# Patient Record
Sex: FEMALE | Race: BLACK OR AFRICAN AMERICAN | NOT HISPANIC OR LATINO | Employment: OTHER | URBAN - METROPOLITAN AREA
[De-identification: names, ages, dates, MRNs, and addresses within clinical notes are randomized per-mention and may not be internally consistent; named-entity substitution may affect disease eponyms.]

---

## 2017-04-25 ENCOUNTER — HOSPITAL ENCOUNTER (EMERGENCY)
Facility: HOSPITAL | Age: 57
Discharge: HOME | End: 2017-04-25
Attending: EMERGENCY MEDICINE | Admitting: EMERGENCY MEDICINE
Payer: MEDICARE

## 2017-04-25 VITALS
SYSTOLIC BLOOD PRESSURE: 143 MMHG | BODY MASS INDEX: 25.61 KG/M2 | HEART RATE: 95 BPM | RESPIRATION RATE: 16 BRPM | OXYGEN SATURATION: 99 % | TEMPERATURE: 98 F | DIASTOLIC BLOOD PRESSURE: 85 MMHG | WEIGHT: 140 LBS

## 2017-04-25 DIAGNOSIS — J44.1 COPD EXACERBATION: ICD-10-CM

## 2017-04-25 DIAGNOSIS — R06.02 SHORTNESS OF BREATH: ICD-10-CM

## 2017-04-25 DIAGNOSIS — J06.9 ACUTE URI: Primary | ICD-10-CM

## 2017-04-25 DIAGNOSIS — R07.9 CHEST PAIN: ICD-10-CM

## 2017-04-25 DIAGNOSIS — R53.1 WEAKNESS: ICD-10-CM

## 2017-04-25 LAB
ALBUMIN SERPL BCP-MCNC: 4.2 G/DL
ALP SERPL-CCNC: 113 U/L
ALT SERPL W/O P-5'-P-CCNC: 23 U/L
ANION GAP SERPL CALC-SCNC: 10 MMOL/L
AST SERPL-CCNC: 19 U/L
BASOPHILS # BLD AUTO: 0.02 K/UL
BASOPHILS NFR BLD: 0.3 %
BILIRUB SERPL-MCNC: 0.4 MG/DL
BILIRUB UR QL STRIP: NEGATIVE
BNP SERPL-MCNC: <10 PG/ML
BUN SERPL-MCNC: 17 MG/DL
CALCIUM SERPL-MCNC: 9.8 MG/DL
CHLORIDE SERPL-SCNC: 105 MMOL/L
CLARITY UR REFRACT.AUTO: CLEAR
CO2 SERPL-SCNC: 28 MMOL/L
COLOR UR AUTO: COLORLESS
CREAT SERPL-MCNC: 0.9 MG/DL
DIFFERENTIAL METHOD: ABNORMAL
EOSINOPHIL # BLD AUTO: 0.1 K/UL
EOSINOPHIL NFR BLD: 0.6 %
ERYTHROCYTE [DISTWIDTH] IN BLOOD BY AUTOMATED COUNT: 13 %
EST. GFR  (AFRICAN AMERICAN): >60 ML/MIN/1.73 M^2
EST. GFR  (NON AFRICAN AMERICAN): >60 ML/MIN/1.73 M^2
GLUCOSE SERPL-MCNC: 98 MG/DL
GLUCOSE UR QL STRIP: NEGATIVE
HCT VFR BLD AUTO: 42.2 %
HGB BLD-MCNC: 14.8 G/DL
HGB UR QL STRIP: NEGATIVE
KETONES UR QL STRIP: NEGATIVE
LEUKOCYTE ESTERASE UR QL STRIP: NEGATIVE
LYMPHOCYTES # BLD AUTO: 1.2 K/UL
LYMPHOCYTES NFR BLD: 15.2 %
MCH RBC QN AUTO: 29 PG
MCHC RBC AUTO-ENTMCNC: 35.1 %
MCV RBC AUTO: 83 FL
MONOCYTES # BLD AUTO: 0.4 K/UL
MONOCYTES NFR BLD: 4.8 %
NEUTROPHILS # BLD AUTO: 6.1 K/UL
NEUTROPHILS NFR BLD: 79 %
NITRITE UR QL STRIP: NEGATIVE
PH UR STRIP: 8 [PH] (ref 5–8)
PLATELET # BLD AUTO: 260 K/UL
PMV BLD AUTO: 9 FL
POTASSIUM SERPL-SCNC: 3.4 MMOL/L
PROT SERPL-MCNC: 8.1 G/DL
PROT UR QL STRIP: NEGATIVE
RBC # BLD AUTO: 5.1 M/UL
SODIUM SERPL-SCNC: 143 MMOL/L
SP GR UR STRIP: 1 (ref 1–1.03)
TROPONIN I SERPL DL<=0.01 NG/ML-MCNC: <0.006 NG/ML
URN SPEC COLLECT METH UR: ABNORMAL
UROBILINOGEN UR STRIP-ACNC: NEGATIVE EU/DL
WBC # BLD AUTO: 7.7 K/UL

## 2017-04-25 PROCEDURE — 25000003 PHARM REV CODE 250: Performed by: EMERGENCY MEDICINE

## 2017-04-25 PROCEDURE — 96360 HYDRATION IV INFUSION INIT: CPT

## 2017-04-25 PROCEDURE — 94640 AIRWAY INHALATION TREATMENT: CPT

## 2017-04-25 PROCEDURE — 83880 ASSAY OF NATRIURETIC PEPTIDE: CPT

## 2017-04-25 PROCEDURE — 63600175 PHARM REV CODE 636 W HCPCS: Performed by: EMERGENCY MEDICINE

## 2017-04-25 PROCEDURE — 85025 COMPLETE CBC W/AUTO DIFF WBC: CPT

## 2017-04-25 PROCEDURE — 84484 ASSAY OF TROPONIN QUANT: CPT

## 2017-04-25 PROCEDURE — 93005 ELECTROCARDIOGRAM TRACING: CPT

## 2017-04-25 PROCEDURE — 80053 COMPREHEN METABOLIC PANEL: CPT

## 2017-04-25 PROCEDURE — 81003 URINALYSIS AUTO W/O SCOPE: CPT

## 2017-04-25 PROCEDURE — 25000242 PHARM REV CODE 250 ALT 637 W/ HCPCS: Performed by: EMERGENCY MEDICINE

## 2017-04-25 PROCEDURE — 99284 EMERGENCY DEPT VISIT MOD MDM: CPT | Mod: ,,, | Performed by: EMERGENCY MEDICINE

## 2017-04-25 PROCEDURE — 99284 EMERGENCY DEPT VISIT MOD MDM: CPT | Mod: 25

## 2017-04-25 PROCEDURE — 93010 ELECTROCARDIOGRAM REPORT: CPT | Mod: ,,, | Performed by: INTERNAL MEDICINE

## 2017-04-25 RX ORDER — PREDNISONE 20 MG/1
60 TABLET ORAL
Status: COMPLETED | OUTPATIENT
Start: 2017-04-25 | End: 2017-04-25

## 2017-04-25 RX ORDER — PREDNISONE 20 MG/1
40 TABLET ORAL DAILY
Qty: 10 TABLET | Refills: 0 | Status: SHIPPED | OUTPATIENT
Start: 2017-04-25 | End: 2017-04-30

## 2017-04-25 RX ORDER — TOLTERODINE 4 MG/1
4 CAPSULE, EXTENDED RELEASE ORAL DAILY
COMMUNITY
End: 2018-01-31 | Stop reason: ALTCHOICE

## 2017-04-25 RX ORDER — IPRATROPIUM BROMIDE AND ALBUTEROL SULFATE 2.5; .5 MG/3ML; MG/3ML
3 SOLUTION RESPIRATORY (INHALATION)
Status: COMPLETED | OUTPATIENT
Start: 2017-04-25 | End: 2017-04-25

## 2017-04-25 RX ADMIN — PREDNISONE 60 MG: 20 TABLET ORAL at 01:04

## 2017-04-25 RX ADMIN — IPRATROPIUM BROMIDE AND ALBUTEROL SULFATE 3 ML: .5; 3 SOLUTION RESPIRATORY (INHALATION) at 01:04

## 2017-04-25 RX ADMIN — SODIUM CHLORIDE 1000 ML: 0.9 INJECTION, SOLUTION INTRAVENOUS at 01:04

## 2017-04-25 NOTE — DISCHARGE INSTRUCTIONS
COPD Flare    You have had a flare-up of your COPD.  COPD, or chronic obstructive pulmonary disease, is a common lung disease. It causes your airways to become irritated and narrower. This makes it harder for you to breathe. Emphysema and chronic bronchitis are both types of COPD. This is a chronic condition, which means you always have it. Sometimes it gets worse. When this happens, it is called a flare-up.  Symptoms of COPD  People with COPD may have symptoms most of the time. In a flare-up, your symptoms get worse. These symptoms may mean you are having a flare-up:  · Shortness of breath, shallow or rapid breathing, or wheezing that gets worse  · Lung infection  · Cough that gets worse  · More mucus, thicker mucus or mucus of a different color  · Tiredness, decreased energy, or trouble doing your usual activities  · Fever  · Chest tightness  · Your symptoms dont get better even when you use your usual medicines, inhalers, and nebulizer  · Trouble talking  · You feel confused  Causes of flare-ups  Unfortunately, a flare-up can happen even though you did everything right, and you followed your doctors instructions. Some causes of flare-ups are:  · Smoking or secondhand smoke  · Colds, the flu, or respiratory infections  · Air pollution  · Sudden change in the weather  · Dust, irritating chemicals, or strong fumes  · Not taking your medicines as prescribed  Home care  Here are some things you can do at home to treat a flare-up:  · Try not to panic. This makes it harder to breathe, and keeps you from doing the right things.  · Dont smoke or be around others who are smoking.  · Try to drink more fluids than usual during a flare-up, unless your doctor has told you not to because of heart and kidney problems. More fluids can help loosen the mucus.  · Use your inhalers and nebulizer, if you have one, as you have been told to.  · If you were given antibiotics, take them until they are used up or your doctor tells  you to stop. Its important to finish the antibiotics, even though you feel better. This will make sure the infection has cleared.  · If you were given prednisone or another steroid, finish it even if you feel better.  Preventing a flare-up  Even though flare-ups happen, the best way to treat one is to prevent it before it starts. Here are some pointers:  · Dont smoke or be around others who are smoking.  · Take your medicines as you have been told.  · Talk with your doctor about getting a flu shot every year. Also find out if you need a pneumonia shot.  · If there is a weather advisory warning to stay indoors, try to stay inside when possible.  · Try to eat healthy and get plenty of sleep.  · Try to avoid things that usually set you off, like dust, chemical fumes, hairsprays, or strong perfumes.  Follow-up care  Follow up with your healthcare provider, or as advised.  If a culture was done, you will be told if your treatment needs to be changed. You can call as directed for the results.  If X-rays were done, you will be notified of any new findings that may affect your care.  Call 911  Call 911 if any of these occur:  · You have trouble breathing  · You feel confused or its difficult to wake you up  · You faint or lose consciousness  · You have a rapid heart rate  · You have new pain in your chest, arm, shoulder, neck or upper back  When to seek medical advice  Call your healthcare provider right away if any of these occur:  · Wheezing or shortness of breath gets worse  · You need to use your inhalers more often than usual without relief  · Fever of 100.4°F (38ºC) or higher, or as directed by your healthcare provider  · Coughing up lots of dark-colored or bloody mucus (sputum)  · Chest pain with each breath  · You do not start to get better within 24 hours  · Swelling of your ankles gets worse  · Dizziness or weakness  Date Last Reviewed: 9/1/2016  © 2310-6605 [employer]. [address] [facility],  [place] [address]. All rights reserved. This information is not intended as a substitute for professional medical care. Always follow your healthcare professional's instructions.          Viral Upper Respiratory Illness (Adult)  You have a viral upper respiratory illness (URI), which is another term for the common cold. This illness is contagious during the first few days. It is spread through the air by coughing and sneezing. It may also be spread by direct contact (touching the sick person and then touching your own eyes, nose, or mouth). Frequent handwashing will decrease risk of spread. Most viral illnesses go away within 7 to 10 days with rest and simple home remedies. Sometimes the illness may last for several weeks. Antibiotics will not kill a virus, and they are generally not prescribed for this condition.    Home care  · If symptoms are severe, rest at home for the first 2 to 3 days. When you resume activity, don't let yourself get too tired.  · Avoid being exposed to cigarette smoke (yours or others).  · You may use acetaminophen or ibuprofen to control pain and fever, unless another medicine was prescribed. (Note: If you have chronic liver or kidney disease, have ever had a stomach ulcer or gastrointestinal bleeding, or are taking blood-thinning medicines, talk with your healthcare provider before using these medicines.) Aspirin should never be given to anyone under 18 years of age who is ill with a viral infection or fever. It may cause severe liver or brain damage.  · Your appetite may be poor, so a light diet is fine. Avoid dehydration by drinking 6 to 8 glasses of fluids per day (water, soft drinks, juices, tea, or soup). Extra fluids will help loosen secretions in the nose and lungs.  · Over-the-counter cold medicines will not shorten the length of time youre sick, but they may be helpful for the following symptoms: cough, sore throat, and nasal and sinus congestion. (Note: Do not use decongestants  if you have high blood pressure.)  Follow-up care  Follow up with your healthcare provider, or as advised.  When to seek medical advice  Call your healthcare provider right away if any of these occur:  · Cough with lots of colored sputum (mucus)  · Severe headache; face, neck, or ear pain  · Difficulty swallowing due to throat pain  · Fever of 100.4°F (38°C)  Call 911, or get immediate medical care  Call emergency services right away if any of these occur:  · Chest pain, shortness of breath, wheezing, or difficulty breathing  · Coughing up blood  · Inability to swallow due to throat pain  Date Last Reviewed: 9/13/2015 © 2000-2016 [employer]. [address] [place] [address], [place] [address]. All rights reserved. This information is not intended as a substitute for professional medical care. Always follow your healthcare professional's instructions.        Noncardiac Chest Pain    Based on your visit today, the health care provider doesnt know what is causing your chest pain. In most cases, people who come to the emergency department with chest pain dont have a problem with their heart. Instead, the pain is caused by other conditions. These may be problems with the lungs, muscles, bones, digestive tract, nerves, or mental health.  Lung problems  · Inflammation around the lungs (pleurisy)  · Collapsed lung (pneumothorax)  · Fluid around the lungs (pleural effusion)  · Lung cancer. This is a rare cause of chest pain.  Muscle or bone problems  · Inflamed cartilage between the ribs (pleurisy)  · Fibromyalgia  · Rheumatoid arthritis  Digestive system problems  · Reflux  · Stomach ulcer  · Spasms of the esophagus  · Gall stones  · Gallbladder inflammation  Mental health conditions  · Panic or anxiety attacks  · Emotional distress  Your condition doesnt seem serious and your pain doesnt appear to be coming from your heart. But sometimes the signs of a serious problem take more time to appear. Watch for the  warning signs listed below.  Home care  Follow these guidelines when caring for yourself at home:  · Rest today and avoid strenuous activity.  · Take any prescribed medicine as directed.  Follow-up care  Follow up with your health care provider, or as advised, if you dont start to feel better within 24 hours.  When to seek medical advice  Call your health care provider right away if any of these occur:  · A change in the type of pain. Call if it feels different, becomes more serious, lasts longer, or begins to spread into your shoulder, arm, neck, jaw, or back.  · Shortness of breath  · You feel more pain when you breathe  · Cough with dark-colored mucus or blood  · Weakness, dizziness, or fainting  · Fever of 100.4ºF (38ºC) or higher, or as directed by your health care provider  · Swelling, pain, or redness in one leg  Date Last Reviewed: 11/24/2014  © 7044-0341 [employer]. [address] [place] [address], [place] [address]. All rights reserved. This information is not intended as a substitute for professional medical care. Always follow your healthcare professional's instructions.

## 2017-04-25 NOTE — ED NOTES
GENERAL: The patient is a well-developed, well-nourished female in no apparent distress; alert and oriented x3.    HEENT: Head is normocephalic and atraumatic. Extraocular muscles are intact. Pupils are equal, round, and reactive to light and accommodation. Nares appeared normal. Mouth is well hydrated and without lesions. Mucous membranes are moist. Posterior pharynx clear of any exudate or lesions.    NECK: Supple. No carotid bruits. No lymphadenopathy or thyromegaly.    LUNGS: Clear to auscultation.    HEART: Regular rate and rhythm without murmur.     ABDOMEN: Soft, nontender, and nondistended. Positive bowel sounds. No hepatosplenomegaly was noted.     EXTREMITIES: Without any cyanosis, clubbing, rash, lesions or edema.     NEUROLOGIC: Cranial nerves II through XII are grossly intact.     PSYCHIATRIC: Denies suicidal or homicidal ideations.    SKIN: No ulceration or induration present.

## 2017-04-25 NOTE — PROVIDER PROGRESS NOTES - EMERGENCY DEPT.
Encounter Date: 4/25/2017    ED Physician Progress Notes       SCRIBE NOTE: I, Fabricio Mayorga, am scribing for, and in the presence of,  Dilcia Cancino MD.  Physician Statement: IDilcia MD, personally performed the services described in this documentation as scribed by Fabricio Mayorga in my presence, and it is both accurate and complete.      EKG - STEMI Decision  Initial Reading: No STEMI present.

## 2017-04-25 NOTE — ED PROVIDER NOTES
"Encounter Date: 4/25/2017    SCRIBE #1 NOTE: I, Whit Kirby, am scribing for, and in the presence of,  Dr. Mcclellan. I have scribed the entire note.       History     Chief Complaint   Patient presents with    Shortness of Breath     denies chest apin; states "I feel like I have pneumonia"     Review of patient's allergies indicates:  No Known Allergies  HPI Comments: Time seen by provider: 1:06 PM    This is a 56 y.o. female with PM hx of anxiety, COPD, and HTN who presents with complaint of chest discomfort for the past 6 days. No current CP. Pt also endorses SOB, cough productive of clear sputum, and generalized weakness for the same duration. She took Z-pack with minimal improvement of sx. She denies fever, chills, or any other symptoms at this time.     Pt reports severe itching on shin of RLE. No bruising to that area. She also recently swallowed a gold tooth.     The history is provided by the patient and medical records.     Past Medical History:   Diagnosis Date    Anxiety     Arthritis     COPD (chronic obstructive pulmonary disease)     Depression     GERD (gastroesophageal reflux disease)     Hypertension      History reviewed. No pertinent surgical history.  Family History   Problem Relation Age of Onset    Cancer Mother     Cancer Brother     Diabetes Maternal Uncle      Social History   Substance Use Topics    Smoking status: Former Smoker     Quit date: 12/29/1995    Smokeless tobacco: None    Alcohol use 1.2 oz/week     2 Cans of beer per week      Comment: daily     Review of Systems   Constitutional: Negative for chills and fever.   HENT: Negative for congestion.    Eyes: Negative for visual disturbance.   Respiratory: Positive for cough (productive of clear sputum) and shortness of breath.    Cardiovascular: Positive for chest pain (for the past 8 days though none currently).   Gastrointestinal: Negative for abdominal pain, nausea and vomiting.   Genitourinary: Negative for dysuria. "   Musculoskeletal: Negative for back pain.   Skin: Negative for rash.        (+) Itching RLE, described as severe   Neurological: Positive for weakness. Negative for headaches.       Physical Exam   Initial Vitals   BP Pulse Resp Temp SpO2   04/25/17 1201 04/25/17 1201 04/25/17 1201 04/25/17 1201 04/25/17 1201   143/85 94 16 98.1 °F (36.7 °C) 100 %     Physical Exam    Nursing note and vitals reviewed.  Constitutional: She appears well-developed and well-nourished. She is not diaphoretic. No distress.   HENT:   Head: Normocephalic and atraumatic.   Eyes: EOM are normal. Pupils are equal, round, and reactive to light.   Neck: Normal range of motion. Neck supple.   Cardiovascular: Normal rate and regular rhythm. Exam reveals no gallop and no friction rub.    No murmur heard.  Pulmonary/Chest: No respiratory distress. She has no wheezes. She has no rhonchi. She has rales.   Bibasilar crackles   Abdominal: Soft. She exhibits no distension. There is no tenderness. There is no rebound and no guarding.   Musculoskeletal: Normal range of motion. She exhibits no edema (No BLE edema) or tenderness.   Neurological: She is alert and oriented to person, place, and time. She has normal strength. No cranial nerve deficit or sensory deficit.   Skin: Skin is warm and dry. No rash noted. No erythema.   Psychiatric: She has a normal mood and affect. Her behavior is normal. Judgment and thought content normal.         ED Course   Procedures  Labs Reviewed   CBC W/ AUTO DIFFERENTIAL - Abnormal; Notable for the following:        Result Value    MPV 9.0 (*)     Gran% 79.0 (*)     Lymph% 15.2 (*)     All other components within normal limits   COMPREHENSIVE METABOLIC PANEL - Abnormal; Notable for the following:     Potassium 3.4 (*)     All other components within normal limits   URINALYSIS - Abnormal; Notable for the following:     Color, UA Colorless (*)     All other components within normal limits   TROPONIN I   B-TYPE NATRIURETIC  PEPTIDE     EKG Readings: (Independently Interpreted)   NSR. Possible Q waves anteriorly. No ST elevation or depression. No change from prior.        X-Rays:   Independently Interpreted Readings:   Chest X-Ray: No acute process.      Medical Decision Making:   History:   Old Medical Records: I decided to obtain old medical records.  Initial Assessment:   Pt with cold-like symptoms for the last 6 days but also having slight CP and SOB which does not sound cardiac in nature. Will perform cardiac work up and if negative will likely discharge home.   Differential Diagnosis:   PNA, bronchitis, COPD exacerbation, and ACS.  Independently Interpreted Test(s):   I have ordered and independently interpreted X-rays - see prior notes.  I have ordered and independently interpreted EKG Reading(s) - see prior notes  Clinical Tests:   Lab Tests: Ordered and Reviewed  Radiological Study: Ordered  Medical Tests: Ordered  ED Management:  2:36 PM  Pt's work up is completely unremarkable, including cardiac enzymes. Unclear etiology for generalized weakness and mild SOB except for COPD exacerbation and viral syndrome. Will discharge home with short course of steroids and close follow up.             Scribe Attestation:   Scribe #1: I performed the above scribed service and the documentation accurately describes the services I performed. I attest to the accuracy of the note.    Attending Attestation:           Physician Attestation for Scribe:  Physician Attestation Statement for Scribe #1: I, Dr. Mcclellan, reviewed documentation, as scribed by Whit Kirby in my presence, and it is both accurate and complete.                 ED Course     Clinical Impression:   The primary encounter diagnosis was Acute URI. Diagnoses of Shortness of breath, Chest pain, Weakness, and COPD exacerbation were also pertinent to this visit.    Disposition:   Disposition: Discharged  Condition: Stable       Ezio Mcclellan III, MD  04/25/17 4719

## 2017-04-25 NOTE — ED AVS SNAPSHOT
[facility]  [address]  [facility] [address]               Wen BAÑUELOS Vishnu   2017 12:52 PM   ED    Description:  Female : 1960   Department:  [facility]           Your Care was Coordinated By:     Provider Role From To    Ezio Mcclellan III, MD Attending Provider 17 1304 --      Reason for Visit     Shortness of Breath           Diagnoses this Visit        Comments    Acute URI    -  Primary     Shortness of breath         Chest pain         Weakness         COPD exacerbation           ED Disposition     None           To Do List           Follow-up Information     Follow up with MERRITT Solo. Schedule an appointment as soon as possible for a visit in 3 days.    Specialty:  Family Medicine    Contact information:    [address] [place] [address]  [phone]         These Medications        Disp Refills Start End    predniSONE (DELTASONE) 20 MG tablet 10 tablet 0 2017    Take 2 tablets (40 mg total) by mouth once daily. - Oral      [facility]     [facility] Care Line -  Assistance  Unless otherwise directed by your provider, please contact [facility], our nurse care line that is available for  assistance.     Registered nurses in the [facility] provide: appointment scheduling, clinical advisement, health education, and other advisory services.  Call: [phone] (toll free)               Medications           Message regarding Medications     Verify the changes and/or additions to your medication regime listed below are the same as discussed with your clinician today.  If any of these changes or additions are incorrect, please notify your healthcare provider.        START taking these NEW medications        Refills    predniSONE (DELTASONE) 20 MG tablet 0    Sig: Take 2 tablets (40 mg total) by mouth once daily.    Class: Print    Route: Oral       These medications were administered today        Dose Freq    sodium chloride 0.9% bolus 1,000 mL 1,000 mL ED 1 Time    Sig: Inject 1,000 mLs into the vein ED 1 Time.    Class: Normal    Route: Intravenous    albuterol-ipratropium 2.5mg-0.5mg/3mL nebulizer solution 3 mL 3 mL ED 1 Time    Sig: Take 3 mLs by nebulization ED 1 Time.    Class: Normal    Route: Nebulization    predniSONE tablet 60 mg 60 mg ED 1 Time    Sig: Take 3 tablets (60 mg total) by mouth ED 1 Time.    Class: Normal    Route: Oral           Verify that the below list of medications is an accurate representation of the medications you are currently taking.  If none reported, the list may be blank. If incorrect, please contact your healthcare provider. Carry this list with you in case of emergency.           Current Medications     acyclovir (ZOVIRAX) 400 MG tablet Take by mouth 2 (two) times daily.    cyproheptadine (PERIACTIN) 4 mg tablet Take 4 mg by mouth 3 (three) times daily as needed.    ferrous sulfate 325 (65 FE) MG EC tablet Take 325 mg by mouth 3 (three) times daily with meals.      fluconazole (DIFLUCAN) 150 MG Tab Take 150 mg by mouth once daily.    hydrochlorothiazide (HYDRODIURIL) 25 MG tablet Take 25 mg by mouth once daily.    omeprazole (PRILOSEC) 40 MG capsule Take 40 mg by mouth once daily.    tolterodine (DETROL LA) 4 MG 24 hr capsule Take 4 mg by mouth once daily.    aspirin (ECOTRIN) 81 MG EC tablet Take 81 mg by mouth once daily.    docusate sodium (COLACE) 100 MG capsule Take 1 capsule (100 mg total) by mouth 3 (three) times daily as needed for Constipation.    escitalopram oxalate (LEXAPRO) 10 MG tablet Take 10 mg by mouth once daily.    ferrous sulfate 325 (65 FE) MG EC tablet Take 325 mg by mouth 3 (three) times daily with meals.    lactulose (CHRONULAC) 10 gram/15 mL solution Take by mouth 3 (three) times daily.    lorazepam (ATIVAN) 1 MG tablet Take 1 mg by mouth every 6 (six) hours as needed for Anxiety.     metronidazole (FLAGYL) 500 MG tablet Take 500 mg by mouth 3 (three) times daily.    potassium chloride (KLOR-CON) 20 mEq Pack Take 20 mEq by mouth 4 (four) times daily.    predniSONE (DELTASONE) 20 MG tablet Take 2 tablets (40 mg total) by mouth once daily.    sucralfate (CARAFATE) 100 mg/mL suspension Take 10 mLs (1 g total) by mouth 4 (four) times daily. Take only for 3 days, can cause constipation           Clinical Reference Information           Your Vitals Were     BP Pulse Temp Resp Weight SpO2    143/85 (BP Location: Right arm, Patient Position: Sitting) 95 98.1 °F (36.7 °C) (Oral) 16 63.5 kg (140 lb) 99%    BMI                25.61 kg/m2          Allergies as of 4/25/2017     No Known Allergies      Immunizations Administered on Date of Encounter - 4/25/2017     None      ED Micro, Lab, POCT     Start Ordered       Status Ordering Provider    04/25/17 1351 04/25/17 1350  Urinalysis  STAT      Final result     04/25/17 1313 04/25/17 1312  CBC auto differential  STAT      Final result     04/25/17 1313 04/25/17 1312  Comprehensive metabolic panel  STAT      Final result     04/25/17 1313 04/25/17 1312  Troponin I  STAT      Final result     04/25/17 1313 04/25/17 1312  Brain natriuretic peptide  STAT      Final result     04/25/17 1313 04/25/17 1312    STAT,   Status:  Canceled      Canceled       ED Imaging Orders     Start Ordered       Status Ordering Provider    04/25/17 1313 04/25/17 1312  X-Ray Chest PA And Lateral  1 time imaging      Final result         Discharge Instructions           COPD Flare    You have had a flare-up of your COPD.  COPD, or chronic obstructive pulmonary disease, is a common lung disease. It causes your airways to become irritated and narrower. This makes it harder for you to breathe. Emphysema and chronic bronchitis are both types of COPD. This is a chronic condition, which means you always have it. Sometimes it gets worse. When this happens, it is called a flare-up.  Symptoms  of COPD  People with COPD may have symptoms most of the time. In a flare-up, your symptoms get worse. These symptoms may mean you are having a flare-up:  · Shortness of breath, shallow or rapid breathing, or wheezing that gets worse  · Lung infection  · Cough that gets worse  · More mucus, thicker mucus or mucus of a different color  · Tiredness, decreased energy, or trouble doing your usual activities  · Fever  · Chest tightness  · Your symptoms dont get better even when you use your usual medicines, inhalers, and nebulizer  · Trouble talking  · You feel confused  Causes of flare-ups  Unfortunately, a flare-up can happen even though you did everything right, and you followed your doctors instructions. Some causes of flare-ups are:  · Smoking or secondhand smoke  · Colds, the flu, or respiratory infections  · Air pollution  · Sudden change in the weather  · Dust, irritating chemicals, or strong fumes  · Not taking your medicines as prescribed  Home care  Here are some things you can do at home to treat a flare-up:  · Try not to panic. This makes it harder to breathe, and keeps you from doing the right things.  · Dont smoke or be around others who are smoking.  · Try to drink more fluids than usual during a flare-up, unless your doctor has told you not to because of heart and kidney problems. More fluids can help loosen the mucus.  · Use your inhalers and nebulizer, if you have one, as you have been told to.  · If you were given antibiotics, take them until they are used up or your doctor tells you to stop. Its important to finish the antibiotics, even though you feel better. This will make sure the infection has cleared.  · If you were given prednisone or another steroid, finish it even if you feel better.  Preventing a flare-up  Even though flare-ups happen, the best way to treat one is to prevent it before it starts. Here are some pointers:  · Dont smoke or be around others who are smoking.  · Take your  medicines as you have been told.  · Talk with your doctor about getting a flu shot every year. Also find out if you need a pneumonia shot.  · If there is a weather advisory warning to stay indoors, try to stay inside when possible.  · Try to eat healthy and get plenty of sleep.  · Try to avoid things that usually set you off, like dust, chemical fumes, hairsprays, or strong perfumes.  Follow-up care  Follow up with your healthcare provider, or as advised.  If a culture was done, you will be told if your treatment needs to be changed. You can call as directed for the results.  If X-rays were done, you will be notified of any new findings that may affect your care.  Call 911  Call 911 if any of these occur:  · You have trouble breathing  · You feel confused or its difficult to wake you up  · You faint or lose consciousness  · You have a rapid heart rate  · You have new pain in your chest, arm, shoulder, neck or upper back  When to seek medical advice  Call your healthcare provider right away if any of these occur:  · Wheezing or shortness of breath gets worse  · You need to use your inhalers more often than usual without relief  · Fever of 100.4°F (38ºC) or higher, or as directed by your healthcare provider  · Coughing up lots of dark-colored or bloody mucus (sputum)  · Chest pain with each breath  · You do not start to get better within 24 hours  · Swelling of your ankles gets worse  · Dizziness or weakness  Date Last Reviewed: 9/1/2016  © 9223-4865 [employer]. [address] [place] [address], [place] [address]. All rights reserved. This information is not intended as a substitute for professional medical care. Always follow your healthcare professional's instructions.          Viral Upper Respiratory Illness (Adult)  You have a viral upper respiratory illness (URI), which is another term for the common cold. This illness is contagious during the first few days. It is spread through the air by coughing and  sneezing. It may also be spread by direct contact (touching the sick person and then touching your own eyes, nose, or mouth). Frequent handwashing will decrease risk of spread. Most viral illnesses go away within 7 to 10 days with rest and simple home remedies. Sometimes the illness may last for several weeks. Antibiotics will not kill a virus, and they are generally not prescribed for this condition.    Home care  · If symptoms are severe, rest at home for the first 2 to 3 days. When you resume activity, don't let yourself get too tired.  · Avoid being exposed to cigarette smoke (yours or others).  · You may use acetaminophen or ibuprofen to control pain and fever, unless another medicine was prescribed. (Note: If you have chronic liver or kidney disease, have ever had a stomach ulcer or gastrointestinal bleeding, or are taking blood-thinning medicines, talk with your healthcare provider before using these medicines.) Aspirin should never be given to anyone under 18 years of age who is ill with a viral infection or fever. It may cause severe liver or brain damage.  · Your appetite may be poor, so a light diet is fine. Avoid dehydration by drinking 6 to 8 glasses of fluids per day (water, soft drinks, juices, tea, or soup). Extra fluids will help loosen secretions in the nose and lungs.  · Over-the-counter cold medicines will not shorten the length of time youre sick, but they may be helpful for the following symptoms: cough, sore throat, and nasal and sinus congestion. (Note: Do not use decongestants if you have high blood pressure.)  Follow-up care  Follow up with your healthcare provider, or as advised.  When to seek medical advice  Call your healthcare provider right away if any of these occur:  · Cough with lots of colored sputum (mucus)  · Severe headache; face, neck, or ear pain  · Difficulty swallowing due to throat pain  · Fever of 100.4°F (38°C)  Call 911, or get immediate medical care  Call emergency  services right away if any of these occur:  · Chest pain, shortness of breath, wheezing, or difficulty breathing  · Coughing up blood  · Inability to swallow due to throat pain  Date Last Reviewed: 9/13/2015 © 2000-2016 [employer]. [address] [place] [address], [place] [address]. All rights reserved. This information is not intended as a substitute for professional medical care. Always follow your healthcare professional's instructions.        Noncardiac Chest Pain    Based on your visit today, the health care provider doesnt know what is causing your chest pain. In most cases, people who come to the emergency department with chest pain dont have a problem with their heart. Instead, the pain is caused by other conditions. These may be problems with the lungs, muscles, bones, digestive tract, nerves, or mental health.  Lung problems  · Inflammation around the lungs (pleurisy)  · Collapsed lung (pneumothorax)  · Fluid around the lungs (pleural effusion)  · Lung cancer. This is a rare cause of chest pain.  Muscle or bone problems  · Inflamed cartilage between the ribs (pleurisy)  · Fibromyalgia  · Rheumatoid arthritis  Digestive system problems  · Reflux  · Stomach ulcer  · Spasms of the esophagus  · Gall stones  · Gallbladder inflammation  Mental health conditions  · Panic or anxiety attacks  · Emotional distress  Your condition doesnt seem serious and your pain doesnt appear to be coming from your heart. But sometimes the signs of a serious problem take more time to appear. Watch for the warning signs listed below.  Home care  Follow these guidelines when caring for yourself at home:  · Rest today and avoid strenuous activity.  · Take any prescribed medicine as directed.  Follow-up care  Follow up with your health care provider, or as advised, if you dont start to feel better within 24 hours.  When to seek medical advice  Call your health care provider right away if any of these occur:  · A change in  the type of pain. Call if it feels different, becomes more serious, lasts longer, or begins to spread into your shoulder, arm, neck, jaw, or back.  · Shortness of breath  · You feel more pain when you breathe  · Cough with dark-colored mucus or blood  · Weakness, dizziness, or fainting  · Fever of 100.4ºF (38ºC) or higher, or as directed by your health care provider  · Swelling, pain, or redness in one leg  Date Last Reviewed: 11/24/2014  © 1119-3850 [employer]. [address] [place] [address]. All rights reserved. This information is not intended as a substitute for professional medical care. Always follow your healthcare professional's instructions.          [facility] Sign-Up     Activating your [facility] account is as easy as 1-2-3!     1) Visit [email or web address], select Sign Up Now, enter this activation code and your date of birth, then select Next.  [email or web address]  Expires: 6/9/2017  2:37 PM      2) Create a username and password to use when you visit [facility] in the future and select a security question in case you lose your password and select Next.    3) Enter your e-mail address and click Sign Up!    Additional Information  If you have questions, please e-mail [email or web address] or call [phone] to talk to our [facility] staff. Remember, [facility] is NOT to be used for urgent needs. For medical emergencies, dial 911.         Smoking Cessation     If you would like to quit smoking:   You may be eligible for free services if you are a [place] resident and started smoking cigarettes before September 1, 1988.  Call the Smoking Cessation Trust (SCT) toll free at (206) 185-4397 or (441) 760-4109.   Call 1-800-QUIT-NOW if you do not meet the above criteria.   Contact us via email: [email or web address]   View our website for more information: [email or web address]         [facility] complies with applicable Federal civil rights laws and does not  discriminate on the basis of race, color, national origin, age, disability, or sex.        Language Assistance Services     ATTENTION: Language assistance services are available, free of charge. Please call [phone].      ATENCIÓN: Si christiano doan, tiene a walsh disposición servicios gratuitos de asistencia lingüística. Llame al [phone].     CHÚ Ý: N?u b?n nói Ti?ng Vi?t, có các d?ch v? h? tr? ngôn ng? mi?n phí dành cho b?n. G?i s? [phone].

## 2018-01-30 ENCOUNTER — OFFICE VISIT (OUTPATIENT)
Dept: UROLOGY | Facility: CLINIC | Age: 58
End: 2018-01-30
Payer: MEDICARE

## 2018-01-30 VITALS
DIASTOLIC BLOOD PRESSURE: 86 MMHG | SYSTOLIC BLOOD PRESSURE: 114 MMHG | HEART RATE: 86 BPM | WEIGHT: 145.5 LBS | BODY MASS INDEX: 26.61 KG/M2

## 2018-01-30 DIAGNOSIS — R35.1 NOCTURIA: ICD-10-CM

## 2018-01-30 DIAGNOSIS — N81.10 FEMALE CYSTOCELE: ICD-10-CM

## 2018-01-30 DIAGNOSIS — R33.9 INCOMPLETE BLADDER EMPTYING: ICD-10-CM

## 2018-01-30 DIAGNOSIS — R35.0 FREQUENCY OF URINATION: Primary | ICD-10-CM

## 2018-01-30 PROCEDURE — 99203 OFFICE O/P NEW LOW 30 MIN: CPT | Mod: 25,S$PBB,, | Performed by: PHYSICIAN ASSISTANT

## 2018-01-30 PROCEDURE — 99214 OFFICE O/P EST MOD 30 MIN: CPT | Mod: PBBFAC | Performed by: PHYSICIAN ASSISTANT

## 2018-01-30 PROCEDURE — 51701 INSERT BLADDER CATHETER: CPT | Mod: S$PBB,,, | Performed by: PHYSICIAN ASSISTANT

## 2018-01-30 PROCEDURE — 51701 INSERT BLADDER CATHETER: CPT | Mod: PBBFAC | Performed by: PHYSICIAN ASSISTANT

## 2018-01-30 PROCEDURE — 99999 PR PBB SHADOW E&M-EST. PATIENT-LVL IV: CPT | Mod: PBBFAC,,, | Performed by: PHYSICIAN ASSISTANT

## 2018-01-30 NOTE — LETTER
January 30, 2018      MERRITT Solo  [address]  [place] [address]           [facility] - Urology 4th Floor  [address]  [facility] [address]  Phone: [phone]          Patient: Wen Mares   MR Number: [id number]   YOB: 1960   Date of Visit: 1/30/2018       Dear Cristine Austin:    Thank you for referring Wen Mares to me for evaluation. Attached you will find relevant portions of my assessment and plan of care.    If you have questions, please do not hesitate to call me. I look forward to following Wen Mares along with you.    Sincerely,    Tootie Peters PA-C    Enclosure  CC:  No Recipients    If you would like to receive this communication electronically, please contact [email or web address] or [phone] to request more information on [employer] access.    For providers and/or their staff who would like to refer a patient to [facility], please contact us through our one-stop-shop provider referral line, [facility], at [phone].    If you feel you have received this communication in error or would no longer like to receive these types of communications, please e-mail [email or web address]

## 2018-01-30 NOTE — PROGRESS NOTES
CHIEF COMPLAINT:    Mrs. Mares is a 57 y.o. female presenting for urinary frequency.  PRESENTING ILLNESS:    Wen Mares is a 57 y.o. female who presents for urinary frequency since 2001.  Her frequency is worse when she is nervous (q 30 min).  She also reports nocturia.  She has urinary incontinence and wears a panty liner.  She changes it about twice a day.  She takes detrol for over a year.  It is not working.  She denies hematuria, dysuria, and recurrent UTI.    Her constipation is not managed by probiotics.    She takes HZTC.    REVIEW OF SYSTEMS:  Constitutional: Negative for fever and chills.   HENT: Negative for hearing loss.   Eyes: Negative for visual disturbance.   Respiratory: Positive for shortness of breath. Has COPD (uses inhaler)  Cardiovascular: Negative for chest pain.   Gastrointestinal: Positive for constipation Negative for nausea, vomiting.   Genitourinary:  See HPI  Neurological: Negative for dizziness.   Hematological: Does not bruise/bleed easily.   Psychiatric/Behavioral: Negative for confusion.     PATIENT HISTORY:    Past Medical History:   Diagnosis Date    Anxiety     Arthritis     COPD (chronic obstructive pulmonary disease)     Depression     GERD (gastroesophageal reflux disease)     Hypertension        History reviewed. No pertinent surgical history.    Family History   Problem Relation Age of Onset    Cancer Mother     Cancer Brother     Diabetes Maternal Uncle        Social History     Social History    Marital status: Single     Spouse name: N/A    Number of children: N/A    Years of education: N/A     Occupational History    Not on file.     Social History Main Topics    Smoking status: Former Smoker     Quit date: 12/29/1995    Smokeless tobacco: Not on file    Alcohol use 1.2 oz/week     2 Cans of beer per week      Comment: daily    Drug use: No    Sexual activity: Not on file     Other Topics Concern    Not on file     Social History Narrative     No narrative on file       Allergies:  Patient has no known allergies.    Medications:    Current Outpatient Prescriptions:     acyclovir (ZOVIRAX) 400 MG tablet, Take by mouth 2 (two) times daily., Disp: , Rfl:     aspirin (ECOTRIN) 81 MG EC tablet, Take 81 mg by mouth once daily., Disp: , Rfl:     cyproheptadine (PERIACTIN) 4 mg tablet, Take 4 mg by mouth 3 (three) times daily as needed., Disp: , Rfl:     docusate sodium (COLACE) 100 MG capsule, Take 1 capsule (100 mg total) by mouth 3 (three) times daily as needed for Constipation., Disp: 60 capsule, Rfl: 0    escitalopram oxalate (LEXAPRO) 10 MG tablet, Take 10 mg by mouth once daily., Disp: , Rfl:     ferrous sulfate 325 (65 FE) MG EC tablet, Take 325 mg by mouth 3 (three) times daily with meals.  , Disp: , Rfl:     ferrous sulfate 325 (65 FE) MG EC tablet, Take 325 mg by mouth 3 (three) times daily with meals., Disp: , Rfl:     fluconazole (DIFLUCAN) 150 MG Tab, Take 150 mg by mouth once daily., Disp: , Rfl:     hydrochlorothiazide (HYDRODIURIL) 25 MG tablet, Take 25 mg by mouth once daily., Disp: , Rfl:     lactulose (CHRONULAC) 10 gram/15 mL solution, Take by mouth 3 (three) times daily., Disp: , Rfl:     lorazepam (ATIVAN) 1 MG tablet, Take 1 mg by mouth every 6 (six) hours as needed for Anxiety., Disp: , Rfl:     metronidazole (FLAGYL) 500 MG tablet, Take 500 mg by mouth 3 (three) times daily., Disp: , Rfl:     omeprazole (PRILOSEC) 40 MG capsule, Take 40 mg by mouth once daily., Disp: , Rfl:     potassium chloride (KLOR-CON) 20 mEq Pack, Take 20 mEq by mouth 4 (four) times daily., Disp: , Rfl:     sucralfate (CARAFATE) 100 mg/mL suspension, Take 10 mLs (1 g total) by mouth 4 (four) times daily. Take only for 3 days, can cause constipation, Disp: 414 mL, Rfl: 0    tolterodine (DETROL LA) 4 MG 24 hr capsule, Take 4 mg by mouth once daily., Disp: , Rfl:     PHYSICAL EXAMINATION:    Constitutional: She appears well-developed and  well-nourished.  She is in no apparent distress.    Eyes: No scleral icterus noted bilaterally.  No discharge noted bilaterally.      Cardiovascular: Normal rate.  No pitting edema noted in lower extremities bilaterally    Pulmonary/Chest: Effort normal. No respiratory distress.     Abdominal:  She exhibits no distension.  There is no CVA tenderness.     Lymphadenopathy:          Right: No supraclavicular adenopathy present.        Left: No supraclavicular adenopathy present.     Neurological: She is alert and oriented to person, place, and time.     Skin: Skin is warm and dry.     Psych: Cooperative with normal affect.    Genitourinary:Normal external female genitalia  Urethral meatus is normal  Urethra and bladder nontender.   Cystocele noted.   No adnexal masses    Physical Exam    Consent verbally obtained.  Betadine prep was applied to the urethral meatus. An in and out cath was performed.The PVR was 200 ml.  Patient stated afterwards that she felt like she needed to void prior to exam.    LABS:    U/a: 1.010, pH 5, negative    IMPRESSION:    Encounter Diagnoses   Name Primary?    Frequency of urination Yes    Nocturia     Incomplete bladder emptying        PLAN:  Discussed cystocele and treatment options including surgery, pessary placement.  She is not interested in surgery.  She would considered pessary placement.   Discussed other OAB medications.  Discussed today's PVR.  Patient will return to clinic tomorrow to recheck PvR as she did not feel like she was empty prior to checking PVR.  If within normal range then we will try oxybutynin.    Tootie Peters PA-C

## 2018-01-31 ENCOUNTER — CLINICAL SUPPORT (OUTPATIENT)
Dept: UROLOGY | Facility: CLINIC | Age: 58
End: 2018-01-31
Payer: MEDICARE

## 2018-01-31 DIAGNOSIS — N32.81 OAB (OVERACTIVE BLADDER): Primary | ICD-10-CM

## 2018-01-31 DIAGNOSIS — R35.0 FREQUENCY OF URINATION: ICD-10-CM

## 2018-01-31 DIAGNOSIS — R35.1 NOCTURIA: ICD-10-CM

## 2018-01-31 RX ORDER — OXYBUTYNIN CHLORIDE 10 MG/1
10 TABLET, EXTENDED RELEASE ORAL DAILY
Qty: 30 TABLET | Refills: 11 | Status: SHIPPED | OUTPATIENT
Start: 2018-01-31 | End: 2018-06-12

## 2018-01-31 NOTE — PROGRESS NOTES
Patient's PVR today immediately after voiding was 3cc. Patient instructed to stop detrol and start oxybutynin.  She will rtc in 4-6 weeks to reassess symptoms.

## 2018-01-31 NOTE — PROGRESS NOTES
Patient presented to clinic for post void residual per Tootie Allen PA-C. Pt urinated in office and a bladder scan was performed that yielded 3 cc. Provider notified. Pt will be started on medication.

## 2018-04-03 ENCOUNTER — TELEPHONE (OUTPATIENT)
Dept: GASTROENTEROLOGY | Facility: CLINIC | Age: 58
End: 2018-04-03

## 2018-04-11 ENCOUNTER — LAB VISIT (OUTPATIENT)
Dept: LAB | Facility: HOSPITAL | Age: 58
End: 2018-04-11
Payer: MEDICARE

## 2018-04-11 ENCOUNTER — TELEPHONE (OUTPATIENT)
Dept: ENDOSCOPY | Facility: HOSPITAL | Age: 58
End: 2018-04-11

## 2018-04-11 ENCOUNTER — OFFICE VISIT (OUTPATIENT)
Dept: GASTROENTEROLOGY | Facility: CLINIC | Age: 58
End: 2018-04-11
Payer: MEDICARE

## 2018-04-11 VITALS
SYSTOLIC BLOOD PRESSURE: 117 MMHG | DIASTOLIC BLOOD PRESSURE: 81 MMHG | HEART RATE: 86 BPM | HEIGHT: 62 IN | WEIGHT: 149.25 LBS | BODY MASS INDEX: 27.47 KG/M2

## 2018-04-11 DIAGNOSIS — R14.0 ABDOMINAL BLOATING: ICD-10-CM

## 2018-04-11 DIAGNOSIS — R10.12 LUQ PAIN: Primary | ICD-10-CM

## 2018-04-11 DIAGNOSIS — K92.1 MELENA: ICD-10-CM

## 2018-04-11 DIAGNOSIS — K64.9 HEMORRHOIDS, UNSPECIFIED HEMORRHOID TYPE: ICD-10-CM

## 2018-04-11 DIAGNOSIS — R10.32 LLQ PAIN: ICD-10-CM

## 2018-04-11 DIAGNOSIS — R10.12 LUQ PAIN: ICD-10-CM

## 2018-04-11 LAB
ALBUMIN SERPL BCP-MCNC: 4.3 G/DL
ALP SERPL-CCNC: 104 U/L
ALT SERPL W/O P-5'-P-CCNC: 17 U/L
ANION GAP SERPL CALC-SCNC: 10 MMOL/L
AST SERPL-CCNC: 15 U/L
BASOPHILS # BLD AUTO: 0.05 K/UL
BASOPHILS NFR BLD: 0.8 %
BILIRUB SERPL-MCNC: 0.5 MG/DL
BUN SERPL-MCNC: 12 MG/DL
CALCIUM SERPL-MCNC: 9.6 MG/DL
CHLORIDE SERPL-SCNC: 103 MMOL/L
CO2 SERPL-SCNC: 29 MMOL/L
CREAT SERPL-MCNC: 0.8 MG/DL
DIFFERENTIAL METHOD: ABNORMAL
EOSINOPHIL # BLD AUTO: 0.1 K/UL
EOSINOPHIL NFR BLD: 1.1 %
ERYTHROCYTE [DISTWIDTH] IN BLOOD BY AUTOMATED COUNT: 12.9 %
EST. GFR  (AFRICAN AMERICAN): >60 ML/MIN/1.73 M^2
EST. GFR  (NON AFRICAN AMERICAN): >60 ML/MIN/1.73 M^2
GLUCOSE SERPL-MCNC: 103 MG/DL
HCT VFR BLD AUTO: 41.7 %
HGB BLD-MCNC: 13.9 G/DL
IGA SERPL-MCNC: 227 MG/DL
IMM GRANULOCYTES # BLD AUTO: 0.02 K/UL
IMM GRANULOCYTES NFR BLD AUTO: 0.3 %
LIPASE SERPL-CCNC: 19 U/L
LYMPHOCYTES # BLD AUTO: 2.3 K/UL
LYMPHOCYTES NFR BLD: 36 %
MCH RBC QN AUTO: 29 PG
MCHC RBC AUTO-ENTMCNC: 33.3 G/DL
MCV RBC AUTO: 87 FL
MONOCYTES # BLD AUTO: 0.4 K/UL
MONOCYTES NFR BLD: 5.7 %
NEUTROPHILS # BLD AUTO: 3.6 K/UL
NEUTROPHILS NFR BLD: 56.1 %
NRBC BLD-RTO: 0 /100 WBC
PLATELET # BLD AUTO: 324 K/UL
PMV BLD AUTO: 9.1 FL
POTASSIUM SERPL-SCNC: 3.2 MMOL/L
PROT SERPL-MCNC: 8 G/DL
RBC # BLD AUTO: 4.79 M/UL
SODIUM SERPL-SCNC: 142 MMOL/L
WBC # BLD AUTO: 6.47 K/UL

## 2018-04-11 PROCEDURE — 82784 ASSAY IGA/IGD/IGG/IGM EACH: CPT

## 2018-04-11 PROCEDURE — 36415 COLL VENOUS BLD VENIPUNCTURE: CPT

## 2018-04-11 PROCEDURE — 99999 PR PBB SHADOW E&M-EST. PATIENT-LVL V: CPT | Mod: PBBFAC,,, | Performed by: PHYSICIAN ASSISTANT

## 2018-04-11 PROCEDURE — 99215 OFFICE O/P EST HI 40 MIN: CPT | Mod: PBBFAC | Performed by: PHYSICIAN ASSISTANT

## 2018-04-11 PROCEDURE — 83516 IMMUNOASSAY NONANTIBODY: CPT

## 2018-04-11 PROCEDURE — 83690 ASSAY OF LIPASE: CPT

## 2018-04-11 PROCEDURE — 85025 COMPLETE CBC W/AUTO DIFF WBC: CPT

## 2018-04-11 PROCEDURE — 86677 HELICOBACTER PYLORI ANTIBODY: CPT

## 2018-04-11 PROCEDURE — 99214 OFFICE O/P EST MOD 30 MIN: CPT | Mod: S$PBB,ICN,, | Performed by: PHYSICIAN ASSISTANT

## 2018-04-11 PROCEDURE — 80053 COMPREHEN METABOLIC PANEL: CPT

## 2018-04-11 NOTE — PROGRESS NOTES
[facility] Consultation Note    Reason for Consult:  The primary encounter diagnosis was LUQ pain. Diagnoses of Melena, LLQ pain, Hemorrhoids, unspecified hemorrhoid type, and Abdominal bloating were also pertinent to this visit.    PCP:   Cristine Austin       Referring MD:  Aaareferral Self  No address on file    HPI:  This is a 57 y.o. female here for evaluation of abdominal pain    Location- LUQ and LLQ  Onset-few months  Palliative/Worse - increase at times with meals, increase with drinking beer  Some relief with having a BM  Quality-sharp shooting  Radiation- radiates to the back  Severity- severe day after easter  Timing-comes and goes  +bloating and gas  - taking gas x  No N/V  Colonoscopy and EGD in 2017  Last saw another GI few months ago    Black stool yesterday and dark stools on Monday  Having daily BM since she started a probiotic x 3 months  +hemorrhoidal flaring    Occasional GERD as long as she takes her omeprazole    Aleve - twice a month  Drinking etoh 3 times a week    Hannah screening- Family Hx of:  Colon cancer- no    Binge eating and not sleeping  at night    ROS:  Constitutional: No fevers, chills, No weight loss  ENT: No allergies  CV: No chest pain  Pulm: No cough, No shortness of breath  Ophtho: No vision changes  GI: see HPI  Derm: No rash  Heme: No lymphadenopathy,+ bruising  MSK: No arthritis  : No dysuria, No hematuria  Endo: No hot or cold intolerance  Neuro: No syncope, No seizure  Psych: No anxiety, No depression    Medical History:  has a past medical history of Anxiety; Arthritis; COPD (chronic obstructive pulmonary disease); Depression; GERD (gastroesophageal reflux disease); and Hypertension.    Surgical History:  has no past surgical history on file.    Family History: family history includes Cancer in her brother and mother; Diabetes in her maternal uncle..     Social History:  reports that she quit smoking about 22 years ago. She has never used  "smokeless tobacco. She reports that she drinks alcohol. She reports that she does not use drugs.    Review of patient's allergies indicates:  No Known Allergies    Current Outpatient Prescriptions on File Prior to Visit   Medication Sig Dispense Refill    acyclovir (ZOVIRAX) 400 MG tablet Take by mouth 2 (two) times daily.      aspirin (ECOTRIN) 81 MG EC tablet Take 81 mg by mouth once daily.      cyproheptadine (PERIACTIN) 4 mg tablet Take 4 mg by mouth 3 (three) times daily as needed.      docusate sodium (COLACE) 100 MG capsule Take 1 capsule (100 mg total) by mouth 3 (three) times daily as needed for Constipation. 60 capsule 0    escitalopram oxalate (LEXAPRO) 10 MG tablet Take 10 mg by mouth once daily.      ferrous sulfate 325 (65 FE) MG EC tablet Take 325 mg by mouth 3 (three) times daily with meals.        fluconazole (DIFLUCAN) 150 MG Tab Take 150 mg by mouth once daily.      hydrochlorothiazide (HYDRODIURIL) 25 MG tablet Take 25 mg by mouth once daily.      lactulose (CHRONULAC) 10 gram/15 mL solution Take by mouth 3 (three) times daily.      lorazepam (ATIVAN) 1 MG tablet Take 1 mg by mouth every 6 (six) hours as needed for Anxiety.      metronidazole (FLAGYL) 500 MG tablet Take 500 mg by mouth 3 (three) times daily.      omeprazole (PRILOSEC) 40 MG capsule Take 40 mg by mouth once daily.      oxybutynin (DITROPAN-XL) 10 MG 24 hr tablet Take 1 tablet (10 mg total) by mouth once daily. 30 tablet 11    potassium chloride (KLOR-CON) 20 mEq Pack Take 20 mEq by mouth 4 (four) times daily.      sucralfate (CARAFATE) 100 mg/mL suspension Take 10 mLs (1 g total) by mouth 4 (four) times daily. Take only for 3 days, can cause constipation 414 mL 0     No current facility-administered medications on file prior to visit.          Objective Findings:    Vital Signs:  /81   Pulse 86   Ht 5' 2" (1.575 m)   Wt 67.7 kg (149 lb 4 oz)   BMI 27.30 kg/m²   Body mass index is 27.3 kg/m².    Physical " Exam:  General Appearance: Well appearing in no acute distress  Head:   Normocephalic, without obvious abnormality  Eyes:    No scleral icterus  ENT: Neck supple, Lips, mucosa, and tongue normal  Lungs: CTA bilaterally in anterior and posterior fields, no wheezes, no crackles.  Heart:  Regular rate and rhythm, S1, S2 normal, no murmurs heard  Abdomen: Soft, epigastric and LUQ tenderness, non distended with positive bowel sounds in all four quadrants.  Extremities: 2+ pulses, no clubbing, cyanosis or edema  Skin: No rash  Neurologic: AAO x 3      Labs:  Lab Results   Component Value Date    WBC 6.47 04/11/2018    HGB 13.9 04/11/2018    HCT 41.7 04/11/2018     04/11/2018    ALT 17 04/11/2018    AST 15 04/11/2018     04/11/2018    K 3.2 (L) 04/11/2018     04/11/2018    CREATININE 0.8 04/11/2018    BUN 12 04/11/2018    CO2 29 04/11/2018       Imaging:    Endoscopy:      Assessment:  1. LUQ pain    2. Melena    3. LLQ pain    4. Hemorrhoids, unspecified hemorrhoid type    5. Abdominal bloating       56yo  F with LUQ and LLQ pain x a few months.     Also has hemorrhoids    Recommendations:  1. Schedule EGD to rule out gastric ulcers.    2. CT scan to rule out diverticulitis and pancreatitis    3. Labs to rule out elevated WBC, elevated lfts, elevated lipase, H. Pylori and celiac.    4. Referral to CRS for hemorrhoids     5. SADGAS diet    No Follow-up on file.      Order summary:  Orders Placed This Encounter    CT Abdomen Pelvis With Contrast    CBC auto differential    Comprehensive metabolic panel    Lipase    H. PYLORI ANTIBODY, IGG    TISSUE TRANSGLUTAMINASE (TTG), IGA    IgA    Ambulatory Referral to Colorectal Surgery    Case request GI: ESOPHAGOGASTRODUODENOSCOPY (EGD)         Thank you so much for allowing me to participate in the care of Wen Mcdowell PA-C

## 2018-04-11 NOTE — PATIENT INSTRUCTIONS
"    What to Eat and What Not to Eat       (to reduce bloating)    Avoid drinking from straws  Avoid eating excessively fast  Avoid eating excessively slow  Avoid gum chewing         Drinks  Cut out: Dairy, soda (regular and diet), sports drinks, fruit drinks, alcohol, caffeine, soy milk, carbonated beverages, kombucha    Drink: Water (1-2 Liters a day), coconut water, herbal tea, green juices (kale, spinach, green apple), smoothies (berries, bananas, amond milk, ice, flax seed).  Drink at the end of the meal; not during.         Food    Eliminate:  Eliminate all of these foods and ingredients for 10 days. Once the bloating has reduced, can add back one at a time to see which ones your body can tolerate.    Soy  Artificial sweeteners - sugar alcohols, splenda, aspartame  Dairy  Gluten - milk, cheese, butter, and yogurt  Alcohol  Sugar - no added sugars (glucose, fructose, maltose, dextrose, corn and maple syrup, honey, agave, stevia)    Limit:  Beans, broccoli, cabbage  Caffeine (1 small cup of coffee or tea a day)  Fatty meals  Meat  Processed foods (if it comes in a box, has more than 10 listed ingredients, has an expiration date)  Salt    Eat:  50% of daily intake should be food eaten in its natural, raw state.  Leafy greens - Kale, spinach, chard, collards, parsley, turnip and mustard greens, arugula, bok marzena, lillie lettuce  Fiber - favor plant based sources, not processed fibers (cereals, fiber in a box)  Papaya and Pineapple  Brightly colored produce - strawberries, blueberries, bell peppers, lemon, spinach      Adapted from "Gutkahlil" by Dr. Arron Smith    "

## 2018-04-12 ENCOUNTER — HOSPITAL ENCOUNTER (OUTPATIENT)
Dept: RADIOLOGY | Facility: HOSPITAL | Age: 58
Discharge: HOME | End: 2018-04-12
Attending: PHYSICIAN ASSISTANT
Payer: MEDICARE

## 2018-04-12 DIAGNOSIS — R10.12 LUQ PAIN: ICD-10-CM

## 2018-04-12 DIAGNOSIS — R10.32 LLQ PAIN: ICD-10-CM

## 2018-04-12 PROCEDURE — 74177 CT ABD & PELVIS W/CONTRAST: CPT | Mod: TC

## 2018-04-12 PROCEDURE — 74177 CT ABD & PELVIS W/CONTRAST: CPT | Mod: 26,,, | Performed by: RADIOLOGY

## 2018-04-12 PROCEDURE — 25500020 PHARM REV CODE 255: Performed by: PHYSICIAN ASSISTANT

## 2018-04-12 RX ADMIN — IOHEXOL 15 ML: 350 INJECTION, SOLUTION INTRAVENOUS at 09:04

## 2018-04-12 RX ADMIN — IOHEXOL 75 ML: 350 INJECTION, SOLUTION INTRAVENOUS at 10:04

## 2018-04-13 ENCOUNTER — TELEPHONE (OUTPATIENT)
Dept: GASTROENTEROLOGY | Facility: CLINIC | Age: 58
End: 2018-04-13

## 2018-04-13 LAB — TTG IGA SER IA-ACNC: 5 UNITS

## 2018-04-13 NOTE — TELEPHONE ENCOUNTER
----- Message from Fran Mcdowell PA-C sent at 4/13/2018  3:49 PM CDT -----  Please let pt know her recent CT did not reveal any findings to explain her abdominal pain. It did reveal an adrenal nodule that needs further imaging. She should f/u with her PCP about this. Ask her for PCP info so we can fax a copy of CT to them. She also has a cyst on her right kidney which is typically benign.

## 2018-04-15 ENCOUNTER — NURSE TRIAGE (OUTPATIENT)
Dept: ADMINISTRATIVE | Facility: CLINIC | Age: 58
End: 2018-04-15

## 2018-04-15 ENCOUNTER — TELEPHONE (OUTPATIENT)
Dept: ENDOSCOPY | Facility: HOSPITAL | Age: 58
End: 2018-04-15

## 2018-04-16 ENCOUNTER — ANESTHESIA EVENT (OUTPATIENT)
Dept: ENDOSCOPY | Facility: HOSPITAL | Age: 58
End: 2018-04-16
Payer: MEDICARE

## 2018-04-16 ENCOUNTER — SURGERY (OUTPATIENT)
Age: 58
End: 2018-04-16

## 2018-04-16 ENCOUNTER — ANESTHESIA (OUTPATIENT)
Dept: ENDOSCOPY | Facility: HOSPITAL | Age: 58
End: 2018-04-16
Payer: MEDICARE

## 2018-04-16 ENCOUNTER — HOSPITAL ENCOUNTER (OUTPATIENT)
Facility: HOSPITAL | Age: 58
Discharge: HOME | End: 2018-04-16
Attending: INTERNAL MEDICINE | Admitting: OTOLARYNGOLOGY
Payer: MEDICARE

## 2018-04-16 VITALS
HEIGHT: 62 IN | HEART RATE: 77 BPM | DIASTOLIC BLOOD PRESSURE: 75 MMHG | RESPIRATION RATE: 20 BRPM | SYSTOLIC BLOOD PRESSURE: 140 MMHG | TEMPERATURE: 98 F | OXYGEN SATURATION: 98 % | BODY MASS INDEX: 26.31 KG/M2 | WEIGHT: 143 LBS

## 2018-04-16 DIAGNOSIS — R10.12 LUQ PAIN: ICD-10-CM

## 2018-04-16 LAB — H PYLORI IGG SERPL QL IA: ABNORMAL

## 2018-04-16 PROCEDURE — 88342 IMHCHEM/IMCYTCHM 1ST ANTB: CPT | Mod: 26,,, | Performed by: PATHOLOGY

## 2018-04-16 PROCEDURE — 88342 IMHCHEM/IMCYTCHM 1ST ANTB: CPT | Performed by: PATHOLOGY

## 2018-04-16 PROCEDURE — 37000009 HC ANESTHESIA EA ADD 15 MINS: Performed by: INTERNAL MEDICINE

## 2018-04-16 PROCEDURE — 37000008 HC ANESTHESIA 1ST 15 MINUTES: Performed by: INTERNAL MEDICINE

## 2018-04-16 PROCEDURE — 88305 TISSUE EXAM BY PATHOLOGIST: CPT | Performed by: PATHOLOGY

## 2018-04-16 PROCEDURE — 25000003 PHARM REV CODE 250: Performed by: INTERNAL MEDICINE

## 2018-04-16 PROCEDURE — 88305 TISSUE EXAM BY PATHOLOGIST: CPT | Mod: 26,,, | Performed by: PATHOLOGY

## 2018-04-16 PROCEDURE — 63600175 PHARM REV CODE 636 W HCPCS: Performed by: NURSE ANESTHETIST, CERTIFIED REGISTERED

## 2018-04-16 PROCEDURE — 43239 EGD BIOPSY SINGLE/MULTIPLE: CPT | Mod: ,,, | Performed by: INTERNAL MEDICINE

## 2018-04-16 PROCEDURE — D9220A PRA ANESTHESIA: Mod: ANES,,, | Performed by: ANESTHESIOLOGY

## 2018-04-16 PROCEDURE — D9220A PRA ANESTHESIA: Mod: CRNA,,, | Performed by: NURSE ANESTHETIST, CERTIFIED REGISTERED

## 2018-04-16 PROCEDURE — 27201012 HC FORCEPS, HOT/COLD, DISP: Performed by: INTERNAL MEDICINE

## 2018-04-16 PROCEDURE — 43239 EGD BIOPSY SINGLE/MULTIPLE: CPT | Performed by: INTERNAL MEDICINE

## 2018-04-16 RX ORDER — SODIUM CHLORIDE 9 MG/ML
INJECTION, SOLUTION INTRAVENOUS CONTINUOUS
Status: DISCONTINUED | OUTPATIENT
Start: 2018-04-16 | End: 2018-04-16 | Stop reason: HOSPADM

## 2018-04-16 RX ORDER — PROPOFOL 10 MG/ML
VIAL (ML) INTRAVENOUS
Status: DISCONTINUED | OUTPATIENT
Start: 2018-04-16 | End: 2018-04-16

## 2018-04-16 RX ADMIN — PROPOFOL 50 MG: 10 INJECTION, EMULSION INTRAVENOUS at 11:04

## 2018-04-16 RX ADMIN — SODIUM CHLORIDE: 9 INJECTION, SOLUTION INTRAVENOUS at 10:04

## 2018-04-16 RX ADMIN — PROPOFOL 100 MG: 10 INJECTION, EMULSION INTRAVENOUS at 11:04

## 2018-04-16 NOTE — H&P
[facility]  History & Physical    Subjective:      Chief Complaint/Reason for Admission:    EGD    Wen Mares is a 57 y.o. female.    Past Medical History:   Diagnosis Date    Anxiety     Arthritis     COPD (chronic obstructive pulmonary disease)     Depression     GERD (gastroesophageal reflux disease)     Hypertension      History reviewed. No pertinent surgical history.  Family History   Problem Relation Age of Onset    Cancer Mother     Cancer Brother     Diabetes Maternal Uncle      Social History   Substance Use Topics    Smoking status: Former Smoker     Quit date: 12/29/1995    Smokeless tobacco: Never Used    Alcohol use Yes      Comment: weekends       PTA Medications   Medication Sig    aspirin (ECOTRIN) 81 MG EC tablet Take 81 mg by mouth once daily.    cyproheptadine (PERIACTIN) 4 mg tablet Take 4 mg by mouth 3 (three) times daily as needed.    docusate sodium (COLACE) 100 MG capsule Take 1 capsule (100 mg total) by mouth 3 (three) times daily as needed for Constipation.    escitalopram oxalate (LEXAPRO) 10 MG tablet Take 10 mg by mouth once daily.    ferrous sulfate 325 (65 FE) MG EC tablet Take 325 mg by mouth 3 (three) times daily with meals.      hydrochlorothiazide (HYDRODIURIL) 25 MG tablet Take 25 mg by mouth once daily.    omeprazole (PRILOSEC) 40 MG capsule Take 40 mg by mouth once daily.    oxybutynin (DITROPAN-XL) 10 MG 24 hr tablet Take 1 tablet (10 mg total) by mouth once daily.    acyclovir (ZOVIRAX) 400 MG tablet Take by mouth 2 (two) times daily.    fluconazole (DIFLUCAN) 150 MG Tab Take 150 mg by mouth once daily.    lactulose (CHRONULAC) 10 gram/15 mL solution Take by mouth 3 (three) times daily.    lorazepam (ATIVAN) 1 MG tablet Take 1 mg by mouth every 6 (six) hours as needed for Anxiety.    metronidazole (FLAGYL) 500 MG tablet Take 500 mg by mouth 3 (three) times daily.    potassium chloride (KLOR-CON) 20 mEq Pack Take 20 mEq by  mouth 4 (four) times daily.    sucralfate (CARAFATE) 100 mg/mL suspension Take 10 mLs (1 g total) by mouth 4 (four) times daily. Take only for 3 days, can cause constipation     Review of patient's allergies indicates:  No Known Allergies     Review of Systems   Constitutional: Negative for chills, fever and weight loss.   Respiratory: Negative for shortness of breath and wheezing.    Cardiovascular: Negative for chest pain.   Gastrointestinal: Positive for abdominal pain.       Objective:      Vital Signs (Most Recent)  Temp: 97.9 °F (36.6 °C) (04/16/18 1054)  Pulse: 84 (04/16/18 1054)  Resp: 18 (04/16/18 1054)  BP: (!) 143/79 (04/16/18 1054)  SpO2: 99 % (04/16/18 1054)    Vital Signs Range (Last 24H):  Temp:  [97.9 °F (36.6 °C)]   Pulse:  [84]   Resp:  [18]   BP: (143)/(79)   SpO2:  [99 %]     Physical Exam   Constitutional: She appears well-developed and well-nourished.   Cardiovascular: Normal rate.    Pulmonary/Chest: Effort normal.   Abdominal: Soft. Bowel sounds are normal. There is no tenderness. There is no guarding.   Skin: Skin is warm and dry.   Psychiatric: She has a normal mood and affect. Her behavior is normal. Judgment and thought content normal.           Assessment:      Active Hospital Problems    Diagnosis  POA    LUQ pain [R10.12]  Yes      Resolved Hospital Problems    Diagnosis Date Resolved POA   No resolved problems to display.       Plan:    Direct access EGD for LUQ pain

## 2018-04-16 NOTE — TRANSFER OF CARE
"Anesthesia Transfer of Care Note    Patient: Wen Mares    Procedure(s) Performed: Procedure(s) (LRB):  ESOPHAGOGASTRODUODENOSCOPY (EGD) (N/A)    Patient location: PACU    Anesthesia Type: general    Transport from OR: Transported from OR on room air with adequate spontaneous ventilation    Post pain: adequate analgesia    Post assessment: no apparent anesthetic complications and tolerated procedure well    Post vital signs: stable    Level of consciousness: awake, alert and lethargic    Nausea/Vomiting: no nausea/vomiting    Complications: none    Transfer of care protocol was followed      Last vitals:   Visit Vitals  /64 (BP Location: Left arm, Patient Position: Lying)   Pulse 88   Temp 36.9 °C (98.4 °F) (Temporal)   Resp 12   Ht 5' 2" (1.575 m)   Wt 64.9 kg (143 lb)   SpO2 97%   Breastfeeding? No   BMI 26.16 kg/m²     "

## 2018-04-16 NOTE — ANESTHESIA PREPROCEDURE EVALUATION
04/16/2018  Wen Mares is a 57 y.o., female.    Anesthesia Evaluation    I have reviewed the Patient Summary Reports.        Review of Systems  Anesthesia Hx:  No problems with previous Anesthesia    Social:  Non-Smoker    Hematology/Oncology:  Hematology Normal   Oncology Normal     EENT/Dental:EENT/Dental Normal   Cardiovascular:   Hypertension    Pulmonary:   COPD    Renal/:  Renal/ Normal     Hepatic/GI:   GERD    Musculoskeletal:  Musculoskeletal Normal    Neurological:  Neurology Normal    Endocrine:  Endocrine Normal    Dermatological:  Skin Normal    Psych:   anxiety          Physical Exam  General:  Well nourished    Airway/Jaw/Neck:  Airway Findings: Mouth Opening: Normal Tongue: Normal  General Airway Assessment: Adult  Mallampati: II  Improves to II with phonation.  TM Distance: Normal, at least 6 cm  Jaw/Neck Findings:  Neck ROM: Normal ROM      Dental:  Dental Findings: In tact   Chest/Lungs:  Chest/Lungs Findings: Clear to auscultation, Normal Respiratory Rate     Heart/Vascular:  Heart Findings: Rate: Normal  Rhythm: Regular Rhythm  Sounds: Normal             Anesthesia Plan  Type of Anesthesia, risks & benefits discussed:  Anesthesia Type:  general  Patient's Preference: General  Intra-op Monitoring Plan:   Intra-op Monitoring Plan Comments:   Post Op Pain Control Plan:   Post Op Pain Control Plan Comments:   Induction:   IV  Beta Blocker:  Patient is not currently on a Beta-Blocker (No further documentation required).       Informed Consent: Patient understands risks and agrees with Anesthesia plan.  Questions answered. Anesthesia consent signed with patient.  ASA Score: 3     Day of Surgery Review of History & Physical: I have interviewed and examined the patient. I have reviewed the patient's H&P dated:  There are no significant changes.          Ready For Surgery From  Anesthesia Perspective.

## 2018-04-16 NOTE — ANESTHESIA POSTPROCEDURE EVALUATION
"Anesthesia Post Evaluation    Patient: Wen Mares    Procedure(s) Performed: Procedure(s) (LRB):  ESOPHAGOGASTRODUODENOSCOPY (EGD) (N/A)    Final Anesthesia Type: general  Patient location during evaluation: PACU  Patient participation: Yes- Able to Participate  Level of consciousness: awake and alert  Post-procedure vital signs: reviewed and stable  Pain management: adequate  Airway patency: patent  PONV status at discharge: No PONV  Anesthetic complications: no      Cardiovascular status: blood pressure returned to baseline and stable  Respiratory status: unassisted  Hydration status: euvolemic  Follow-up not needed.        Visit Vitals  BP (!) 140/75   Pulse 77   Temp 36.9 °C (98.4 °F) (Temporal)   Resp 20   Ht 5' 2" (1.575 m)   Wt 64.9 kg (143 lb)   SpO2 98%   Breastfeeding? No   BMI 26.16 kg/m²       Pain/Anyi Score: Pain Assessment Performed: Yes (4/16/2018 12:37 PM)  Presence of Pain: denies (4/16/2018 12:37 PM)  Anyi Score: 10 (4/16/2018 12:37 PM)      "

## 2018-04-16 NOTE — TELEPHONE ENCOUNTER
Caller stated that she is scheduled for an EGD tomorrow and wanted to verify if she can suck on a throat lozenge. She is currently drinking clear liquids. Reviewed the endoscopy patient instruction sheet and NPO after midnight. Instructed to call back with any additional problems and/or concerns    Reason for Disposition   Health Information question, no triage required and triager able to answer question    Protocols used: ST INFORMATION ONLY CALL-A-AH

## 2018-04-16 NOTE — PROVATION PATIENT INSTRUCTIONS
Discharge Summary/Instructions after an Endoscopic Procedure  Patient Name: Wen Childs  Patient MRN: [id number]  Patient YOB: 1960 Monday, April 16, 2018  Rigo Ortiz MD  RESTRICTIONS:  During your procedure today, you received medications for sedation.  These   medications may affect your judgment, balance and coordination.  Therefore,   for 24 hours, you have the following restrictions:   - DO NOT drive a car, operate machinery, make legal/financial decisions,   sign important papers or drink alcohol.    ACTIVITY:  The following day: return to full activity including work, except no heavy   lifting, straining or running for 3 days if polyps were removed.  DIET:  Eat and drink normally unless instructed otherwise.     TREATMENT FOR COMMON SIDE EFFECTS:  - Mild abdominal pain, nausea, belching, bloating or excessive gas:  rest,   eat lightly and use a heating pad.  - Sore Throat: treat with throat lozenges and/or gargle with warm salt   water.  - Because air was used during the procedure, expelling large amounts of air   from your rectum or belching is normal.  - If a bowel prep was taken, you may not have a bowel movement for 1-3 days.    This is normal.  SYMPTOMS TO WATCH FOR AND REPORT TO YOUR PHYSICIAN:  1. Abdominal pain or bloating, other than gas cramps.  2. Chest pain.  3. Back pain.  4. Signs of infection such as: chills or fever occurring within 24 hours   after the procedure.  5. Rectal bleeding, which would show as bright red, maroon, or black stools.   (A tablespoon of blood from the rectum is not serious, especially if   hemorrhoids are present.)  6. Vomiting.  7. Weakness or dizziness.  GO DIRECTLY TO THE NEAREST EMERGENCY ROOM IF YOU HAVE ANY OF THE FOLLOWING:      Difficulty breathing              Chills and/or fever over 101 F   Persistent vomiting and/or vomiting blood   Severe abdominal pain   Severe chest pain   Black, tarry stools   Bleeding- more than one  tablespoon   Any other symptom or condition that you feel may need urgent attention  Your doctor recommends these additional instructions:  If any biopsies were taken, your doctors clinic will contact you in 1 to 2   weeks with any results.  - Discharge patient to home.   - Follow an antireflux regimen indefinitely.   - Await pathology results.   - Telephone endoscopist for pathology results in 2 weeks.   - Return to physician assistant.  For questions, problems or results please call your physician - Rigo Ortiz MD at Work:  [phone].  [facility], EMERGENCY ROOM PHONE NUMBER: [phone]  IF A COMPLICATION OR EMERGENCY SITUATION ARISES AND YOU ARE UNABLE TO REACH   YOUR PHYSICIAN - GO DIRECTLY TO THE EMERGENCY ROOM.  Rigo Ortiz MD  4/16/2018 12:04:42 PM  This report has been verified and signed electronically.

## 2018-04-17 ENCOUNTER — TELEPHONE (OUTPATIENT)
Dept: GASTROENTEROLOGY | Facility: CLINIC | Age: 58
End: 2018-04-17

## 2018-04-17 DIAGNOSIS — R93.5 ABNORMAL CT OF THE ABDOMEN: Primary | ICD-10-CM

## 2018-04-18 ENCOUNTER — TELEPHONE (OUTPATIENT)
Dept: GASTROENTEROLOGY | Facility: CLINIC | Age: 58
End: 2018-04-18

## 2018-04-19 ENCOUNTER — OFFICE VISIT (OUTPATIENT)
Dept: SURGERY | Facility: CLINIC | Age: 58
End: 2018-04-19
Payer: MEDICARE

## 2018-04-19 VITALS
HEIGHT: 62 IN | HEART RATE: 82 BPM | BODY MASS INDEX: 25.64 KG/M2 | DIASTOLIC BLOOD PRESSURE: 86 MMHG | SYSTOLIC BLOOD PRESSURE: 114 MMHG | WEIGHT: 139.31 LBS

## 2018-04-19 DIAGNOSIS — K64.8 OTHER HEMORRHOIDS: Primary | ICD-10-CM

## 2018-04-19 PROCEDURE — 99203 OFFICE O/P NEW LOW 30 MIN: CPT | Mod: S$PBB,,, | Performed by: COLON & RECTAL SURGERY

## 2018-04-19 PROCEDURE — 99999 PR PBB SHADOW E&M-EST. PATIENT-LVL III: CPT | Mod: PBBFAC,,, | Performed by: COLON & RECTAL SURGERY

## 2018-04-19 PROCEDURE — 99213 OFFICE O/P EST LOW 20 MIN: CPT | Mod: PBBFAC | Performed by: COLON & RECTAL SURGERY

## 2018-04-19 RX ORDER — ESCITALOPRAM OXALATE 20 MG/1
20 TABLET ORAL DAILY
Refills: 5 | COMMUNITY
Start: 2018-03-10 | End: 2018-11-12

## 2018-04-19 RX ORDER — POTASSIUM CHLORIDE 20 MEQ/1
TABLET, EXTENDED RELEASE ORAL
Refills: 0 | COMMUNITY
Start: 2018-04-16

## 2018-04-19 RX ORDER — ALBUTEROL SULFATE 90 UG/1
2 AEROSOL, METERED RESPIRATORY (INHALATION) EVERY 4 HOURS PRN
Refills: 3 | COMMUNITY
Start: 2018-03-13

## 2018-04-19 NOTE — LETTER
April 19, 2018      Fran Mcdowell PA-C  [address]  [facility] [address]           [facility]  [address]  [facility] [address]  Phone: [phone]          Patient: Wen Mares   MR Number: [id number]   YOB: 1960   Date of Visit: 4/19/2018       Dear Fran Mcdowell:    Thank you for referring Wen Mares to me for evaluation. Attached you will find relevant portions of my assessment and plan of care.    If you have questions, please do not hesitate to call me. I look forward to following Wen Mares along with you.    Sincerely,    Walter Samuel MD    Enclosure  CC:  No Recipients    If you would like to receive this communication electronically, please contact [email or web address] or [phone] to request more information on [employer] access.    For providers and/or their staff who would like to refer a patient to [facility], please contact us through our one-stop-shop provider referral line, [facility], at [phone].    If you feel you have received this communication in error or would no longer like to receive these types of communications, please e-mail [email or web address]

## 2018-04-19 NOTE — PROGRESS NOTES
[facility] Consultation Note    Reason for Consult:  The primary encounter diagnosis was  Hemorrhoids, unspecified hemorrhoid type, and Abdominal bloating were also pertinent to this visit.    PCP:   Cristine Austin       Referring MD:  Aaareferral Self  No address on file    HPI:  This is a 57 y.o. female here for evaluation of of hemorrhoids.  She also complains of abdominal pain and bloating.  He had recently undergone a colonoscopy by Dr. amaya and an EGD  Location- [facility] and [facility]  The bloating and hemorrhoidal discomfort has gotten worse.  She describes a prolapse with Tucks pads causing resolution.  She also states that she started on probiotics and her bowel movements have become more regular she also states that witr the gas has improved  Timing-comes and goes  +bloating and gas  - taking gas x  No N/V  Colonoscopy and EGD in 2017  Last saw another GI few months ago       Occasional GERD as long as she takes her omeprazole    Aleve - twice a month  Drinking etoh 3 times a week    Hannah screening- Family Hx of:  Colon cancer- no    Binge eating and not sleeping  at night    ROS:  Constitutional: No fevers, chills, No weight loss  ENT: No allergies  CV: No chest pain  Pulm: No cough, No shortness of breath  Ophtho: No vision changes  GI: see HPI  Derm: No rash  Heme: No lymphadenopathy,+ bruising  MSK: No arthritis  : No dysuria, No hematuria  Endo: No hot or cold intolerance  Neuro: No syncope, No seizure  Psych: No anxiety, No depression    Medical History:  has a past medical history of Anxiety; Arthritis; COPD (chronic obstructive pulmonary disease); Depression; GERD (gastroesophageal reflux disease); and Hypertension.    Surgical History:  has no past surgical history on file.    Family History: family history includes Cancer in her brother and mother; Diabetes in her maternal uncle..     Social History:  reports that she quit smoking about 22 years ago. She has never used smokeless tobacco.  "She reports that she drinks alcohol. She reports that she does not use drugs.    Review of patient's allergies indicates:  No Known Allergies    Current Outpatient Prescriptions on File Prior to Visit   Medication Sig Dispense Refill    acyclovir (ZOVIRAX) 400 MG tablet Take by mouth 2 (two) times daily.      aspirin (ECOTRIN) 81 MG EC tablet Take 81 mg by mouth once daily.      cyproheptadine (PERIACTIN) 4 mg tablet Take 4 mg by mouth 3 (three) times daily as needed.      docusate sodium (COLACE) 100 MG capsule Take 1 capsule (100 mg total) by mouth 3 (three) times daily as needed for Constipation. 60 capsule 0    escitalopram oxalate (LEXAPRO) 10 MG tablet Take 10 mg by mouth once daily.      ferrous sulfate 325 (65 FE) MG EC tablet Take 325 mg by mouth 3 (three) times daily with meals.        fluconazole (DIFLUCAN) 150 MG Tab Take 150 mg by mouth once daily.      hydrochlorothiazide (HYDRODIURIL) 25 MG tablet Take 25 mg by mouth once daily.      lactulose (CHRONULAC) 10 gram/15 mL solution Take by mouth 3 (three) times daily.      lorazepam (ATIVAN) 1 MG tablet Take 1 mg by mouth every 6 (six) hours as needed for Anxiety.      metronidazole (FLAGYL) 500 MG tablet Take 500 mg by mouth 3 (three) times daily.      omeprazole (PRILOSEC) 40 MG capsule Take 40 mg by mouth once daily.      oxybutynin (DITROPAN-XL) 10 MG 24 hr tablet Take 1 tablet (10 mg total) by mouth once daily. 30 tablet 11    potassium chloride (KLOR-CON) 20 mEq Pack Take 20 mEq by mouth 4 (four) times daily.      sucralfate (CARAFATE) 100 mg/mL suspension Take 10 mLs (1 g total) by mouth 4 (four) times daily. Take only for 3 days, can cause constipation 414 mL 0     No current facility-administered medications on file prior to visit.          Objective Findings:    Vital Signs:  /81   Pulse 86   Ht 5' 2" (1.575 m)   Wt 67.7 kg (149 lb 4 oz)   BMI 27.30 kg/m²   Body mass index is 27.3 kg/m².    Physical Exam:  General " Appearance: Well appearing in no acute distress  Head:   Normocephalic, without obvious abnormality  Eyes:    No scleral icterus  ENT: Neck supple, Lips, mucosa, and tongue normal  Lungs: CTA bilaterally in anterior and posterior fields, no wheezes, no crackles.  Heart:  Regular rate and rhythm, S1, S2 normal, no murmurs heard  Abdomen: Soft, epigastric and LUQ tenderness, non distended with positive bowel sounds in all four quadrants.  Perineum she has mild external hemorrhoidal disease digital rectal examination reveals mild internal hemorrhoidal disease         Labs:  Lab Results   Component Value Date    WBC 6.47 04/11/2018    HGB 13.9 04/11/2018    HCT 41.7 04/11/2018     04/11/2018    ALT 17 04/11/2018    AST 15 04/11/2018     04/11/2018    K 3.2 (L) 04/11/2018     04/11/2018    CREATININE 0.8 04/11/2018    BUN 12 04/11/2018    CO2 29 04/11/2018             A/P;  Abdominal bloating and increased gas production recommend that she continue with probiotics and follow-up with GI  Hemorrhoids:  Instructions Hemorrhoids  We Discussed:  Take fiber supplements such as Metamucil, Citrucel, Konsyl, etc. (Benefiber is less effective so avoid)  The goal is 1-2 nonstraining nonloose bowel movements per day.  In general we recommend about 25-30 grams of fiber daily or reaching the above bowel movement goal.  Sitz baths or tub soaks three times a day  This will cause resolution of hemorrhoids in the majority of cases.  If no better return in 1 month at that point would consider rubber band ligation

## 2018-04-20 ENCOUNTER — TELEPHONE (OUTPATIENT)
Dept: GASTROENTEROLOGY | Facility: CLINIC | Age: 58
End: 2018-04-20

## 2018-04-20 NOTE — TELEPHONE ENCOUNTER
----- Message from Rigo Ortiz MD sent at 4/20/2018  4:21 PM CDT -----  Maria T - please tell Amalia that her EGD pathology was benign and no dysplasia and No H. Pylori  SPECIMEN  1) Duodenum biopsy, rule out celiac disease.  2) Stomach biopsy, rule out H. Pylori.    FINAL PATHOLOGIC DIAGNOSIS  1. DUODENUM (BIOPSY):  -Duodenal mucosa with no significant pathologic changes  -Negative for active inflammation  -No features of sprue  -No organisms    2. STOMACH (BIOPSY):  -Mild chronic gastritis, nonactive  -Negative for active inflammation  -Helicobacter pylori immunostain is negative. The controls reacted appropriately.    Diagnosed by: Malika Sanchez M.D.

## 2018-04-22 ENCOUNTER — TELEPHONE (OUTPATIENT)
Dept: GASTROENTEROLOGY | Facility: CLINIC | Age: 58
End: 2018-04-22

## 2018-04-22 DIAGNOSIS — K29.70 GASTRITIS WITHOUT BLEEDING, UNSPECIFIED CHRONICITY, UNSPECIFIED GASTRITIS TYPE: Primary | ICD-10-CM

## 2018-04-22 RX ORDER — SUCRALFATE 1 G/1
1 TABLET ORAL 4 TIMES DAILY
Qty: 60 TABLET | Refills: 1 | Status: SHIPPED | OUTPATIENT
Start: 2018-04-22

## 2018-04-23 ENCOUNTER — TELEPHONE (OUTPATIENT)
Dept: ENDOSCOPY | Facility: HOSPITAL | Age: 58
End: 2018-04-23

## 2018-04-24 ENCOUNTER — TELEPHONE (OUTPATIENT)
Dept: GASTROENTEROLOGY | Facility: CLINIC | Age: 58
End: 2018-04-24

## 2018-04-24 NOTE — TELEPHONE ENCOUNTER
Attempted to contact patient in regards to results with no  success.    Left message for patient to call clinic.

## 2018-04-25 ENCOUNTER — TELEPHONE (OUTPATIENT)
Dept: ENDOCRINOLOGY | Facility: CLINIC | Age: 58
End: 2018-04-25

## 2018-04-25 ENCOUNTER — TELEPHONE (OUTPATIENT)
Dept: GASTROENTEROLOGY | Facility: CLINIC | Age: 58
End: 2018-04-25

## 2018-04-25 ENCOUNTER — OFFICE VISIT (OUTPATIENT)
Dept: ENDOCRINOLOGY | Facility: CLINIC | Age: 58
End: 2018-04-25
Payer: MEDICARE

## 2018-04-25 VITALS
RESPIRATION RATE: 16 BRPM | BODY MASS INDEX: 27.79 KG/M2 | HEART RATE: 62 BPM | DIASTOLIC BLOOD PRESSURE: 82 MMHG | HEIGHT: 62 IN | SYSTOLIC BLOOD PRESSURE: 120 MMHG | WEIGHT: 151 LBS

## 2018-04-25 DIAGNOSIS — M94.9 DISORDER OF CARTILAGE: ICD-10-CM

## 2018-04-25 DIAGNOSIS — R79.9 ABNORMAL FINDING OF BLOOD CHEMISTRY: ICD-10-CM

## 2018-04-25 DIAGNOSIS — N94.89 ADNEXAL MASS: ICD-10-CM

## 2018-04-25 DIAGNOSIS — M79.10 MYALGIA: ICD-10-CM

## 2018-04-25 DIAGNOSIS — I10 ESSENTIAL HYPERTENSION: ICD-10-CM

## 2018-04-25 DIAGNOSIS — E27.8 ADRENAL INCIDENTALOMA: Primary | ICD-10-CM

## 2018-04-25 PROCEDURE — 99999 PR PBB SHADOW E&M-EST. PATIENT-LVL IV: CPT | Mod: PBBFAC,,, | Performed by: INTERNAL MEDICINE

## 2018-04-25 PROCEDURE — 99214 OFFICE O/P EST MOD 30 MIN: CPT | Mod: PBBFAC | Performed by: INTERNAL MEDICINE

## 2018-04-25 PROCEDURE — 99204 OFFICE O/P NEW MOD 45 MIN: CPT | Mod: S$PBB,,, | Performed by: INTERNAL MEDICINE

## 2018-04-25 RX ORDER — DEXAMETHASONE 1 MG/1
TABLET ORAL
Qty: 1 TABLET | Refills: 0 | Status: SHIPPED | OUTPATIENT
Start: 2018-04-25 | End: 2018-06-12

## 2018-04-25 NOTE — LETTER
April 25, 2018      Fran Mcdowell PA-C  [address] [facility] [address]           [facility]  [address]  [facility] [address]  Phone: [phone]          Patient: Wen Mares   MR Number: [id number]   YOB: 1960   Date of Visit: 4/25/2018       Dear Fran Mcdowell:    Thank you for referring Wen Mares to me for evaluation. Attached you will find relevant portions of my assessment and plan of care.    If you have questions, please do not hesitate to call me. I look forward to following Wen Mares along with you.    Sincerely,    Belle Maier MD    Enclosure  CC:  No Recipients    If you would like to receive this communication electronically, please contact [email or web address] or [phone] to request more information on [facility] Link access.    For providers and/or their staff who would like to refer a patient to [facility], please contact us through our one-stop-shop provider referral line, [facility], at [phone].    If you feel you have received this communication in error or would no longer like to receive these types of communications, please e-mail [email or web address]

## 2018-04-25 NOTE — TELEPHONE ENCOUNTER
Spoke with patient , face to face in the lobby in regards to results. Follow up scheduled and appt slip has been given to patient.    She verbalizes understanding

## 2018-04-25 NOTE — TELEPHONE ENCOUNTER
Called patient and clarified labs tomorrow at 8:00am primary care building and ct scan after that at imaging center.  Reminded to take dex pill tonight at 11:00pm

## 2018-04-25 NOTE — ASSESSMENT & PLAN NOTE
reviewed incidence   Discussed that indications for surgery are size, concerning characteristics and functionality (pheochromocytoma or cortisol excess).    get dedicated adrenal imaging to assess HU and washout.  Then plan hormonal testing to include a 1 mg overnight dst, ned renin ratio and plasma metanephrines  (accepting a higher false + rate)    If hormonal evaluation is neg then plan repeat imaging in 6-12 months  Will need yearly 1 mg overnight DST x 3

## 2018-04-25 NOTE — TELEPHONE ENCOUNTER
Dex was sent in to wrong pharmacy. Called [facility] on [address] and called it in.

## 2018-04-25 NOTE — TELEPHONE ENCOUNTER
----- Message from Eunice Stein sent at 4/25/2018 12:40 PM CDT -----  Contact: Self  Pt confused on the instructions for labs tomorrow. I try to explain over 13 min. Is the blood work suppose to be for today or tomorrow? Please call pt for clarification.

## 2018-04-25 NOTE — PROGRESS NOTES
Subjective:      Patient ID: Wen Mares is a 57 y.o. female.    Chief Complaint:  Consult (Abnormal CT of the abdomen)      History of Present Illness  With regards to the adrenal incidentaloma:     Imaging done for abd pain 4/12/18  Adrenals: 1.9 cm left adrenal nodule which is indeterminate on this single phase examination.  Right adrenal gland is unremarkable.    Pt reports + abdominal discomfort.  No h/o hypertensive emergency.  Pt denies h/o paroxysmal symptoms such as syncope, pallor, dizziness   No palpitations.    No new HTN or worsening of known HTN   Dx in 2008   Does have hypokalemia - just start k supplements   No known diabetes or new hyperglycemia.  Denies polyuria, polydipsia, nocturia, unexplained weight loss or blurred vision    + easy bruisability   No abnormal stretch marks. No muscle weakness     No fractures    Last BMD:  None     Is on periactin - does eat more in the evenings - she does not feel this is contributing   She has stress - she lost her son to a motorcycle accident     Review of Systems   Constitutional: Negative for unexpected weight change.   Eyes: Negative for visual disturbance.   Respiratory: Negative for shortness of breath.    Cardiovascular: Negative for chest pain.   Gastrointestinal: Positive for abdominal distention and abdominal pain.   Musculoskeletal: Positive for arthralgias.   Skin: Negative for wound.   Neurological: Negative for headaches.   Hematological: Does not bruise/bleed easily.   Psychiatric/Behavioral: Negative for sleep disturbance.       Objective:   Physical Exam   Constitutional: She appears well-developed.   HENT:   Right Ear: External ear normal.   Left Ear: External ear normal.   Nose: Nose normal.   Hearing normal  Dentition good    Neck: No tracheal deviation present. No thyromegaly present.   Cardiovascular: Normal rate.    No murmur heard.  Pulmonary/Chest: Effort normal and breath sounds normal.   Abdominal: Soft. There is no  tenderness. No hernia.   Musculoskeletal: She exhibits no edema.   Gait normal  No clubbing or cyanosis noted   Neurological: She displays normal reflexes. No cranial nerve deficit.   Skin: No rash noted.   No nodules       Psychiatric: She has a normal mood and affect. Judgment normal.   Vitals reviewed.    +acanthosis and skin tags  Slight supraclavicular fulness  Pale thin striae  Normal proximal muscle strength    Body mass index is 27.62 kg/m².    Lab Review:   No results found for: HGBA1C  No results found for: CHOL, HDL, LDLCALC, TRIG, CHOLHDL  Lab Results   Component Value Date     04/11/2018    K 3.2 (L) 04/11/2018     04/11/2018    CO2 29 04/11/2018     04/11/2018    BUN 12 04/11/2018    CREATININE 0.8 04/11/2018    CALCIUM 9.6 04/11/2018    PROT 8.0 04/11/2018    ALBUMIN 4.3 04/11/2018    BILITOT 0.5 04/11/2018    ALKPHOS 104 04/11/2018    AST 15 04/11/2018    ALT 17 04/11/2018    ANIONGAP 10 04/11/2018    ESTGFRAFRICA >60.0 04/11/2018    EGFRNONAA >60.0 04/11/2018       Assessment and Plan     Adrenal incidentaloma  reviewed incidence   Discussed that indications for surgery are size, concerning characteristics and functionality (pheochromocytoma or cortisol excess).    get dedicated adrenal imaging to assess HU and washout.  Then plan hormonal testing to include a 1 mg overnight dst, ned renin ratio and plasma metanephrines  (accepting a higher false + rate)    If hormonal evaluation is neg then plan repeat imaging in 6-12 months  Will need yearly 1 mg overnight DST x 3           Essential hypertension  Controlled       Reviewed that she needs ot f/u with GI as the adrenal incidentaloma would not be the cause of her symptoms     Check bmd

## 2018-04-26 ENCOUNTER — HOSPITAL ENCOUNTER (OUTPATIENT)
Dept: RADIOLOGY | Facility: HOSPITAL | Age: 58
Discharge: HOME | End: 2018-04-26
Attending: INTERNAL MEDICINE
Payer: MEDICARE

## 2018-04-26 DIAGNOSIS — N94.89 ADNEXAL MASS: ICD-10-CM

## 2018-04-26 PROCEDURE — 74170 CT ABD WO CNTRST FLWD CNTRST: CPT | Mod: 26,,, | Performed by: RADIOLOGY

## 2018-04-26 PROCEDURE — 25500020 PHARM REV CODE 255: Performed by: INTERNAL MEDICINE

## 2018-04-26 PROCEDURE — 74170 CT ABD WO CNTRST FLWD CNTRST: CPT | Mod: TC

## 2018-04-26 RX ADMIN — IOHEXOL 75 ML: 350 INJECTION, SOLUTION INTRAVENOUS at 09:04

## 2018-04-27 ENCOUNTER — TELEPHONE (OUTPATIENT)
Dept: ENDOCRINOLOGY | Facility: CLINIC | Age: 58
End: 2018-04-27

## 2018-04-27 NOTE — TELEPHONE ENCOUNTER
----- Message from Kati Moncada sent at 4/27/2018 11:35 AM CDT -----  Contact: Pt    [phone]  Pt   Is returning the nurse phone call , call the pt back

## 2018-04-27 NOTE — TELEPHONE ENCOUNTER
Talk to pt about her ct scan results and her vitamin d lab results. Pt will like her results and I will mail it to her.

## 2018-04-30 ENCOUNTER — TELEPHONE (OUTPATIENT)
Dept: ENDOCRINOLOGY | Facility: CLINIC | Age: 58
End: 2018-04-30

## 2018-04-30 NOTE — TELEPHONE ENCOUNTER
----- Message from Belle Maier MD sent at 4/27/2018 10:58 AM CDT -----  The low cortisol is the expected normal response after dexamethasone is taken.   Other labs pending

## 2018-04-30 NOTE — TELEPHONE ENCOUNTER
----- Message from Belle Maier MD sent at 4/27/2018 11:10 AM CDT -----  Please let her know the adrenal imaging was good - it look benign  We will repeat this in 12 months    Also let her know that there was thickening of the stomach wall  She should follow up with GI about this     Ok to send her a copy if she would like

## 2018-05-01 ENCOUNTER — TELEPHONE (OUTPATIENT)
Dept: ENDOCRINOLOGY | Facility: CLINIC | Age: 58
End: 2018-05-01

## 2018-05-01 DIAGNOSIS — N94.89 ADNEXAL MASS: ICD-10-CM

## 2018-05-01 RX ORDER — DEXAMETHASONE 1 MG/1
TABLET ORAL
Qty: 1 TABLET | Refills: 0 | Status: SHIPPED | OUTPATIENT
Start: 2018-05-01 | End: 2018-06-12

## 2018-05-01 NOTE — TELEPHONE ENCOUNTER
Left vm message stating this.  Also reminded her to call her GI Dr Ortiz and ask him to review her recent imaging with findings of thickend stomach lining.  I also mailed her a copy of the report for her records

## 2018-06-12 ENCOUNTER — OFFICE VISIT (OUTPATIENT)
Dept: SURGERY | Facility: CLINIC | Age: 58
End: 2018-06-12
Payer: MEDICARE

## 2018-06-12 VITALS
WEIGHT: 147.25 LBS | HEIGHT: 62 IN | BODY MASS INDEX: 27.1 KG/M2 | HEART RATE: 86 BPM | SYSTOLIC BLOOD PRESSURE: 116 MMHG | DIASTOLIC BLOOD PRESSURE: 86 MMHG

## 2018-06-12 DIAGNOSIS — K64.2 GRADE III HEMORRHOIDS: ICD-10-CM

## 2018-06-12 PROCEDURE — 99999 PR PBB SHADOW E&M-EST. PATIENT-LVL III: CPT | Mod: PBBFAC,,, | Performed by: COLON & RECTAL SURGERY

## 2018-06-12 PROCEDURE — 99213 OFFICE O/P EST LOW 20 MIN: CPT | Mod: PBBFAC | Performed by: COLON & RECTAL SURGERY

## 2018-06-12 PROCEDURE — 99213 OFFICE O/P EST LOW 20 MIN: CPT | Mod: S$PBB,,, | Performed by: COLON & RECTAL SURGERY

## 2018-06-12 NOTE — LETTER
June 12, 2018      MERRITT Solo  [address]  [place] [address]           [facility]  [address]  [facility] [address]  Phone: [phone]          Patient: Wen Mares   MR Number: [id number]   YOB: 1960   Date of Visit: 6/12/2018       Dear Cristine Austin:    Thank you for referring Wen Mares to me for evaluation. Attached you will find relevant portions of my assessment and plan of care.    If you have questions, please do not hesitate to call me. I look forward to following Wen Mares along with you.    Sincerely,    Walter Samuel MD    Enclosure  CC:  No Recipients    If you would like to receive this communication electronically, please contact [email or web address] or [phone] to request more information on [facility] Link access.    For providers and/or their staff who would like to refer a patient to [facility], please contact us through our one-stop-shop provider referral line, [facility] , at [phone].    If you feel you have received this communication in error or would no longer like to receive these types of communications, please e-mail [email or web address]

## 2018-06-12 NOTE — PROGRESS NOTES
[facility] Office Visit    SUBJECTIVE:     Chief Complaint: F/U hemorrhoids    History of Present Illness:  Patient is a 57 y.o. female presents with long-standing history of prolapsing hemorrhoids, last seen in clinic on 4/19 by Dr. Samuel, encouraged to take daily fiber supplementation to get 1-2 non-straining bowel movements daily. She, however, does not take any fiber supplementation. She states she has a BM without straining every morning ever since starting a probiotic. She does sit for prolonged periods on the toilet on her phone. She continues to have issues of prolapse, has had a few episodes of rectal bleeding as well. She applies a variety of creams and wipes without much improvement. Last colonoscopy 2017 (no records).    Review of patient's allergies indicates:  No Known Allergies    Past Medical History:   Diagnosis Date    Anxiety     Arthritis     COPD (chronic obstructive pulmonary disease)     Depression     GERD (gastroesophageal reflux disease)     Hypertension      No past surgical history on file.  Family History   Problem Relation Age of Onset    Cancer Mother     Cancer Brother     Diabetes Maternal Uncle      Social History   Substance Use Topics    Smoking status: Former Smoker     Quit date: 12/29/1995    Smokeless tobacco: Never Used    Alcohol use Yes      Comment: weekends       OBJECTIVE:     Vital Signs (Most Recent)       Physical Exam:  General: Black or  female in NAD sitting in chair in clinic  Neuro: aaox4 maex4 perrl  Respiratory: resps even unlabored  Cardiac: cap refill <2 sec  Abdomen: Normal, benign.  Extremities: Warm dry and intact  Anorectal: Prolapsed internal hemorrhoid, manually reducible, no evidence of bleeding, no external hemorrhoids    ASSESSMENT/PLAN:     Diagnoses and all orders for this visit:    Grade III hemorrhoids    Reiterated the importance of taking fiber supplementation to get 25-30g of fiber daily. Information on a high fiber  diet given. Will trial this for 2 weeks and, if no improvement, then will attempt RBL.  Have also explained to her that we are trying to avoid surgical intervention with conservative management however if rubber-band fails she would need a hemorrhoidectomy  Have seen and examined the patient with the fellow and agree with their plan.  SHOBHA BAIRD

## 2018-07-24 ENCOUNTER — TELEPHONE (OUTPATIENT)
Dept: GASTROENTEROLOGY | Facility: CLINIC | Age: 58
End: 2018-07-24

## 2018-07-24 NOTE — TELEPHONE ENCOUNTER
Spoke with patient and she is upset because she states that she has been here 8 times and she never gets her results back.States she would like a call from Fran today discuss her results. She is not sure if she will come tomorrow.

## 2018-07-26 ENCOUNTER — HOSPITAL ENCOUNTER (EMERGENCY)
Facility: OTHER | Age: 58
Discharge: HOME | End: 2018-07-26
Attending: EMERGENCY MEDICINE
Payer: COMMERCIAL

## 2018-07-26 ENCOUNTER — TELEPHONE (OUTPATIENT)
Dept: GASTROENTEROLOGY | Facility: CLINIC | Age: 58
End: 2018-07-26

## 2018-07-26 VITALS
WEIGHT: 155 LBS | RESPIRATION RATE: 18 BRPM | HEIGHT: 62 IN | BODY MASS INDEX: 28.52 KG/M2 | HEART RATE: 61 BPM | SYSTOLIC BLOOD PRESSURE: 139 MMHG | OXYGEN SATURATION: 100 % | DIASTOLIC BLOOD PRESSURE: 77 MMHG | TEMPERATURE: 98 F

## 2018-07-26 DIAGNOSIS — M79.602 LEFT ARM PAIN: ICD-10-CM

## 2018-07-26 DIAGNOSIS — M25.552 LEFT HIP PAIN: ICD-10-CM
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
## 2018-07-26 DIAGNOSIS — M25.522 LEFT ELBOW PAIN: ICD-10-CM

## 2018-07-26 DIAGNOSIS — R10.13 EPIGASTRIC PAIN: Primary | ICD-10-CM

## 2018-07-26 DIAGNOSIS — M25.512 ACUTE PAIN OF LEFT SHOULDER: ICD-10-CM

## 2018-07-26 DIAGNOSIS — M25.562 ACUTE PAIN OF LEFT KNEE: ICD-10-CM

## 2018-07-26 PROCEDURE — 99284 EMERGENCY DEPT VISIT MOD MDM: CPT | Mod: 25

## 2018-07-26 PROCEDURE — 63600175 PHARM REV CODE 636 W HCPCS: Performed by: EMERGENCY MEDICINE

## 2018-07-26 PROCEDURE — 96372 THER/PROPH/DIAG INJ SC/IM: CPT

## 2018-07-26 RX ORDER — CYCLOBENZAPRINE HCL 10 MG
10 TABLET ORAL 3 TIMES DAILY PRN
Qty: 15 TABLET | Refills: 0 | Status: SHIPPED | OUTPATIENT
Start: 2018-07-26 | End: 2018-07-31

## 2018-07-26 RX ORDER — KETOROLAC TROMETHAMINE 30 MG/ML
15 INJECTION, SOLUTION INTRAMUSCULAR; INTRAVENOUS
Status: COMPLETED | OUTPATIENT
Start: 2018-07-26 | End: 2018-07-26

## 2018-07-26 RX ORDER — IBUPROFEN 400 MG/1
400 TABLET ORAL 3 TIMES DAILY PRN
Qty: 20 TABLET | Refills: 0 | Status: SHIPPED | OUTPATIENT
Start: 2018-07-26 | End: 2019-02-21

## 2018-07-26 RX ADMIN — KETOROLAC TROMETHAMINE 15 MG: 30 INJECTION, SOLUTION INTRAMUSCULAR at 07:07

## 2018-07-26 NOTE — TELEPHONE ENCOUNTER
Spoke with pt. She is still having upper abdominal pain despite taking carafate. Some recent constipation which has improved in the past 2 days. Will not give bentyl for pain.    Consider functional dyspepsia. Try FDguard
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
## 2018-10-23 ENCOUNTER — TELEPHONE (OUTPATIENT)
Dept: GASTROENTEROLOGY | Facility: CLINIC | Age: 58
End: 2018-10-23

## 2018-10-23 NOTE — TELEPHONE ENCOUNTER
Spoke with pt to inform that she was scheduled incorrectly and to reschedule her with Fran. Pt said she was already rescheduled for another appt.

## 2018-11-12 ENCOUNTER — TELEPHONE (OUTPATIENT)
Dept: ENDOSCOPY | Facility: HOSPITAL | Age: 58
End: 2018-11-12

## 2018-11-12 ENCOUNTER — OFFICE VISIT (OUTPATIENT)
Dept: GASTROENTEROLOGY | Facility: CLINIC | Age: 58
End: 2018-11-12
Payer: MEDICARE

## 2018-11-12 VITALS
WEIGHT: 145.06 LBS | HEART RATE: 82 BPM | HEIGHT: 62 IN | DIASTOLIC BLOOD PRESSURE: 93 MMHG | BODY MASS INDEX: 26.69 KG/M2 | SYSTOLIC BLOOD PRESSURE: 136 MMHG

## 2018-11-12 DIAGNOSIS — Z12.11 SCREEN FOR COLON CANCER: Primary | ICD-10-CM

## 2018-11-12 DIAGNOSIS — Z12.11 COLON CANCER SCREENING: Primary | ICD-10-CM

## 2018-11-12 DIAGNOSIS — R19.8 CHANGE IN BOWEL MOVEMENT: ICD-10-CM

## 2018-11-12 PROCEDURE — 99214 OFFICE O/P EST MOD 30 MIN: CPT | Mod: PBBFAC | Performed by: INTERNAL MEDICINE

## 2018-11-12 PROCEDURE — 99999 PR PBB SHADOW E&M-EST. PATIENT-LVL IV: CPT | Mod: PBBFAC,,, | Performed by: INTERNAL MEDICINE

## 2018-11-12 PROCEDURE — 99214 OFFICE O/P EST MOD 30 MIN: CPT | Mod: S$PBB,,, | Performed by: INTERNAL MEDICINE

## 2018-11-12 RX ORDER — SODIUM, POTASSIUM,MAG SULFATES 17.5-3.13G
SOLUTION, RECONSTITUTED, ORAL ORAL
Qty: 1 BOTTLE | Refills: 0 | Status: ON HOLD | OUTPATIENT
Start: 2018-11-12 | End: 2020-03-10 | Stop reason: HOSPADM

## 2018-11-12 RX ORDER — MIRTAZAPINE 7.5 MG/1
7.5 TABLET, FILM COATED ORAL NIGHTLY
Qty: 30 TABLET | Refills: 1 | Status: SHIPPED | OUTPATIENT
Start: 2018-11-12 | End: 2019-02-21

## 2018-11-12 NOTE — PROGRESS NOTES
REASON FOR VISIT:  Weight loss and mucus in the stool.    HISTORY OF PRESENT ILLNESS:  Ms. Mares is a 57-year-old who is following up   for clinic visit after seeing Ms. Mariahcelena Jeremias in April.  She was   complaining of abdominal pain, mostly in the left upper quadrant and   subsequently underwent an upper endoscopy, which was rather unremarkable.    Biopsies were negative for H. pylori and small bowel biopsies were negative for   celiac.  CT imaging revealed an adrenal adenoma and has been followed by   Endocrinology.  She currently is concerned about weight loss and this new very   foul smelling mucus after her bowel movement.  She has been taking probiotic   with regular bowel movements without any blood.  She denies any nausea or   vomiting.  No dysphagia or odynophagia.    PAST MEDICAL, SURGICAL, SOCIAL AND FAMILY HISTORY:  Reviewed.    MEDICATIONS AND ALLERGIES:  Reviewed.    REVIEW OF SYSTEMS:  CONSTITUTIONAL:  No fever, no chills.  Concern about weight loss, but reviewing   her chart, it appears that her weight is stable.  Appetite is normal.  EYES:  No visual changes.  ENT:  No odynophagia or hoarseness of voice.  CARDIOVASCULAR:  No angina or palpitation.  RESPIRATORY:  No shortness of breath or wheezing.  GENITOURINARY:  Complains of frequency of urination.  No blood in the urine.  MUSCULOSKELETAL:  No myalgia, arthralgia.  SKIN:  No bruising.  NEUROLOGIC:  No headache.  PSYCHIATRY:  Complains of significant anxiety and stress with multiple friends   and family members dying and too much on her plate.  GASTROINTESTINAL:  See HPI.    PHYSICAL EXAMINATION:  VITAL SIGNS:  See Epic.  GENERAL:   Alert and oriented x3, no acute distress.  NECK:  Supple.  No carotid venous.  No cervical adenopathy.  ABDOMEN:  Slightly obese, soft, nondistended, nontender.  No masses palpable.    No hepatosplenomegaly appreciated.  Bowel sounds are normal.  EYES:  Conjunctivae anicteric.  ENT:  Oropharynx, mucosa moist.   Mallampati 1.  CARDIOVASCULAR:  S1, S2 normal.  RESPIRATORY:  Bilateral air entry equal.  SKIN:  No palmar erythema or spider angiomata.  NEUROLOGIC:  No asterixis.  PSYCHIATRY:  Very emotionally labile with tearing up discussing all the   stressors in her life.  LOWER EXTREMITY:  No pedal edema.    IMPRESSION:  1.  Changes in bowels with foul smelling mucus and the patient concerned about   weight loss now.  2.  Significant stressors in life, possibly influencing her GI complaints.    RECOMMENDATION:  1.  Proceed with colonoscopy.  2.  Start Remeron 7.5 mg nightly.  3.  Start Florastor one capsule daily.  4.  Follow up in GI Clinic in eight weeks.      ACT/HN  dd: 11/12/2018 16:07:34 (CST)  td: 11/13/2018 11:23:52 (CST)  Doc ID   #[id number]  Job ID #[id number]    CC:

## 2018-11-12 NOTE — H&P (VIEW-ONLY)
REASON FOR VISIT:  Weight loss and mucus in the stool.    HISTORY OF PRESENT ILLNESS:  Ms. Mares is a 57-year-old who is following up   for clinic visit after seeing Ms. Mariahcelena Jeremias in April.  She was   complaining of abdominal pain, mostly in the left upper quadrant and   subsequently underwent an upper endoscopy, which was rather unremarkable.    Biopsies were negative for H. pylori and small bowel biopsies were negative for   celiac.  CT imaging revealed an adrenal adenoma and has been followed by   Endocrinology.  She currently is concerned about weight loss and this new very   foul smelling mucus after her bowel movement.  She has been taking probiotic   with regular bowel movements without any blood.  She denies any nausea or   vomiting.  No dysphagia or odynophagia.    PAST MEDICAL, SURGICAL, SOCIAL AND FAMILY HISTORY:  Reviewed.    MEDICATIONS AND ALLERGIES:  Reviewed.    REVIEW OF SYSTEMS:  CONSTITUTIONAL:  No fever, no chills.  Concern about weight loss, but reviewing   her chart, it appears that her weight is stable.  Appetite is normal.  EYES:  No visual changes.  ENT:  No odynophagia or hoarseness of voice.  CARDIOVASCULAR:  No angina or palpitation.  RESPIRATORY:  No shortness of breath or wheezing.  GENITOURINARY:  Complains of frequency of urination.  No blood in the urine.  MUSCULOSKELETAL:  No myalgia, arthralgia.  SKIN:  No bruising.  NEUROLOGIC:  No headache.  PSYCHIATRY:  Complains of significant anxiety and stress with multiple friends   and family members dying and too much on her plate.  GASTROINTESTINAL:  See HPI.    PHYSICAL EXAMINATION:  VITAL SIGNS:  See Epic.  GENERAL:   Alert and oriented x3, no acute distress.  NECK:  Supple.  No carotid venous.  No cervical adenopathy.  ABDOMEN:  Slightly obese, soft, nondistended, nontender.  No masses palpable.    No hepatosplenomegaly appreciated.  Bowel sounds are normal.  EYES:  Conjunctivae anicteric.  ENT:  Oropharynx, mucosa moist.   Mallampati 1.  CARDIOVASCULAR:  S1, S2 normal.  RESPIRATORY:  Bilateral air entry equal.  SKIN:  No palmar erythema or spider angiomata.  NEUROLOGIC:  No asterixis.  PSYCHIATRY:  Very emotionally labile with tearing up discussing all the   stressors in her life.  LOWER EXTREMITY:  No pedal edema.    IMPRESSION:  1.  Changes in bowels with foul smelling mucus and the patient concerned about   weight loss now.  2.  Significant stressors in life, possibly influencing her GI complaints.    RECOMMENDATION:  1.  Proceed with colonoscopy.  2.  Start Remeron 7.5 mg nightly.  3.  Start Florastor one capsule daily.  4.  Follow up in GI Clinic in eight weeks.      ACT/HN  dd: 11/12/2018 16:07:34 (CST)  td: 11/13/2018 11:23:52 (CST)  Doc ID   #[id number]  Job ID #[id number]    CC:

## 2018-11-12 NOTE — TELEPHONE ENCOUNTER
Patient in office to schedule colonoscopy.   Stressed the importance of needing a ride to accompany and stay with her. Patient verbalized understanding.     Patient offered first avail with Dr. Moncada 11/23, patient decline due to the day before being Thanksgiving.  Patient given next available date of 12/6 at 3:30. Patient states that will have to do bc she cannot wait any longer.    Started to explain to patient prep instructions. Patient verbalized worry of having late appointment and not eating the day before. Explained to patient that I will go over PM prep instructions with her.    Reviewed prep choices with patient and patient is wanting Suprep, bc its a lower volume. Advised patient that I wasn't sure of cost. Patient verbalized understanding.    Reviewed PM prep Suprep instructions.   Patient is still fixated on not eating a full meal the day before procedure. I went over the reasons, explanations and the importance on why we do preps the way that we do for her safety. Patient verbalized understanding.    Advised patient to make sure she has ride and to take her morning medications except for her Aspirin the day of procedure. Patient verbalized understanding.    Advised patient she can call my direct line at bottom of her paperwork if she feels confused or wants to go over prep instructions some more when it gets closer.

## 2018-11-13 ENCOUNTER — OFFICE VISIT (OUTPATIENT)
Dept: UROLOGY | Facility: CLINIC | Age: 58
End: 2018-11-13
Payer: MEDICARE

## 2018-11-13 VITALS
SYSTOLIC BLOOD PRESSURE: 126 MMHG | HEART RATE: 83 BPM | HEIGHT: 62 IN | DIASTOLIC BLOOD PRESSURE: 87 MMHG | BODY MASS INDEX: 27.34 KG/M2 | WEIGHT: 148.56 LBS

## 2018-11-13 DIAGNOSIS — R39.15 URGENCY OF URINATION: Primary | ICD-10-CM

## 2018-11-13 DIAGNOSIS — R35.1 NOCTURIA: ICD-10-CM

## 2018-11-13 DIAGNOSIS — R39.14 FEELING OF INCOMPLETE BLADDER EMPTYING: ICD-10-CM

## 2018-11-13 DIAGNOSIS — R35.0 FREQUENCY OF URINATION: ICD-10-CM

## 2018-11-13 PROCEDURE — 99214 OFFICE O/P EST MOD 30 MIN: CPT | Mod: S$PBB,,, | Performed by: PHYSICIAN ASSISTANT

## 2018-11-13 PROCEDURE — 99999 PR PBB SHADOW E&M-EST. PATIENT-LVL III: CPT | Mod: PBBFAC,,, | Performed by: PHYSICIAN ASSISTANT

## 2018-11-13 PROCEDURE — 99213 OFFICE O/P EST LOW 20 MIN: CPT | Mod: PBBFAC | Performed by: PHYSICIAN ASSISTANT

## 2018-11-13 RX ORDER — TROSPIUM CHLORIDE ER 60 MG/1
60 CAPSULE ORAL DAILY
Qty: 30 CAPSULE | Refills: 11 | Status: SHIPPED | OUTPATIENT
Start: 2018-11-13 | End: 2018-11-13 | Stop reason: ALTCHOICE

## 2018-11-13 RX ORDER — OXYBUTYNIN CHLORIDE 10 MG/1
10 TABLET, EXTENDED RELEASE ORAL DAILY PRN
Refills: 1 | COMMUNITY
Start: 2018-09-26 | End: 2018-11-13

## 2018-11-13 RX ORDER — HYDROCODONE BITARTRATE AND ACETAMINOPHEN 5; 325 MG/1; MG/1
1 TABLET ORAL 2 TIMES DAILY PRN
Refills: 0 | COMMUNITY
Start: 2018-10-19 | End: 2019-02-21

## 2018-11-13 NOTE — PROGRESS NOTES
CHIEF COMPLAINT:    Mrs. Mares is a 57 y.o. female presenting for urinary frequency.  PRESENTING ILLNESS:    Wen Mares is a 57 y.o. female with a PMH of frequency who presents for urinary frequency.  .   Sometimes she has the strong urge to void with just a small amount of urine that is voided.  She reports nocturia x 2-3 but she slept good last night.    She reports frequency and incomplete bladder emptying completely.  She denies urinary incontinence.    She was taking detrol but it was changed to oxybutynin.  Oxybutynin is not controlling her urinary symptoms.  She has been getting dry mouth.  She has very bad anxiety and is starting on medication.  She drinks petra ice tea.        REVIEW OF SYSTEMS:    Constitutional: Negative for fever and chills.   HENT: Negative for hearing loss.   Eyes: Negative for visual disturbance.   Respiratory: Negative for shortness of breath.   Cardiovascular: Negative for chest pain.   Gastrointestinal: Negative for vomiting, and constipation.   Genitourinary:  See HPI  Neurological: Negative for dizziness.   Hematological: Does not bruise/bleed easily.   Psychiatric/Behavioral: Negative for confusion.     PATIENT HISTORY:    Past Medical History:   Diagnosis Date    Anxiety     Arthritis     COPD (chronic obstructive pulmonary disease)     Depression     GERD (gastroesophageal reflux disease)     Hypertension        Past Surgical History:   Procedure Laterality Date    ESOPHAGOGASTRODUODENOSCOPY (EGD) N/A 4/16/2018    Performed by Rigo Ortiz MD at [facility] ([address] [place] [address])       Family History   Problem Relation Age of Onset    Cancer Mother     Cancer Brother     Diabetes Maternal Uncle        Social History     Socioeconomic History    Marital status: Single     Spouse name: Not on file    Number of children: Not on file    Years of education: Not on file    Highest education level: Not on file   Social Needs    Financial resource strain: Not on file     Food insecurity - worry: Not on file    Food insecurity - inability: Not on file    Transportation needs - medical: Not on file    Transportation needs - non-medical: Not on file   Occupational History    Not on file   Tobacco Use    Smoking status: Former Smoker     Last attempt to quit: 1995     Years since quittin.8    Smokeless tobacco: Never Used   Substance and Sexual Activity    Alcohol use: Yes     Comment: weekends    Drug use: No    Sexual activity: Not on file   Other Topics Concern    Not on file   Social History Narrative    Not on file       Allergies:  Patient has no known allergies.    Medications:    Current Outpatient Medications:     cyproheptadine (PERIACTIN) 4 mg tablet, Take 4 mg by mouth 3 (three) times daily as needed., Disp: , Rfl:     ferrous sulfate 325 (65 FE) MG EC tablet, Take 325 mg by mouth 3 (three) times daily with meals.  , Disp: , Rfl:     hydrochlorothiazide (HYDRODIURIL) 25 MG tablet, Take 25 mg by mouth once daily., Disp: , Rfl:     HYDROcodone-acetaminophen (NORCO) 5-325 mg per tablet, Take 1 tablet by mouth 2 (two) times daily as needed., Disp: , Rfl: 0    omeprazole (PRILOSEC) 40 MG capsule, Take 40 mg by mouth once daily., Disp: , Rfl:     potassium chloride SA (K-DUR,KLOR-CON) 20 MEQ tablet, TAKE 1 TABLET (20 MEQ) BY ORAL ROUTE ONCE DAILY WITH FOOD, Disp: , Rfl: 0    PROAIR HFA 90 mcg/actuation inhaler, Inhale 2 puffs into the lungs every 4 (four) hours as needed., Disp: , Rfl: 3    sucralfate (CARAFATE) 1 gram tablet, Take 1 tablet (1 g total) by mouth 4 (four) times daily. Separate from other meds by 1 hr., Disp: 60 tablet, Rfl: 1    acyclovir (ZOVIRAX) 400 MG tablet, Take by mouth 2 (two) times daily., Disp: , Rfl:     aspirin (ECOTRIN) 81 MG EC tablet, Take 81 mg by mouth once daily., Disp: , Rfl:     fluconazole (DIFLUCAN) 150 MG Tab, Take 150 mg by mouth once daily., Disp: , Rfl:     ibuprofen (ADVIL,MOTRIN) 400 MG tablet, Take 1  tablet (400 mg total) by mouth 3 (three) times daily as needed., Disp: 20 tablet, Rfl: 0    mirabegron (MYRBETRIQ) 25 mg Tb24 ER tablet, Take 1 tablet (25 mg total) by mouth once daily., Disp: 30 tablet, Rfl: 11    mirtazapine (REMERON) 7.5 MG Tab, Take 1 tablet (7.5 mg total) by mouth every evening., Disp: 30 tablet, Rfl: 1    sodium,potassium,mag sulfates (SUPREP BOWEL PREP KIT) 17.5-3.13-1.6 gram SolR, Please dispense as directed/ 1 kit, Disp: 1 Bottle, Rfl: 0    PHYSICAL EXAMINATION:    Constitutional: She appears well-developed and well-nourished.  She is in no apparent distress.    Eyes: No scleral icterus noted bilaterally. No discharge bilaterally.    Nose: No rhinorrhea    Cardiovascular: Normal rate.  No pitting edema noted in lower extremities bilaterally    Pulmonary/Chest: Effort normal. No respiratory distress.     Abdominal:  She exhibits no distension.  There is no CVA tenderness.     Lymphadenopathy:          Right: No supraclavicular adenopathy present.        Left: No supraclavicular adenopathy present.     Neurological: She is alert and oriented to person, place, and time.     Skin: Skin is warm and dry.     Psych: Cooperative with normal affect.    Bladder scan performed by Nurse Montilla.  PVR 36ml  Physical Exam      LABS:    U/a: 1.005, pH 7, negative    IMPRESSION:    Encounter Diagnoses   Name Primary?    Urgency of urination Yes    Frequency of urination     Nocturia     Feeling of incomplete bladder emptying        PLAN:    Stop oxybutynin.  I sent sanctura to the pharmacy. Pharmacy called while patient was in the office stating it is not covered by insurance.   We will try myrbetriq. Side effects discussed.  Take BP once daily for the first 2 weeks.  She will call and stop the myrbetriq if her BP becomes elevated.    If no improvement with mybetriq we will schedule urodynamics.      Follow up in 6 weeks to reassess symptoms.            Tootie Peters PA-C

## 2018-12-06 ENCOUNTER — HOSPITAL ENCOUNTER (OUTPATIENT)
Facility: HOSPITAL | Age: 58
Discharge: HOME | End: 2018-12-06
Attending: INTERNAL MEDICINE | Admitting: INTERNAL MEDICINE
Payer: MEDICARE

## 2018-12-06 ENCOUNTER — ANESTHESIA EVENT (OUTPATIENT)
Dept: ENDOSCOPY | Facility: HOSPITAL | Age: 58
End: 2018-12-06
Payer: MEDICARE

## 2018-12-06 ENCOUNTER — ANESTHESIA (OUTPATIENT)
Dept: ENDOSCOPY | Facility: HOSPITAL | Age: 58
End: 2018-12-06
Payer: MEDICARE

## 2018-12-06 VITALS
RESPIRATION RATE: 18 BRPM | TEMPERATURE: 98 F | BODY MASS INDEX: 27.05 KG/M2 | SYSTOLIC BLOOD PRESSURE: 148 MMHG | DIASTOLIC BLOOD PRESSURE: 75 MMHG | WEIGHT: 147 LBS | HEIGHT: 62 IN | HEART RATE: 74 BPM | OXYGEN SATURATION: 100 %

## 2018-12-06 DIAGNOSIS — R19.8 CHANGE IN BOWEL FUNCTION: Primary | ICD-10-CM

## 2018-12-06 DIAGNOSIS — R19.4 CHANGE IN BOWEL HABITS: ICD-10-CM

## 2018-12-06 PROCEDURE — E9220 PRA ENDO ANESTHESIA: HCPCS | Mod: ,,, | Performed by: NURSE ANESTHETIST, CERTIFIED REGISTERED

## 2018-12-06 PROCEDURE — 37000008 HC ANESTHESIA 1ST 15 MINUTES: Performed by: INTERNAL MEDICINE

## 2018-12-06 PROCEDURE — 45380 COLONOSCOPY AND BIOPSY: CPT | Mod: ,,, | Performed by: INTERNAL MEDICINE

## 2018-12-06 PROCEDURE — 88305 TISSUE EXAM BY PATHOLOGIST: CPT | Mod: 26,,, | Performed by: PATHOLOGY

## 2018-12-06 PROCEDURE — 37000009 HC ANESTHESIA EA ADD 15 MINS: Performed by: INTERNAL MEDICINE

## 2018-12-06 PROCEDURE — 63600175 PHARM REV CODE 636 W HCPCS: Performed by: NURSE ANESTHETIST, CERTIFIED REGISTERED

## 2018-12-06 PROCEDURE — 27201012 HC FORCEPS, HOT/COLD, DISP: Performed by: INTERNAL MEDICINE

## 2018-12-06 PROCEDURE — 88305 TISSUE EXAM BY PATHOLOGIST: CPT | Performed by: PATHOLOGY

## 2018-12-06 PROCEDURE — 45380 COLONOSCOPY AND BIOPSY: CPT | Performed by: INTERNAL MEDICINE

## 2018-12-06 PROCEDURE — 25000003 PHARM REV CODE 250: Performed by: NURSE ANESTHETIST, CERTIFIED REGISTERED

## 2018-12-06 PROCEDURE — 25000003 PHARM REV CODE 250: Performed by: INTERNAL MEDICINE

## 2018-12-06 RX ORDER — PROPOFOL 10 MG/ML
VIAL (ML) INTRAVENOUS CONTINUOUS PRN
Status: DISCONTINUED | OUTPATIENT
Start: 2018-12-06 | End: 2018-12-06

## 2018-12-06 RX ORDER — PROPOFOL 10 MG/ML
VIAL (ML) INTRAVENOUS
Status: DISCONTINUED | OUTPATIENT
Start: 2018-12-06 | End: 2018-12-06

## 2018-12-06 RX ORDER — SODIUM CHLORIDE 9 MG/ML
INJECTION, SOLUTION INTRAVENOUS CONTINUOUS
Status: DISCONTINUED | OUTPATIENT
Start: 2018-12-06 | End: 2018-12-06 | Stop reason: HOSPADM

## 2018-12-06 RX ORDER — SODIUM CHLORIDE 9 MG/ML
INJECTION, SOLUTION INTRAVENOUS CONTINUOUS PRN
Status: DISCONTINUED | OUTPATIENT
Start: 2018-12-06 | End: 2018-12-06

## 2018-12-06 RX ORDER — LIDOCAINE HCL/PF 100 MG/5ML
SYRINGE (ML) INTRAVENOUS
Status: DISCONTINUED | OUTPATIENT
Start: 2018-12-06 | End: 2018-12-06

## 2018-12-06 RX ADMIN — PROPOFOL 30 MG: 10 INJECTION, EMULSION INTRAVENOUS at 01:12

## 2018-12-06 RX ADMIN — SODIUM CHLORIDE: 0.9 INJECTION, SOLUTION INTRAVENOUS at 12:12

## 2018-12-06 RX ADMIN — PROPOFOL 150 MCG/KG/MIN: 10 INJECTION, EMULSION INTRAVENOUS at 01:12

## 2018-12-06 RX ADMIN — SODIUM CHLORIDE: 0.9 INJECTION, SOLUTION INTRAVENOUS at 01:12

## 2018-12-06 RX ADMIN — LIDOCAINE HYDROCHLORIDE 50 MG: 20 INJECTION, SOLUTION INTRAVENOUS at 01:12

## 2018-12-06 RX ADMIN — PROPOFOL 70 MG: 10 INJECTION, EMULSION INTRAVENOUS at 01:12

## 2018-12-06 NOTE — ANESTHESIA PREPROCEDURE EVALUATION
12/06/2018  Wen Mares is a 58 y.o., female.  Past Medical History:   Diagnosis Date    Anxiety     Arthritis     COPD (chronic obstructive pulmonary disease)     Depression     GERD (gastroesophageal reflux disease)     Hypertension      Past Surgical History:   Procedure Laterality Date    ESOPHAGOGASTRODUODENOSCOPY (EGD) N/A 4/16/2018    Performed by Rigo Ortiz MD at [facility] ([address] [place] [address])       Anesthesia Evaluation    I have reviewed the Patient Summary Reports.    I have reviewed the Nursing Notes.   I have reviewed the Medications.     Review of Systems  Anesthesia Hx:  No problems with previous Anesthesia  Neg history of prior surgery. Denies Family Hx of Anesthesia complications.   Denies Personal Hx of Anesthesia complications.   Hematology/Oncology:  Hematology Normal   Oncology Normal     EENT/Dental:EENT/Dental Normal   Cardiovascular:   Hypertension    Pulmonary:   COPD, mild    Renal/:  Renal/ Normal     Hepatic/GI:   GERD    Musculoskeletal:  Musculoskeletal Normal    Neurological:  Neurology Normal    Endocrine:  Endocrine Normal    Dermatological:  Skin Normal    Psych:   Psychiatric History anxiety depression          Physical Exam  General:  Well nourished    Airway/Jaw/Neck:  Airway Findings: Mouth Opening: Normal Tongue: Normal  General Airway Assessment: Adult  Mallampati: II  Improves to II with phonation.  TM Distance: Normal, at least 6 cm        Eyes/Ears/Nose:  EYES/EARS/NOSE FINDINGS: Normal   Dental:  Dental Findings: upper partial dentures   Chest/Lungs:  Chest/Lungs Findings: Clear to auscultation, Normal Respiratory Rate     Heart/Vascular:  Heart Findings: Rate: Normal  Rhythm: Regular Rhythm  Heart murmur: negative Vascular Findings: Normal    Abdomen:  Abdomen Findings: Normal    Musculoskeletal:  Musculoskeletal Findings: Normal   Skin:  Skin  Findings: Normal    Mental Status:  Mental Status Findings: Normal        Anesthesia Plan  Type of Anesthesia, risks & benefits discussed:  Anesthesia Type:  general  Patient's Preference:   Intra-op Monitoring Plan: standard ASA monitors  Intra-op Monitoring Plan Comments:   Post Op Pain Control Plan:   Post Op Pain Control Plan Comments:   Induction:   IV  Beta Blocker:  Patient is not currently on a Beta-Blocker (No further documentation required).       Informed Consent: Patient understands risks and agrees with Anesthesia plan.  Questions answered. Anesthesia consent signed with patient.  ASA Score: 2     Day of Surgery Review of History & Physical:    H&P update referred to the provider.         Ready For Surgery From Anesthesia Perspective.

## 2018-12-06 NOTE — DISCHARGE INSTRUCTIONS
Colonoscopy     A camera attached to a flexible tube with a viewing lens is used to take video pictures.     Colonoscopy is a test to view the inside of your lower digestive tract (colon and rectum). Sometimes it can show the last part of the small intestine (ileum). During the test, small pieces of tissue may be removed for testing. This is called a biopsy. Small growths, such as polyps, may also be removed.   Why is colonoscopy done?  The test is done to help look for colon cancer. And it can help find the source of abdominal pain, bleeding, and changes in bowel habits. It may be needed once a year, depending on factors such as your:  · Age  · Health history  · Family health history  · Symptoms  · Results from any prior colonoscopy  Risks and possible complications  These include:  · Bleeding               · A puncture or tear in the colon   · Risks of anesthesia  · A cancer lesion not being seen  Getting ready   To prepare for the test:  · Talk with your healthcare provider about the risks of the test (see below). Also ask your healthcare provider about alternatives to the test.  · Tell your healthcare provider about any medicines you take. Also tell him or her about any health conditions you may have.  · Make sure your rectum and colon are empty for the test. Follow the diet and bowel prep instructions exactly. If you dont, the test may need to be rescheduled.  · Plan for a friend or family member to drive you home after the test.     Colonoscopy provides an inside view of the entire colon.     You may discuss the results with your doctor right away or at a future visit.  During the test   The test is usually done in the hospital on an outpatient basis. This means you go home the same day. The procedure takes about 30 minutes. During that time:  · You are given relaxing (sedating) medicine through an IV line. You may be drowsy, or fully asleep.  · The healthcare provider will first give you a physical exam to  check for anal and rectal problems.  · Then the anus is lubricated and the scope inserted.  · If you are awake, you may have a feeling similar to needing to have a bowel movement. You may also feel pressure as air is pumped into the colon. Its OK to pass gas during the procedure.  · Biopsy, polyp removal, or other treatments may be done during the test.  After the test   You may have gas right after the test. It can help to try to pass it to help prevent later bloating. Your healthcare provider may discuss the results with you right away. Or you may need to schedule a follow-up visit to talk about the results. After the test, you can go back to your normal eating and other activities. You may be tired from the sedation and need to rest for a few hours.  Date Last Reviewed: 11/1/2016 © 2000-2017 [employer] [address] [place] [address], [place] [address]. All rights reserved. This information is not intended as a substitute for professional medical care. Always follow your healthcare professional's instructions.

## 2018-12-06 NOTE — TRANSFER OF CARE
"Anesthesia Transfer of Care Note    Patient: Wen Mares    Procedure(s) Performed: Procedure(s) (LRB):  COLONOSCOPY (N/A)    Patient location: PACU    Anesthesia Type: general    Transport from OR: Transported from OR on 6-10 L/min O2 by face mask with adequate spontaneous ventilation    Post pain: adequate analgesia    Post assessment: no apparent anesthetic complications and tolerated procedure well    Post vital signs: stable    Level of consciousness: awake    Nausea/Vomiting: no nausea/vomiting    Complications: none    Transfer of care protocol was followed      Last vitals:   Visit Vitals  BP (!) 121/58   Pulse (!) 16   Temp 36.4 °C (97.5 °F)   Resp 16   Ht 5' 2" (1.575 m)   Wt 66.7 kg (147 lb)   SpO2 100%   Breastfeeding? No   BMI 26.89 kg/m²     "

## 2018-12-06 NOTE — ANESTHESIA POSTPROCEDURE EVALUATION
"Anesthesia Post Evaluation    Patient: Wen Mares    Procedure(s) Performed: Procedure(s) (LRB):  COLONOSCOPY (N/A)    Final Anesthesia Type: general  Patient location during evaluation: GI PACU  Patient participation: Yes- Able to Participate  Level of consciousness: awake and alert  Post-procedure vital signs: reviewed and stable  Pain management: adequate  Airway patency: patent  PONV status at discharge: No PONV  Anesthetic complications: no      Cardiovascular status: blood pressure returned to baseline and hemodynamically stable  Respiratory status: spontaneous ventilation, unassisted and room air  Hydration status: euvolemic  Follow-up not needed.        Visit Vitals  BP (!) 148/75   Pulse 74   Temp 36.4 °C (97.5 °F)   Resp 18   Ht 5' 2" (1.575 m)   Wt 66.7 kg (147 lb)   SpO2 100%   Breastfeeding? No   BMI 26.89 kg/m²       Pain/Ayni Score: Pain Assessment Performed: Yes (12/6/2018  2:13 PM)  Presence of Pain: denies (12/6/2018  2:13 PM)  Pain Rating Prior to Med Admin: 9 (12/6/2018 12:35 PM)  Anyi Score: 10 (12/6/2018  2:13 PM)        "

## 2018-12-06 NOTE — [employer] PATIENT INSTRUCTIONS
Discharge Summary/Instructions after an Endoscopic Procedure  Patient Name: Wen Childs  Patient MRN: [id number]  Patient YOB: 1960 Thursday, December 06, 2018  Endy Moncada MD  RESTRICTIONS:  During your procedure today, you received medications for sedation.  These   medications may affect your judgment, balance and coordination.  Therefore,   for 24 hours, you have the following restrictions:   - DO NOT drive a car, operate machinery, make legal/financial decisions,   sign important papers or drink alcohol.    ACTIVITY:  Today: no heavy lifting, straining or running due to procedural   sedation/anesthesia.  The following day: return to full activity including work.  DIET:  Eat and drink normally unless instructed otherwise.     TREATMENT FOR COMMON SIDE EFFECTS:  - Mild abdominal pain, nausea, belching, bloating or excessive gas:  rest,   eat lightly and use a heating pad.  - Sore Throat: treat with throat lozenges and/or gargle with warm salt   water.  - Because air was used during the procedure, expelling large amounts of air   from your rectum or belching is normal.  - If a bowel prep was taken, you may not have a bowel movement for 1-3 days.    This is normal.  SYMPTOMS TO WATCH FOR AND REPORT TO YOUR PHYSICIAN:  1. Abdominal pain or bloating, other than gas cramps.  2. Chest pain.  3. Back pain.  4. Signs of infection such as: chills or fever occurring within 24 hours   after the procedure.  5. Rectal bleeding, which would show as bright red, maroon, or black stools.   (A tablespoon of blood from the rectum is not serious, especially if   hemorrhoids are present.)  6. Vomiting.  7. Weakness or dizziness.  GO DIRECTLY TO THE NEAREST EMERGENCY ROOM IF YOU HAVE ANY OF THE FOLLOWING:      Difficulty breathing              Chills and/or fever over 101 F   Persistent vomiting and/or vomiting blood   Severe abdominal pain   Severe chest pain   Black, tarry stools   Bleeding- more than one  tablespoon   Any other symptom or condition that you feel may need urgent attention  Your doctor recommends these additional instructions:  If any biopsies were taken, your doctors clinic will contact you in 1 to 2   weeks with any results.  - Patient has a contact number available for emergencies.  The signs and   symptoms of potential delayed complications were discussed with the   patient.  Return to normal activities tomorrow.  Written discharge   instructions were provided to the patient.   - Discharge patient to home.   - Resume previous diet.   - Continue present medications.   - Await pathology results.   - Repeat colonoscopy in 5 years for surveillance.   For questions, problems or results please call your physician - Endy Moncada MD at Work:  [phone].  [facility], EMERGENCY ROOM PHONE NUMBER: [phone]  IF A COMPLICATION OR EMERGENCY SITUATION ARISES AND YOU ARE UNABLE TO REACH   YOUR PHYSICIAN - GO DIRECTLY TO THE EMERGENCY ROOM.  Endy Moncada MD  12/6/2018 1:42:22 PM  This report has been verified and signed electronically.  [employer]

## 2018-12-06 NOTE — INTERVAL H&P NOTE
The patient has been examined and the H&P has been reviewed:    There is no interval changes since last encounter.    Colonoscopy: Change in bowel function    Sedation: GA  ASA: Per anesthesia  Mallampati: Per anesthesia    Endoscopy risks, benefits and alternative options discussed and understood by patient/family.          Active Hospital Problems    Diagnosis  POA    Change in bowel habits [R19.4]  Yes      Resolved Hospital Problems   No resolved problems to display.

## 2018-12-13 ENCOUNTER — TELEPHONE (OUTPATIENT)
Dept: ENDOSCOPY | Facility: HOSPITAL | Age: 58
End: 2018-12-13

## 2018-12-17 ENCOUNTER — TELEPHONE (OUTPATIENT)
Dept: GASTROENTEROLOGY | Facility: CLINIC | Age: 58
End: 2018-12-17

## 2018-12-17 NOTE — TELEPHONE ENCOUNTER
----- Message from Endy Moncada MD sent at 12/16/2018 12:16 PM CST -----  Please notify patient, the colon polyp was benign.

## 2019-01-16 ENCOUNTER — TELEPHONE (OUTPATIENT)
Dept: GASTROENTEROLOGY | Facility: CLINIC | Age: 59
End: 2019-01-16

## 2019-01-16 NOTE — TELEPHONE ENCOUNTER
----- Message from Bertha Bishop sent at 1/16/2019  2:19 PM CST -----  Contact: Self- [phone]  Antwan- pt called to speak with staff regarding appt scheduled today 1/16 at 4pm- states she's not feeling feel and trying to determine if it's a must- please contact pt at [phone]

## 2019-01-16 NOTE — TELEPHONE ENCOUNTER
Ma spoke with pt pt states she is not feeling well, patient states she has not done karen recommendations from 11/14 and wants to cxl her appt , ma explain to pt that was ok   Pt verbalized understanding

## 2019-02-14 RX ORDER — OXYBUTYNIN CHLORIDE 10 MG/1
TABLET, EXTENDED RELEASE ORAL
Qty: 30 TABLET | Refills: 0 | OUTPATIENT
Start: 2019-02-14

## 2019-02-21 ENCOUNTER — OFFICE VISIT (OUTPATIENT)
Dept: URGENT CARE | Facility: CLINIC | Age: 59
End: 2019-02-21
Payer: MEDICARE

## 2019-02-21 VITALS
HEIGHT: 62 IN | HEART RATE: 81 BPM | OXYGEN SATURATION: 99 % | TEMPERATURE: 99 F | BODY MASS INDEX: 27.05 KG/M2 | WEIGHT: 147 LBS | SYSTOLIC BLOOD PRESSURE: 131 MMHG | DIASTOLIC BLOOD PRESSURE: 87 MMHG | RESPIRATION RATE: 16 BRPM

## 2019-02-21 DIAGNOSIS — M25.561 ARTHRALGIA OF RIGHT KNEE: Primary | ICD-10-CM

## 2019-02-21 PROCEDURE — 96372 THER/PROPH/DIAG INJ SC/IM: CPT | Mod: S$GLB,,, | Performed by: SURGERY

## 2019-02-21 PROCEDURE — 99214 PR OFFICE/OUTPT VISIT, EST, LEVL IV, 30-39 MIN: ICD-10-PCS | Mod: S$GLB,,, | Performed by: SURGERY

## 2019-02-21 PROCEDURE — 99214 OFFICE O/P EST MOD 30 MIN: CPT | Mod: S$GLB,,, | Performed by: SURGERY

## 2019-02-21 PROCEDURE — 96372 PR INJECTION,THERAP/PROPH/DIAG2ST, IM OR SUBCUT: ICD-10-PCS | Mod: S$GLB,,, | Performed by: SURGERY

## 2019-02-21 RX ORDER — METHYLPREDNISOLONE 4 MG/1
TABLET ORAL
Qty: 1 PACKAGE | Refills: 0 | Status: SHIPPED | OUTPATIENT
Start: 2019-02-21 | End: 2021-12-08 | Stop reason: ALTCHOICE

## 2019-02-21 RX ORDER — DICLOFENAC SODIUM 75 MG/1
75 TABLET, DELAYED RELEASE ORAL 2 TIMES DAILY PRN
Qty: 60 TABLET | Refills: 0 | Status: SHIPPED | OUTPATIENT
Start: 2019-02-21 | End: 2019-03-23

## 2019-02-21 RX ORDER — OXYBUTYNIN CHLORIDE 10 MG/1
10 TABLET, EXTENDED RELEASE ORAL DAILY
Refills: 11 | COMMUNITY
Start: 2019-01-05

## 2019-02-21 RX ORDER — KETOROLAC TROMETHAMINE 30 MG/ML
30 INJECTION, SOLUTION INTRAMUSCULAR; INTRAVENOUS
Status: COMPLETED | OUTPATIENT
Start: 2019-02-21 | End: 2019-02-21

## 2019-02-21 RX ORDER — AMPICILLIN TRIHYDRATE 500 MG
CAPSULE ORAL DAILY
Refills: 2 | COMMUNITY
Start: 2019-01-05

## 2019-02-21 RX ADMIN — KETOROLAC TROMETHAMINE 30 MG: 30 INJECTION, SOLUTION INTRAMUSCULAR; INTRAVENOUS at 02:02

## 2019-02-21 NOTE — PATIENT INSTRUCTIONS
Knee Pain of Uncertain Cause    There are several common causes for knee pain. These can include:  · A sprain of the ligaments that support the joint  · An injury to the cartilage lining of the joint  · Arthritis from wear-and-tear or inflammation  There are other causes as well. There may also be swelling, reduced movement of the knee joint, and pain with walking. A definite diagnosis will still need to be made. If your symptoms do not improve, further follow-up and testing may be needed.  Home care  · Stay off the injured leg as much as possible until pain improves.  · Apply an ice pack over the injured area for 15 to 20 minutes every 3 to 6 hours. You should do this for the first 24 to 48 hours. You can make an ice pack by filling a plastic bag that seals at the top with ice cubes and then wrapping it with a thin towel. Continue to use ice packs for relief of pain and swelling as needed. As the ice melts, be careful to avoid getting your wrap, splint, or cast wet. After 48 hours, apply heat (warm shower or warm bath) for 15 to 20 minutes several times a day, or alternate ice and heat. If you have to wear a hook-and-loop knee brace, you can open it to apply the ice pack, or heat, directly to the knee. Never put ice directly on the skin. Always wrap the ice in a towel or other type of cloth.  · You may use over-the-counter pain medicine to control pain, unless another pain medicine was prescribed. If you have chronic liver or kidney disease or ever had a stomach ulcer or GI bleeding, talk with your healthcare provider using these medicines.  · If crutches or a walker have been recommended, do not put weight on the injured leg until you can do so without pain. Check with your healthcare provider before returning to sports or full work duties.  · If you have a hook-and-loop knee brace, you can remove it to bathe and sleep, unless told otherwise.  Follow-up care  Follow up with your healthcare provider as advised.  This is usually within 1-2 weeks.  If X-rays were taken, you will be told of any new findings that may affect your care.  Call 911  Call 911 if you have:  · Shortness of breath  · Chest pain  When to seek medical advice  Call your healthcare provider right away if any of these occur:  · Toes or foot becomes swollen, cold, blue, numb, or tingly  · Pain or swelling spreads over the knee or calf  · Warmth or redness appears over the knee or calf  · Other joints become painful  · Rash appears  · Fever of 100.4°F (38°C) or above lasting for 24 to 48 hours  Date Last Reviewed: 11/23/2015  © 9572-4957 [employer]. [address] [place] [address], [place] [address]. All rights reserved. This information is not intended as a substitute for professional medical care. Always follow your healthcare professional's instructions.

## 2019-02-21 NOTE — PROGRESS NOTES
"Subjective:       Patient ID: Wen Mares is a 58 y.o. female.    Vitals:    02/21/19 1343   BP: 131/87   Pulse: 81   Resp: 16   Temp: 98.6 °F (37 °C)   SpO2: 99%   Weight: 66.7 kg (147 lb)   Height: 5' 2" (1.575 m)       Chief Complaint: Knee Pain    Pt states right knee pain x 3 months. Pt denies injury. Pt states a hx of sciatica.      Knee Pain    The incident occurred more than 1 week ago. There was no injury mechanism. The pain is present in the right knee. The pain is at a severity of 9/10. The pain has been constant since onset. She reports no foreign bodies present. Nothing aggravates the symptoms. Treatments tried: norco. The treatment provided moderate relief.     Review of Systems   Constitution: Negative for chills and fever.   HENT: Negative for sore throat.    Eyes: Negative for blurred vision.   Cardiovascular: Negative for chest pain.   Respiratory: Negative for shortness of breath.    Skin: Negative for rash.   Musculoskeletal: Positive for joint pain. Negative for back pain.   Gastrointestinal: Negative for abdominal pain, diarrhea, nausea and vomiting.   Neurological: Negative for headaches.   Psychiatric/Behavioral: The patient is not nervous/anxious.        Objective:      Physical Exam   Constitutional: She is oriented to person, place, and time. She appears well-developed and well-nourished. She is cooperative.  Non-toxic appearance. She does not appear ill. No distress.   HENT:   Head: Normocephalic and atraumatic.   Right Ear: Hearing, tympanic membrane, external ear and ear canal normal.   Left Ear: Hearing, tympanic membrane, external ear and ear canal normal.   Nose: Nose normal. No mucosal edema, rhinorrhea or nasal deformity. No epistaxis. Right sinus exhibits no maxillary sinus tenderness and no frontal sinus tenderness. Left sinus exhibits no maxillary sinus tenderness and no frontal sinus tenderness.   Mouth/Throat: Uvula is midline, oropharynx is clear and moist and mucous " membranes are normal. No trismus in the jaw. Normal dentition. No uvula swelling. No posterior oropharyngeal erythema.   Eyes: Conjunctivae and lids are normal. Right eye exhibits no discharge. Left eye exhibits no discharge. No scleral icterus.   Sclera clear bilat   Neck: Trachea normal, normal range of motion, full passive range of motion without pain and phonation normal. Neck supple.   Cardiovascular: Normal rate, regular rhythm, normal heart sounds, intact distal pulses and normal pulses.   Pulmonary/Chest: Effort normal and breath sounds normal. No respiratory distress.   Abdominal: Soft. Normal appearance and bowel sounds are normal. She exhibits no distension, no pulsatile midline mass and no mass. There is no tenderness.   Musculoskeletal: She exhibits no edema or deformity.        Right knee: She exhibits decreased range of motion. She exhibits no swelling, no effusion, no deformity and no erythema. No tenderness found. No medial joint line and no lateral joint line tenderness noted.        Legs:  Neurological: She is alert and oriented to person, place, and time. She exhibits normal muscle tone. Coordination normal.   Skin: Skin is warm, dry and intact. She is not diaphoretic. No pallor.   Psychiatric: She has a normal mood and affect. Her speech is normal and behavior is normal. Judgment and thought content normal. Cognition and memory are normal.   Nursing note and vitals reviewed.      Assessment:       1. Arthralgia of right knee        Plan:       Wen BAÑUELSO was seen today for knee pain.    Diagnoses and all orders for this visit:    Arthralgia of right knee  -     Ambulatory referral to Orthopedics  -     methylPREDNISolone (MEDROL DOSEPACK) 4 mg tablet; use as directed  -     diclofenac (VOLTAREN) 75 MG EC tablet; Take 1 tablet (75 mg total) by mouth 2 (two) times daily as needed. Start after medrol pack is finished.  -     ketorolac injection 30 mg         checked and hydrocodone given in  October, nov, dec of 2018     Patient Instructions     Knee Pain of Uncertain Cause    There are several common causes for knee pain. These can include:  · A sprain of the ligaments that support the joint  · An injury to the cartilage lining of the joint  · Arthritis from wear-and-tear or inflammation  There are other causes as well. There may also be swelling, reduced movement of the knee joint, and pain with walking. A definite diagnosis will still need to be made. If your symptoms do not improve, further follow-up and testing may be needed.  Home care  · Stay off the injured leg as much as possible until pain improves.  · Apply an ice pack over the injured area for 15 to 20 minutes every 3 to 6 hours. You should do this for the first 24 to 48 hours. You can make an ice pack by filling a plastic bag that seals at the top with ice cubes and then wrapping it with a thin towel. Continue to use ice packs for relief of pain and swelling as needed. As the ice melts, be careful to avoid getting your wrap, splint, or cast wet. After 48 hours, apply heat (warm shower or warm bath) for 15 to 20 minutes several times a day, or alternate ice and heat. If you have to wear a hook-and-loop knee brace, you can open it to apply the ice pack, or heat, directly to the knee. Never put ice directly on the skin. Always wrap the ice in a towel or other type of cloth.  · You may use over-the-counter pain medicine to control pain, unless another pain medicine was prescribed. If you have chronic liver or kidney disease or ever had a stomach ulcer or GI bleeding, talk with your healthcare provider using these medicines.  · If crutches or a walker have been recommended, do not put weight on the injured leg until you can do so without pain. Check with your healthcare provider before returning to sports or full work duties.  · If you have a hook-and-loop knee brace, you can remove it to bathe and sleep, unless told otherwise.  Follow-up  care  Follow up with your healthcare provider as advised. This is usually within 1-2 weeks.  If X-rays were taken, you will be told of any new findings that may affect your care.  Call 911  Call 911 if you have:  · Shortness of breath  · Chest pain  When to seek medical advice  Call your healthcare provider right away if any of these occur:  · Toes or foot becomes swollen, cold, blue, numb, or tingly  · Pain or swelling spreads over the knee or calf  · Warmth or redness appears over the knee or calf  · Other joints become painful  · Rash appears  · Fever of 100.4°F (38°C) or above lasting for 24 to 48 hours  Date Last Reviewed: 11/23/2015  © 5395-4250 [employer]. [address] [place] [address], [place] [address]. All rights reserved. This information is not intended as a substitute for professional medical care. Always follow your healthcare professional's instructions.

## 2019-02-25 ENCOUNTER — OFFICE VISIT (OUTPATIENT)
Dept: ORTHOPEDICS | Facility: CLINIC | Age: 59
End: 2019-02-25
Payer: MEDICARE

## 2019-02-25 ENCOUNTER — HOSPITAL ENCOUNTER (OUTPATIENT)
Dept: RADIOLOGY | Facility: HOSPITAL | Age: 59
Discharge: HOME | End: 2019-02-25
Attending: ORTHOPAEDIC SURGERY
Payer: MEDICARE

## 2019-02-25 VITALS — HEIGHT: 62 IN | WEIGHT: 142.75 LBS | BODY MASS INDEX: 26.27 KG/M2

## 2019-02-25 DIAGNOSIS — M79.661 PAIN OF RIGHT LOWER LEG: ICD-10-CM

## 2019-02-25 DIAGNOSIS — M17.11 PRIMARY OSTEOARTHRITIS OF RIGHT KNEE: Primary | ICD-10-CM

## 2019-02-25 DIAGNOSIS — M25.561 RIGHT KNEE PAIN, UNSPECIFIED CHRONICITY: ICD-10-CM

## 2019-02-25 PROCEDURE — 73562 XR KNEE ORTHO RIGHT WITH FLEXION: ICD-10-PCS | Mod: 26,59,RT, | Performed by: RADIOLOGY

## 2019-02-25 PROCEDURE — 73564 XR KNEE ORTHO RIGHT WITH FLEXION: ICD-10-PCS | Mod: 26,RT,, | Performed by: RADIOLOGY

## 2019-02-25 PROCEDURE — 99999 PR PBB SHADOW E&M-EST. PATIENT-LVL III: ICD-10-PCS | Mod: PBBFAC,,, | Performed by: ORTHOPAEDIC SURGERY

## 2019-02-25 PROCEDURE — 20610 DRAIN/INJ JOINT/BURSA W/O US: CPT | Mod: PBBFAC | Performed by: ORTHOPAEDIC SURGERY

## 2019-02-25 PROCEDURE — 99213 OFFICE O/P EST LOW 20 MIN: CPT | Mod: PBBFAC,25 | Performed by: ORTHOPAEDIC SURGERY

## 2019-02-25 PROCEDURE — 73564 X-RAY EXAM KNEE 4 OR MORE: CPT | Mod: 26,RT,, | Performed by: RADIOLOGY

## 2019-02-25 PROCEDURE — 73562 X-RAY EXAM OF KNEE 3: CPT | Mod: 26,59,RT, | Performed by: RADIOLOGY

## 2019-02-25 PROCEDURE — 73562 X-RAY EXAM OF KNEE 3: CPT | Mod: TC,RT

## 2019-02-25 PROCEDURE — 99203 OFFICE O/P NEW LOW 30 MIN: CPT | Mod: 25,S$PBB,, | Performed by: ORTHOPAEDIC SURGERY

## 2019-02-25 PROCEDURE — 20610 LARGE JOINT ASPIRATION/INJECTION: R KNEE: ICD-10-PCS | Mod: S$PBB,RT,, | Performed by: ORTHOPAEDIC SURGERY

## 2019-02-25 PROCEDURE — 99999 PR PBB SHADOW E&M-EST. PATIENT-LVL III: CPT | Mod: PBBFAC,,, | Performed by: ORTHOPAEDIC SURGERY

## 2019-02-25 PROCEDURE — 99203 PR OFFICE/OUTPT VISIT, NEW, LEVL III, 30-44 MIN: ICD-10-PCS | Mod: 25,S$PBB,, | Performed by: ORTHOPAEDIC SURGERY

## 2019-02-25 RX ORDER — OMEPRAZOLE 40 MG/1
40 CAPSULE, DELAYED RELEASE ORAL
COMMUNITY

## 2019-02-25 RX ORDER — SUCRALFATE 1 G/1
1 TABLET ORAL
COMMUNITY
Start: 2018-04-22

## 2019-02-25 RX ORDER — TRIAMCINOLONE ACETONIDE 40 MG/ML
40 INJECTION, SUSPENSION INTRA-ARTICULAR; INTRAMUSCULAR
Status: DISCONTINUED | OUTPATIENT
Start: 2019-02-25 | End: 2019-02-25 | Stop reason: HOSPADM

## 2019-02-25 RX ORDER — ACYCLOVIR 400 MG/1
TABLET ORAL
COMMUNITY

## 2019-02-25 RX ORDER — FERROUS SULFATE 325(65) MG
325 TABLET, DELAYED RELEASE (ENTERIC COATED) ORAL
COMMUNITY

## 2019-02-25 RX ORDER — ALBUTEROL SULFATE 90 UG/1
2 AEROSOL, METERED RESPIRATORY (INHALATION)
COMMUNITY
Start: 2018-03-13

## 2019-02-25 RX ORDER — HYDROCHLOROTHIAZIDE 25 MG/1
25 TABLET ORAL
COMMUNITY

## 2019-02-25 RX ORDER — POTASSIUM CHLORIDE 20 MEQ/1
TABLET, EXTENDED RELEASE ORAL
COMMUNITY
Start: 2018-04-16

## 2019-02-25 RX ORDER — CYPROHEPTADINE HYDROCHLORIDE 4 MG/1
4 TABLET ORAL
COMMUNITY

## 2019-02-25 RX ORDER — ASPIRIN 81 MG/1
81 TABLET ORAL
COMMUNITY

## 2019-02-25 RX ADMIN — TRIAMCINOLONE ACETONIDE 40 MG: 40 INJECTION, SUSPENSION INTRA-ARTICULAR; INTRAMUSCULAR at 03:02
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
## 2019-02-25 NOTE — LETTER
February 25, 2019      Nuris Aiken MD  [address] [address]  [address]  [facility] [address]           [facility]  [address], [address]  [facility] [address]  Phone: [phone]          Patient: Wen Mares   MR Number: [id number]   YOB: 1960   Date of Visit: 2/25/2019       Dear Dr. Nuris Aiken:    Thank you for referring Wen Mares to me for evaluation. Attached you will find relevant portions of my assessment and plan of care.    If you have questions, please do not hesitate to call me. I look forward to following Wen Mares along with you.    Sincerely,    Pratik Gresham III, MD    Enclosure  CC:  No Recipients    If you would like to receive this communication electronically, please contact [email or web address] or [phone] to request more information on [employer] access.    For providers and/or their staff who would like to refer a patient to [facility], please contact us through our one-stop-shop provider referral line, [facility], at [phone].    If you feel you have received this communication in error or would no longer like to receive these types of communications, please e-mail [email or web address]

## 2019-02-25 NOTE — PROCEDURES
Large Joint Aspiration/Injection: R knee  Date/Time: 2/25/2019 3:42 PM  Performed by: Pratik Gresham III, MD  Authorized by: Pratik Gresham III, MD     Consent Done?:  Yes (Verbal)  Indications:  Pain  Timeout: Prior to procedure the correct patient, procedure, and site was verified      Location:  Knee  Site:  R knee  Prep: Patient was prepped and draped in usual sterile fashion    Needle size:  21 G  Medications:  40 mg triamcinolone acetonide 40 mg/mL  Patient tolerance:  Patient tolerated the procedure well with no immediate complications

## 2019-02-26 ENCOUNTER — HOSPITAL ENCOUNTER (OUTPATIENT)
Dept: RADIOLOGY | Facility: HOSPITAL | Age: 59
Discharge: HOME | End: 2019-02-26
Attending: ORTHOPAEDIC SURGERY
Payer: MEDICARE

## 2019-02-26 ENCOUNTER — TELEPHONE (OUTPATIENT)
Dept: ORTHOPEDICS | Facility: CLINIC | Age: 59
End: 2019-02-26

## 2019-02-26 DIAGNOSIS — M17.11 PRIMARY OSTEOARTHRITIS OF RIGHT KNEE: ICD-10-CM

## 2019-02-26 DIAGNOSIS — M79.661 PAIN OF RIGHT LOWER LEG: ICD-10-CM

## 2019-02-26 DIAGNOSIS — M25.561 RIGHT KNEE PAIN, UNSPECIFIED CHRONICITY: ICD-10-CM

## 2019-02-26 PROCEDURE — 93971 US LOWER EXTREMITY VEINS RIGHT: ICD-10-PCS | Mod: 26,RT,, | Performed by: RADIOLOGY

## 2019-02-26 PROCEDURE — 93971 EXTREMITY STUDY: CPT | Mod: TC,RT

## 2019-02-26 PROCEDURE — 93971 EXTREMITY STUDY: CPT | Mod: 26,RT,, | Performed by: RADIOLOGY

## 2019-02-26 NOTE — TELEPHONE ENCOUNTER
----- Message from Yen Jackson sent at 2/26/2019  3:48 PM CST -----  Contact: self  Reason for call: Previous Injection         Communication Preference: Phone     Additional Information: Pt ask for Dr Gresham to call in regards to the injection she received on 2/25

## 2019-02-26 NOTE — PROGRESS NOTES
I spoke with pt informing her that her vomiting and headaches aren't side affects of the injection she had on 2/25. I also informed her that Dr. Gresham advised her to take headache medicine and maybe try drinking sprite for nausea. If symptoms continue in the next 2 days, she should go to Urgent Care. I also, gave pt her ultrasound results. Pt gave verbal understanding.

## 2019-06-28 ENCOUNTER — OFFICE VISIT (OUTPATIENT)
Dept: URGENT CARE | Facility: CLINIC | Age: 59
End: 2019-06-28
Payer: MEDICARE

## 2019-06-28 VITALS
SYSTOLIC BLOOD PRESSURE: 129 MMHG | TEMPERATURE: 99 F | OXYGEN SATURATION: 98 % | DIASTOLIC BLOOD PRESSURE: 83 MMHG | HEART RATE: 82 BPM | RESPIRATION RATE: 20 BRPM | WEIGHT: 142 LBS | BODY MASS INDEX: 26.13 KG/M2 | HEIGHT: 62 IN

## 2019-06-28 DIAGNOSIS — J34.89 PAIN OF NOSE: ICD-10-CM

## 2019-06-28 DIAGNOSIS — J34.89 INFECTION OF NOSE: Primary | ICD-10-CM

## 2019-06-28 PROCEDURE — 99214 OFFICE O/P EST MOD 30 MIN: CPT | Mod: S$GLB,,, | Performed by: PHYSICIAN ASSISTANT

## 2019-06-28 PROCEDURE — 99214 PR OFFICE/OUTPT VISIT, EST, LEVL IV, 30-39 MIN: ICD-10-PCS | Mod: S$GLB,,, | Performed by: PHYSICIAN ASSISTANT

## 2019-06-28 RX ORDER — AMOXICILLIN 875 MG/1
875 TABLET, FILM COATED ORAL 2 TIMES DAILY
Qty: 14 TABLET | Refills: 0 | Status: SHIPPED | OUTPATIENT
Start: 2019-06-28 | End: 2019-07-05

## 2019-06-28 RX ORDER — MUPIROCIN 20 MG/G
OINTMENT TOPICAL
Qty: 22 G | Refills: 0 | Status: SHIPPED | OUTPATIENT
Start: 2019-06-28

## 2019-06-28 RX ORDER — AMOXICILLIN 875 MG/1
875 TABLET, FILM COATED ORAL 2 TIMES DAILY
Qty: 14 TABLET | Refills: 0 | Status: SHIPPED | OUTPATIENT
Start: 2019-06-28 | End: 2019-06-28 | Stop reason: SDUPTHER

## 2019-06-28 NOTE — PATIENT INSTRUCTIONS
- Rest.    - Drink plenty of fluids.    - Tylenol or Ibuprofen as directed as needed for fever/pain. Avoid tylenol if you have a history of liver disease. Do not take ibuprofen if you have a history of GI bleeding, kidney disease, or if you take blood thinners.     - Cool compresses or ice to help with inflammation    - You have been given an antibiotic to treat your condition today.    - Please complete the antibiotic as directed on the bottle.   - If you are female and on oral birth control pills, use additional methods to prevent pregnancy while on antibiotics and for one cycle after.   - you can take over-the-counter probiotics during and after antibiotic use to help preserve gut joseluis and reduce gastrointestinal symptoms    - Bactroban- you can use a cotton swab to apply to the inside of your nostril three times daily.    - Follow up with your PCP or specialty clinic as directed in the next 2-3 days if not improved or as needed.  You can call [phone] to schedule an appointment with the appropriate provider.    - Go to the ER or seek medical attention immediately if you develop new or worsening symptoms.    - You must understand that you have received an Urgent Care treatment only and that you may be released before all of your medical problems are known or treated.   - You, the patient, will arrange for follow up care as instructed.   - If your condition worsens or fails to improve we recommend that you receive another evaluation at the ER immediately or contact your PCP to discuss your concerns or return here.

## 2019-06-28 NOTE — PROGRESS NOTES
"Subjective:       Patient ID: Wen Mares is a 58 y.o. female.    Vitals:  height is 5' 2" (1.575 m) and weight is 64.4 kg (142 lb). Her temperature is 98.7 °F (37.1 °C). Her blood pressure is 129/83 and her pulse is 82. Her respiration is 20 and oxygen saturation is 98%.     Chief Complaint: No chief complaint on file.    Pt states yesterday the right side of her nose began hurting yesterday, its tender to touch. She spoke to her pcp and was told she may have infection and to come to urgent care. Patient denies trauma or injury. She did not put any foreign bodies in the nose and was not picking at it. She denies epistaxis. Has never had this before.      Pain   This is a new problem. The current episode started yesterday. The problem occurs constantly. The problem has been gradually worsening. Pertinent negatives include no arthralgias, chest pain, chills, congestion, coughing, fatigue, fever, headaches, joint swelling, myalgias, nausea, rash, sore throat, vertigo, vomiting or weakness. Nothing aggravates the symptoms. She has tried nothing for the symptoms.       Constitution: Negative for chills, fatigue and fever.   HENT: Negative for congestion, nosebleeds, foreign body in nose, postnasal drip, sinus pain, sinus pressure and sore throat.    Neck: Negative for painful lymph nodes.   Cardiovascular: Negative for chest pain and leg swelling.   Eyes: Negative for double vision and blurred vision.   Respiratory: Negative for cough and shortness of breath.    Gastrointestinal: Negative for nausea, vomiting and diarrhea.   Genitourinary: Negative for dysuria, frequency, urgency and history of kidney stones.   Musculoskeletal: Positive for pain. Negative for trauma, joint pain, joint swelling, muscle cramps and muscle ache.   Skin: Negative for color change, pale, rash and bruising.   Allergic/Immunologic: Negative for seasonal allergies.   Neurological: Negative for dizziness, history of vertigo, " light-headedness, passing out and headaches.   Hematologic/Lymphatic: Negative for swollen lymph nodes.   Psychiatric/Behavioral: Negative for nervous/anxious, sleep disturbance and depression. The patient is not nervous/anxious.        Objective:      Physical Exam   Constitutional: She is oriented to person, place, and time. She appears well-developed and well-nourished. She is cooperative.  Non-toxic appearance. She does not appear ill. No distress.   HENT:   Head: Normocephalic and atraumatic.   Right Ear: Hearing, tympanic membrane, external ear and ear canal normal.   Left Ear: Hearing, tympanic membrane, external ear and ear canal normal.   Nose: Sinus tenderness present. No mucosal edema, rhinorrhea, nose lacerations, nasal deformity, septal deviation or nasal septal hematoma. No epistaxis.  No foreign bodies. Right sinus exhibits no maxillary sinus tenderness and no frontal sinus tenderness. Left sinus exhibits no maxillary sinus tenderness and no frontal sinus tenderness.       Mouth/Throat: Uvula is midline, oropharynx is clear and moist and mucous membranes are normal. No trismus in the jaw. Normal dentition. No uvula swelling. No posterior oropharyngeal erythema.   Eyes: Conjunctivae and lids are normal. No scleral icterus.   Sclera clear bilat   Neck: Trachea normal, full passive range of motion without pain and phonation normal. Neck supple.   Cardiovascular: Normal rate, regular rhythm, normal heart sounds, intact distal pulses and normal pulses.   Pulmonary/Chest: Effort normal and breath sounds normal. No respiratory distress.   Abdominal: Soft. Normal appearance and bowel sounds are normal. She exhibits no distension. There is no tenderness.   Musculoskeletal: Normal range of motion. She exhibits no edema or deformity.   Neurological: She is alert and oriented to person, place, and time. She exhibits normal muscle tone. Coordination normal.   Skin: Skin is warm, dry and intact. She is not  diaphoretic. No pallor.   Psychiatric: She has a normal mood and affect. Her speech is normal and behavior is normal. Judgment and thought content normal. Cognition and memory are normal.   Nursing note and vitals reviewed.        Could be allergic in nature, also considered early presentation of closed comedone, however, because of location of swelling and pain being on/in the nose, will begin antibiotics and instructed close follow up.       Assessment:       1. Infection of nose    2. Pain of nose        Plan:         Infection of nose  -     amoxicillin (AMOXIL) 875 MG tablet; Take 1 tablet (875 mg total) by mouth 2 (two) times daily. for 7 days  Dispense: 14 tablet; Refill: 0  -     mupirocin (BACTROBAN) 2 % ointment; Apply to affected area 3 times daily  Dispense: 22 g; Refill: 0    Pain of nose      Patient Instructions   - Rest.    - Drink plenty of fluids.    - Tylenol or Ibuprofen as directed as needed for fever/pain. Avoid tylenol if you have a history of liver disease. Do not take ibuprofen if you have a history of GI bleeding, kidney disease, or if you take blood thinners.     - Cool compresses or ice to help with inflammation    - You have been given an antibiotic to treat your condition today.    - Please complete the antibiotic as directed on the bottle.   - If you are female and on oral birth control pills, use additional methods to prevent pregnancy while on antibiotics and for one cycle after.   - you can take over-the-counter probiotics during and after antibiotic use to help preserve gut joseluis and reduce gastrointestinal symptoms    - Bactroban- you can use a cotton swab to apply to the inside of your nostril three times daily.    - Follow up with your PCP or specialty clinic as directed in the next 2-3 days if not improved or as needed.  You can call [phone] to schedule an appointment with the appropriate provider.    - Go to the ER or seek medical attention immediately if you develop  new or worsening symptoms.    - You must understand that you have received an Urgent Care treatment only and that you may be released before all of your medical problems are known or treated.   - You, the patient, will arrange for follow up care as instructed.   - If your condition worsens or fails to improve we recommend that you receive another evaluation at the ER immediately or contact your PCP to discuss your concerns or return here.

## 2019-11-21 ENCOUNTER — TELEPHONE (OUTPATIENT)
Dept: SURGERY | Facility: CLINIC | Age: 59
End: 2019-11-21

## 2019-11-21 NOTE — TELEPHONE ENCOUNTER
Contacted the patient regarding breast surgery referral.  The patient is scheduled to be seen on Monday 12/16/19 2 pm with Ericka Anders PA-C.  The patient voiced understanding of appointment date, time, and location.  Reminder letter mailed to the patient.

## 2019-12-16 ENCOUNTER — LAB VISIT (OUTPATIENT)
Dept: LAB | Facility: HOSPITAL | Age: 59
End: 2019-12-16
Attending: NURSE PRACTITIONER
Payer: MEDICARE

## 2019-12-16 ENCOUNTER — OFFICE VISIT (OUTPATIENT)
Dept: SURGERY | Facility: CLINIC | Age: 59
End: 2019-12-16
Payer: MEDICARE

## 2019-12-16 ENCOUNTER — OFFICE VISIT (OUTPATIENT)
Dept: GASTROENTEROLOGY | Facility: CLINIC | Age: 59
End: 2019-12-16
Payer: MEDICARE

## 2019-12-16 VITALS
HEART RATE: 63 BPM | BODY MASS INDEX: 25.03 KG/M2 | WEIGHT: 136 LBS | DIASTOLIC BLOOD PRESSURE: 71 MMHG | HEIGHT: 62 IN | TEMPERATURE: 98 F | SYSTOLIC BLOOD PRESSURE: 144 MMHG

## 2019-12-16 VITALS
BODY MASS INDEX: 26.82 KG/M2 | DIASTOLIC BLOOD PRESSURE: 76 MMHG | HEART RATE: 73 BPM | SYSTOLIC BLOOD PRESSURE: 121 MMHG | HEIGHT: 62 IN | WEIGHT: 145.75 LBS

## 2019-12-16 DIAGNOSIS — F41.9 ANXIETY AND DEPRESSION: ICD-10-CM

## 2019-12-16 DIAGNOSIS — R10.10 UPPER ABDOMINAL PAIN: ICD-10-CM

## 2019-12-16 DIAGNOSIS — F41.9 ANXIETY: ICD-10-CM

## 2019-12-16 DIAGNOSIS — F32.A ANXIETY AND DEPRESSION: ICD-10-CM

## 2019-12-16 DIAGNOSIS — F32.A DEPRESSION, UNSPECIFIED DEPRESSION TYPE: Primary | ICD-10-CM

## 2019-12-16 DIAGNOSIS — K64.9 HEMORRHOIDS, UNSPECIFIED HEMORRHOID TYPE: ICD-10-CM

## 2019-12-16 DIAGNOSIS — R10.10 UPPER ABDOMINAL PAIN: Primary | ICD-10-CM

## 2019-12-16 PROCEDURE — 99214 PR OFFICE/OUTPT VISIT, EST, LEVL IV, 30-39 MIN: ICD-10-PCS | Mod: S$GLB,,, | Performed by: NURSE PRACTITIONER

## 2019-12-16 PROCEDURE — 3074F SYST BP LT 130 MM HG: CPT | Mod: CPTII,S$GLB,, | Performed by: NURSE PRACTITIONER

## 2019-12-16 PROCEDURE — 3074F PR MOST RECENT SYSTOLIC BLOOD PRESSURE < 130 MM HG: ICD-10-PCS | Mod: CPTII,S$GLB,, | Performed by: NURSE PRACTITIONER

## 2019-12-16 PROCEDURE — 3074F PR MOST RECENT SYSTOLIC BLOOD PRESSURE < 130 MM HG: ICD-10-PCS | Mod: CPTII,S$GLB,, | Performed by: PHYSICIAN ASSISTANT

## 2019-12-16 PROCEDURE — 86677 HELICOBACTER PYLORI ANTIBODY: CPT

## 2019-12-16 PROCEDURE — 99999 PR PBB SHADOW E&M-EST. PATIENT-LVL IV: CPT | Mod: PBBFAC,,, | Performed by: NURSE PRACTITIONER

## 2019-12-16 PROCEDURE — 3008F PR BODY MASS INDEX (BMI) DOCUMENTED: ICD-10-PCS | Mod: CPTII,S$GLB,, | Performed by: PHYSICIAN ASSISTANT

## 2019-12-16 PROCEDURE — 36415 COLL VENOUS BLD VENIPUNCTURE: CPT

## 2019-12-16 PROCEDURE — 99999 PR PBB SHADOW E&M-EST. PATIENT-LVL III: ICD-10-PCS | Mod: PBBFAC,,, | Performed by: PHYSICIAN ASSISTANT

## 2019-12-16 PROCEDURE — 3008F BODY MASS INDEX DOCD: CPT | Mod: CPTII,S$GLB,, | Performed by: NURSE PRACTITIONER

## 2019-12-16 PROCEDURE — 99214 OFFICE O/P EST MOD 30 MIN: CPT | Mod: S$GLB,,, | Performed by: NURSE PRACTITIONER

## 2019-12-16 PROCEDURE — 99999 PR PBB SHADOW E&M-EST. PATIENT-LVL III: CPT | Mod: PBBFAC,,, | Performed by: PHYSICIAN ASSISTANT

## 2019-12-16 PROCEDURE — 3078F PR MOST RECENT DIASTOLIC BLOOD PRESSURE < 80 MM HG: ICD-10-PCS | Mod: CPTII,S$GLB,, | Performed by: NURSE PRACTITIONER

## 2019-12-16 PROCEDURE — 99999 PR PBB SHADOW E&M-EST. PATIENT-LVL IV: ICD-10-PCS | Mod: PBBFAC,,, | Performed by: NURSE PRACTITIONER

## 2019-12-16 PROCEDURE — 3078F DIAST BP <80 MM HG: CPT | Mod: CPTII,S$GLB,, | Performed by: NURSE PRACTITIONER

## 2019-12-16 PROCEDURE — 3078F PR MOST RECENT DIASTOLIC BLOOD PRESSURE < 80 MM HG: ICD-10-PCS | Mod: CPTII,S$GLB,, | Performed by: PHYSICIAN ASSISTANT

## 2019-12-16 PROCEDURE — 3074F SYST BP LT 130 MM HG: CPT | Mod: CPTII,S$GLB,, | Performed by: PHYSICIAN ASSISTANT

## 2019-12-16 PROCEDURE — 99213 PR OFFICE/OUTPT VISIT, EST, LEVL III, 20-29 MIN: ICD-10-PCS | Mod: S$GLB,,, | Performed by: PHYSICIAN ASSISTANT

## 2019-12-16 PROCEDURE — 99213 OFFICE O/P EST LOW 20 MIN: CPT | Mod: S$GLB,,, | Performed by: PHYSICIAN ASSISTANT

## 2019-12-16 PROCEDURE — 3008F PR BODY MASS INDEX (BMI) DOCUMENTED: ICD-10-PCS | Mod: CPTII,S$GLB,, | Performed by: NURSE PRACTITIONER

## 2019-12-16 PROCEDURE — 3078F DIAST BP <80 MM HG: CPT | Mod: CPTII,S$GLB,, | Performed by: PHYSICIAN ASSISTANT

## 2019-12-16 PROCEDURE — 3008F BODY MASS INDEX DOCD: CPT | Mod: CPTII,S$GLB,, | Performed by: PHYSICIAN ASSISTANT

## 2019-12-16 RX ORDER — MIRTAZAPINE 7.5 MG/1
7.5 TABLET, FILM COATED ORAL NIGHTLY
Qty: 30 TABLET | Refills: 3 | Status: SHIPPED | OUTPATIENT
Start: 2019-12-16 | End: 2020-04-14

## 2019-12-16 RX ORDER — DICLOFENAC SODIUM 10 MG/G
2 GEL TOPICAL 4 TIMES DAILY
Qty: 1 TUBE | Refills: 1 | Status: SHIPPED | OUTPATIENT
Start: 2019-12-16

## 2019-12-16 NOTE — LETTER
December 16, 2019      MERRITT Solo  [address]  [place] [address]           [facility]  [address]  [facility] [address]  Phone: [phone]  Fax: [phone]          Patient: Wen Mares   MR Number: [id number]   YOB: 1960   Date of Visit: 12/16/2019       Dear Cristine Austin:    Thank you for referring Wen Mares to me for evaluation. Attached you will find relevant portions of my assessment and plan of care.    If you have questions, please do not hesitate to call me. I look forward to following Wen Mares along with you.    Sincerely,    NICOLAS Burroughs    Enclosure  CC:  No Recipients    If you would like to receive this communication electronically, please contact [email or web address] or [phone] to request more information on [facility] Link access.    For providers and/or their staff who would like to refer a patient to [facility], please contact us through our one-stop-shop provider referral line, [facility], at [phone].    If you feel you have received this communication in error or would no longer like to receive these types of communications, please e-mail [email or web address]

## 2019-12-16 NOTE — LETTER
December 16, 2019      MERRITT Solo  [address]  [place] [address]           [facility]  [address]  [facility] [address]  Phone: [phone]  Fax: [phone]          Patient: Wen Mares   MR Number: [id number]   YOB: 1960   Date of Visit: 12/16/2019       Dear Cristine Austin:    Thank you for referring Wen Mares to me for evaluation. Attached you will find relevant portions of my assessment and plan of care.    If you have questions, please do not hesitate to call me. I look forward to following Wen Mares along with you.    Sincerely,    Lupis Gutierrez, SHERITA    Enclosure  CC:  No Recipients    If you would like to receive this communication electronically, please contact [email or web address] or [phone] to request more information on [facility] Link access.    For providers and/or their staff who would like to refer a patient to [facility], please contact us through our one-stop-shop provider referral line, [facility], at [phone].    If you feel you have received this communication in error or would no longer like to receive these types of communications, please e-mail [email or web address]

## 2019-12-16 NOTE — PROGRESS NOTES
"    [facility] Consultation Note    Reason for Consult:  The primary encounter diagnosis was Upper abdominal pain. Diagnoses of Hemorrhoids, unspecified hemorrhoid type and Anxiety and depression were also pertinent to this visit.    PCP:   Cristine Austin   [address] [facility] / [place] [address]    Referring MD:  Shoshana Solo  [address] [facility]  [place]    HPI:  This is a 59 y.o. female here for evaluation of GERD, bloating and abdominal pain.  She is an established patient last seen by  Dr. Moncada in clinic 11/2018, this visit was reviewed.  At that visit he placed the patient on Remeron since she had a lot of life stressors and negative workup for her abdominal pain.  Today she reports the upper abd pain she has today is the same she had in the past when she was worked up.  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  Can or can not be related to meals. "I worry too much about my house".  Pain does not wake her up at night. Rare nausea, takes pepto bismul , helps. No vomiting, dysphagia.  The only thing that helps her abdominal pain is Valium 5 mg that she takes as needed.  Reflux includes some pyrosis and belching. Was taking zantac, stopped. Has not tried taking pepcid. Takes omeprazole Q AM after breakfast. Taking carafate BID before meals, feels this helps.    She buys digestive probiotics, she takes QD. Gives her a soft, normal BM. No hematochezia or melena.  Eats at 10-11 pm every night, beans, potato chips, every night.  Hemorrhoids are flared. Saw [facility] for treatment over a year ago, did not f/u.  "I do not feel like doing anything for myself, I feel like I need sometime for my nerves since my son got killed"  "I haven drinking about 2 beers every day for the past month".    ROS:  Constitutional: No fevers, chills, No " weight loss  ENT: No allergies  CV: No chest pain  Pulm: No cough, + shortness of breath  Ophtho: No vision changes  GI: see HPI  Derm: No rash  Heme: No lymphadenopathy, No bruising  MSK: + arthritis  : No dysuria, No hematuria  Endo: No hot or cold intolerance  Neuro: No syncope, No seizure  Psych: + anxiety, + depression    Medical History:  has a past medical history of Anxiety, Arthritis, COPD (chronic obstructive pulmonary disease), Depression, GERD (gastroesophageal reflux disease), and Hypertension.    Surgical History:  has a past surgical history that includes Colonoscopy (N/A, 12/6/2018).    Family History: family history includes Cancer in her brother and mother; Diabetes in her maternal uncle..     Social History:  reports that she quit smoking about 23 years ago. She has never used smokeless tobacco. She reports that she drinks alcohol. She reports that she does not use drugs.    Review of patient's allergies indicates:  No Known Allergies    Current Outpatient Medications on File Prior to Visit   Medication Sig Dispense Refill    acyclovir (ZOVIRAX) 400 MG tablet Take by mouth 2 (two) times daily.      albuterol (PROVENTIL/VENTOLIN HFA) 90 mcg/actuation inhaler Inhale 2 puffs into the lungs.      aspirin (ECOTRIN) 81 MG EC tablet Take 81 mg by mouth once daily.      cyproheptadine (PERIACTIN) 4 mg tablet Take 4 mg by mouth 3 (three) times daily as needed.      ferrous sulfate 325 (65 FE) MG EC tablet Take 325 mg by mouth 3 (three) times daily with meals.        hydrochlorothiazide (HYDRODIURIL) 25 MG tablet Take 25 mg by mouth once daily.      methylPREDNISolone (MEDROL DOSEPACK) 4 mg tablet use as directed 1 Package 0    mirabegron (MYRBETRIQ) 25 mg Tb24 ER tablet Take 1 tablet (25 mg total) by mouth once daily. 30 tablet 11    omeprazole (PRILOSEC) 40 MG capsule Take 40 mg by mouth once daily.      oxybutynin (DITROPAN-XL) 10 MG 24 hr tablet Take 10 mg by mouth once daily.  11     "potassium chloride SA (K-DUR,KLOR-CON) 20 MEQ tablet TAKE 1 TABLET (20 MEQ) BY ORAL ROUTE ONCE DAILY WITH FOOD  0    sucralfate (CARAFATE) 1 gram tablet Take 1 tablet (1 g total) by mouth 4 (four) times daily. Separate from other meds by 1 hr. 60 tablet 1    VITAMIN D3 1,000 unit capsule Take by mouth once daily.  2    acyclovir (ZOVIRAX) 400 MG tablet Take by mouth.      aspirin (ECOTRIN) 81 MG EC tablet Take 81 mg by mouth.      cyproheptadine (PERIACTIN) 4 mg tablet Take 4 mg by mouth.      ferrous sulfate 325 (65 FE) MG EC tablet Take 325 mg by mouth.      hydroCHLOROthiazide (HYDRODIURIL) 25 MG tablet Take 25 mg by mouth.      mupirocin (BACTROBAN) 2 % ointment Apply to affected area 3 times daily 22 g 0    omeprazole (PRILOSEC) 40 MG capsule Take 40 mg by mouth.      potassium chloride SA (K-DUR,KLOR-CON) 20 MEQ tablet TAKE 1 TABLET (20 MEQ) BY ORAL ROUTE ONCE DAILY WITH FOOD      PROAIR HFA 90 mcg/actuation inhaler Inhale 2 puffs into the lungs every 4 (four) hours as needed.  3    sodium,potassium,mag sulfates (SUPREP BOWEL PREP KIT) 17.5-3.13-1.6 gram SolR Please dispense as directed/ 1 kit (Patient not taking: Reported on 12/16/2019) 1 Bottle 0    sucralfate (CARAFATE) 1 gram tablet Take 1 g by mouth.       No current facility-administered medications on file prior to visit.          Objective Findings:    Vital Signs:  /76 (BP Location: Left arm)   Pulse 73   Ht 5' 2" (1.575 m)   Wt 66.1 kg (145 lb 11.6 oz)   BMI 26.65 kg/m²   Body mass index is 26.65 kg/m².    Physical Exam:  General Appearance: Well appearing in no acute distress. Tearful  Head:   Normocephalic, without obvious abnormality  Eyes:    No scleral icterus  ENT: Neck supple  Lungs: CTA bilaterally in anterior and posterior fields, no wheezes, no crackles.  Heart:  Regular rate and rhythm, S1, S2 normal, no murmurs heard  Abdomen: Soft, non tender, non distended with positive bowel sounds in all four quadrants. No " hepatosplenomegaly, ascites, or mass  Extremities: No edema  Skin: No rash  Neurologic: AAO x 3      Labs:  Lab Results   Component Value Date    WBC 6.47 04/11/2018    HGB 13.9 04/11/2018    HCT 41.7 04/11/2018     04/11/2018    ALT 17 04/11/2018    AST 15 04/11/2018     04/11/2018    K 3.2 (L) 04/11/2018     04/11/2018    CREATININE 0.8 04/11/2018    BUN 12 04/11/2018    CO2 29 04/11/2018    HGBA1C 5.6 04/26/2018       Imaging reviewed:  04/26/2018 CT abdomen and pelvis with and without contrast.  Endoscopy: reviewed    4/2018 EGD Normal examined duodenum. Biopsied.                        - Erythematous mucosa in the antrum and prepyloric                         region of the stomach. Biopsied. Normal bx                        - 2 cm hiatal hernia.    12/2018 Colonoscopy   - One small cecum polyp was resected and retrieved.                        - Medium sized internal hemorrhoids. REPEAT 5 yrs  Assessment:  Ms. Mares is a 59 y.o. BF:  1. Upper abdominal pain    2. Hemorrhoids, unspecified hemorrhoid type    3. Anxiety and depression         Patient tearful during exam and discussed many life stressors that she knows are aggravating her abdominal pain. She never started the Remeron Dr. Moncada prescribed to her over a year ago.  Will initiate TCA therapy today.  We discussed this may take a couple of weeks to reach a maximal benefit and that her dose may be increased in the future.  Also recommended to patient today that she seek psych evaluation for her anxiety and depression.  She has been recommended to see psych by other providers in the past and has yet to do so.  Since she had normal scopes an unremarkable CT scan last year for the same pain and do not think we need to repeat those test at this time.  However that may be something to consider in the future if she does not respond to the recommendations today.    Recommendations:  1.  Continue omeprazole and Carafate  2.  FODMAP  diet  3.  Call psych for an appointment  4.  Referral to colorectal surgery for hemorrhoid treatment.  We did discuss fiber supplementation daily with plenty of water.  5.  Initiate Remeron.  Patient to follow-up in 8 weeks and will increase dose if needed.  6.  Abdominal ultrasound  7.  Labs    Follow up in about 8 weeks (around 2/10/2020).      Order summary:  Orders Placed This Encounter    US Abdomen Limited    H.Pylori Antibody IgG    Ambulatory Referral to Colorectal Surgery    mirtazapine (REMERON) 7.5 MG Tab         Thank you so much for allowing me to participate in the care of Wen Gutierrez, MERRITT-C

## 2019-12-16 NOTE — PATIENT INSTRUCTIONS
Stop eating late at night, especially fatty, junk foods.   Start taking a fiber supplement such as Benefiber or Citrucel.    The following foods have been identified as being high in FODMAPs:   THESE ARE FOODS TO AVOID  Fruits    Apples  Apricots  Blackberries  Cherries  Grapefruit  Tito  Nectarines  Peaches  Pears  Plums and prunes  Pomegranates  Watermelon  High concentration of fructose from canned fruit, dried fruit or fruit juice  Grains    Barley  Couscous  Farro  Rye  Semolina  Wheat  Lactose-Containing Foods    Buttermilk  Cream  Custard  Ice cream  Margarine  Milk (cow, goat, sheep)  Soft cheese, including cottage cheese and ricotta  Yogurt (regular and Greek)  Dairy Substitutes    Oat milk (although a 1/8 serving is considered low-FODMAP)  Soy milk ([place]  Legumes    Baked beans  Black-eyed peas  Butter beans  Chickpeas  Lentils  Kidney beans  Lima beans  Soybeans  Split peas  Sweeteners    Agave  Fructose  High fructose corn syrup  Honey  Isomalt  Maltitol  Mannitol  Molasses  Sorbitol  Xylitol  Vegetables    Artichokes  Asparagus  Beets  [place] sprouts  Cauliflower  Celery  Garlic  Leeks  Mushrooms  Okra  Onions  Peas  Scallions (white parts)  Shallots  Snow peas  Sugar snap peas          The following foods have been identified as being low in FODMAPs:  THESE ARE FOODS THAT ARE OKAY TO EAT  Fruits    Avocado (limit 1/8 of whole)  Banana  Blueberry  Cantaloupe  Grapes  Honeydew melon  Kiwi  Lemon  Lime  Mandarin oranges  Olives  Orange  Papaya  Plantain  Pineapple  Raspberry  Rhubarb  Strawberry  Tangelo  Sweeteners    Artificial sweeteners that do not end in -ol  Brown sugar  Glucose  Maple syrup  Powdered sugar  Sugar (sucrose)  Dairy and Alternatives    [place] milk  Coconut milk (limit 1/2 cup)  Hemp milk  Rice milk  Butter  Certain cheeses, such as  brie, camembert, mozzarella, Parmesan  Lactose-free products, such as lactose-free milk, ice cream, and yogurt  Vegetables    Arugula ([facility]  lettuce)  Bamboo shoots  Bell peppers  Broccoli  Bok marzena  Carrots  Celeriac  Db greens  Common Cabbage  Corn (half a cob)  Eggplant  Endive  Fennel  Green beans  Kale  Lettuce  Parsley  Parsnip  Potato  Radicchio   Scallions (green parts only)  Spinach, baby  Squash  Sweet potato  Swiss chard  Tomato  Turnip  Water chestnut  Zucchini  Grains    Amaranth  Brown rice  Bulgur wheat (limit to 1/4 cup cooked)  Oats  Gluten-free products  Quinoa  Spelt products  Nuts    Almonds (limit 10)  Brazil nuts  Hazelnuts (limit 10)  Macadamia nuts  Peanuts  Pecan  Pine nuts  Walnuts  Seeds    [place]  Tom  Pumpkin  Sesame  Sunflower  Protein Sources    Beef  Chicken  Eggs  Fish  Lamb  Pork  Shellfish  Tofu and tempeh  [place]

## 2019-12-17 NOTE — PROGRESS NOTES
[facility]  [facility] [place]  12/16/2019      REFERRING PROVIDER: MERRITT Solo  [address]  [place] [address]    SUBJECTIVE:   Ms. Wen Mares is a 59 y.o. female who presents today complaining of LEFT breast pain.    History of Present Illness: Patient states she has had LEFT breast pain for the past year.  She describes it as a dull discomfort that is worse when she gets aggravated or upset.  She said she went to [facility] and they did a breast and cardiac workup, both of which were negative.   Per patient her last mammogram was at [facility] on [address] and benign (about 6 months ago).  Prior to that they were done at [facility].  She denies any history of breast biopsies.  She denies any nipple discharge or nipple inversion.  She denies palpating any breast masses bilaterally.     Her caffeine intake consists of 1 cup of coffee per day, 1 tea per day, and an occasional coke a few times per week.    Past Medical History:   Diagnosis Date    Anxiety     Arthritis     COPD (chronic obstructive pulmonary disease)     Depression     GERD (gastroesophageal reflux disease)     Hypertension      Past Surgical History:   Procedure Laterality Date    COLONOSCOPY N/A 12/6/2018    Procedure: COLONOSCOPY;  Surgeon: Endy Moncada MD;  Location: [facility] ([address] [place] [address]);  Service: Endoscopy;  Laterality: N/A;  PM Prep -      Social History     Socioeconomic History    Marital status: Single     Spouse name: Not on file    Number of children: Not on file    Years of education: Not on file    Highest education level: Not on file   Occupational History    Not on file   Social Needs    Financial resource strain: Not on file    Food insecurity:     Worry: Not on file     Inability: Not on file    Transportation needs:     Medical: Not on file     Non-medical: Not on file   Tobacco Use    Smoking status: Former Smoker     Last attempt to quit: 12/29/1995      Years since quittin.9    Smokeless tobacco: Never Used   Substance and Sexual Activity    Alcohol use: Yes     Comment: weekends    Drug use: No    Sexual activity: Not on file   Lifestyle    Physical activity:     Days per week: Not on file     Minutes per session: Not on file    Stress: Not on file   Relationships    Social connections:     Talks on phone: Not on file     Gets together: Not on file     Attends Rastafari service: Not on file     Active member of club or organization: Not on file     Attends meetings of clubs or organizations: Not on file     Relationship status: Not on file   Other Topics Concern    Not on file   Social History Narrative    Not on file     Review of patient's allergies indicates:  No Known Allergies     Family History: No known family history of breast or ovarian cancer. Mother and brother had lung cancer.    OB/Gyn history:    Number of pregnancies & age at first gestation:  (23)   Age of menarche & menopause:13; 48   Body mass index is 24.87 kg/m².   Contraceptive Use/ HRT: HRT with estrogen >5 years ago.:   Number of previous biopsies:0    Review of Systems: Denies any chest pain or shortness of breath.  Denies any fever or chills.   Positive for depression and anxiety (since her son  in a motorcycle accident; patient denies any thoughts of harming herself; states she sometimes thinks about harming others but denies having a plan and states she would never actually do anything to hurt anyone).    Positive for loss of appetite.  Negative for unexplained weight loss.   See HPI/ Interval History for other systems reviewed.    OBJECTIVE:   Vitals:    19 1316   BP: (!) 144/71   Pulse: 63   Temp: 98.4 °F (36.9 °C)      Physical Exam:  HEENT: Normocephalic, atraumatic.    General: alert and oriented; no acute distress.  Breast: No masses present bilaterally.  Normal color and contour of right and left breast.  No nipple inversion or discharge bilaterally.     Lymph: No palpable adjacent axillary lymph nodes.      ASSESSMENT:  Ms. Wen Mares is a 59 y.o. year old female with LEFT breast pain.    PLAN:   We discussed that she would benefit from talking to a physiatrist about her son's death.  I put in a referral to psychiatry.  I also offered for her to be seen by one at [facility] and printed the referral for her to go there if she prefers.  We discussed that the most common cause of breast pain is caffeine. I advised her to try cutting back on this, but if it doesn't help it could be due to hormonal changes.  I also prescribed Diclofenac gel and recommended over the counter Vitamin E and evening primrose oil tablets.  There was nothing concerning on clinical breast exam today, and she can follow up with me as needed.    ~Ericka Anders PA-C      Surgical Oncology            12/16/2019

## 2019-12-18 ENCOUNTER — TELEPHONE (OUTPATIENT)
Dept: SURGERY | Facility: CLINIC | Age: 59
End: 2019-12-18

## 2019-12-18 ENCOUNTER — TELEPHONE (OUTPATIENT)
Dept: GASTROENTEROLOGY | Facility: CLINIC | Age: 59
End: 2019-12-18

## 2019-12-18 ENCOUNTER — HOSPITAL ENCOUNTER (OUTPATIENT)
Dept: RADIOLOGY | Facility: HOSPITAL | Age: 59
Discharge: HOME | End: 2019-12-18
Attending: NURSE PRACTITIONER
Payer: MEDICARE

## 2019-12-18 DIAGNOSIS — R10.10 UPPER ABDOMINAL PAIN: ICD-10-CM

## 2019-12-18 DIAGNOSIS — K27.9 H PYLORI ULCER: Primary | ICD-10-CM

## 2019-12-18 DIAGNOSIS — B96.81 H PYLORI ULCER: Primary | ICD-10-CM

## 2019-12-18 DIAGNOSIS — A04.8 H. PYLORI INFECTION: ICD-10-CM

## 2019-12-18 LAB — H PYLORI IGG SERPL QL IA: POSITIVE

## 2019-12-18 PROCEDURE — 76705 US ABDOMEN LIMITED: ICD-10-PCS | Mod: 26,,, | Performed by: RADIOLOGY

## 2019-12-18 PROCEDURE — 76705 ECHO EXAM OF ABDOMEN: CPT | Mod: TC

## 2019-12-18 PROCEDURE — 76705 ECHO EXAM OF ABDOMEN: CPT | Mod: 26,,, | Performed by: RADIOLOGY

## 2019-12-18 RX ORDER — CLARITHROMYCIN 500 MG/1
500 TABLET, FILM COATED ORAL EVERY 12 HOURS
Qty: 28 TABLET | Refills: 0 | Status: SHIPPED | OUTPATIENT
Start: 2019-12-18 | End: 2020-01-01

## 2019-12-18 RX ORDER — AMOXICILLIN 500 MG/1
1000 TABLET, FILM COATED ORAL EVERY 12 HOURS
Qty: 56 TABLET | Refills: 0 | Status: SHIPPED | OUTPATIENT
Start: 2019-12-18 | End: 2020-01-01

## 2019-12-18 NOTE — TELEPHONE ENCOUNTER
Contacted patient regarding message below. Patient voiced questions about the referral to psychiatry that NICOLAS Carter. I answered the patient questions. Patient voiced understanding of the information.       ----- Message from Elise Higuera sent at 12/18/2019  2:23 PM CST -----  Contact: Pt   Reason: Pt is calling to discuss some paperwork she received.    Communication: [phone]

## 2019-12-18 NOTE — TELEPHONE ENCOUNTER
H. Pylori Pos. Will treat with biaxin triple therapy. Pt needs to take her PPI BID only while on abx. MA to push f/u appt to 8-12 weeks from now.

## 2019-12-18 NOTE — TELEPHONE ENCOUNTER
MA spoke with pt to give test results per Lupis. MA explained to pt that H.Pylori was bacteria in the stomach .  Pt then verbalized understanding. Pt repeated back to MA how she is to take the Abx  As prescribed on bottle, and the omeprazole twice a day for the 14 days and then pt can go back to taking the omeprazole for the 14 days but must stop 2 weeks before appointment on 2/19. MA will mail out reminder to address on file, pt verbalized understanding.

## 2019-12-18 NOTE — TELEPHONE ENCOUNTER
MA contacted pt to give test results per Lupis. Pt verbalized understanding , pt is upset saying she know something is wrong with her and is requesting a call from Lupis . MA also told pt that Lupis is in full clinic today and was not sure if she can.             ----- Message from Lupis Gutierrez NP sent at 12/18/2019  1:23 PM CST -----  Abd US normal

## 2019-12-20 ENCOUNTER — TELEPHONE (OUTPATIENT)
Dept: GASTROENTEROLOGY | Facility: CLINIC | Age: 59
End: 2019-12-20

## 2019-12-20 NOTE — TELEPHONE ENCOUNTER
MA called and spoke to pt. MA also called [facility] on [facility] to have script transferred. MA spoke to Phi in the pharmacy and they have it in stock. MA notified pt script will be filled at that location per pt request.   Pt verbalized understanding.    ----- Message from Karine Arias sent at 12/20/2019  2:39 PM CST -----  Contact: pt   Pt want to know if you could send Rx of clarithromycin (BIAXIN) 500 MG tablet to another pharmacy. She is having trouble with getting [facility] to order it. Pt call back # [phone]    Please sent it to this [facility]  [facility]

## 2019-12-23 ENCOUNTER — TELEPHONE (OUTPATIENT)
Dept: GASTROENTEROLOGY | Facility: CLINIC | Age: 59
End: 2019-12-23

## 2019-12-23 NOTE — TELEPHONE ENCOUNTER
MA spoke with pt and told her per  that since she is on the Carafate and Omeprazole that she can try Gaviscon . Also told pt per Dr. Mcgowan theres not need to rush into a different Abx, but it can be rough. Pt hung up the phone and said ok.

## 2019-12-23 NOTE — TELEPHONE ENCOUNTER
Pt gave the clinic a call. Pt states that the Clarithromycin that was prescribed for her to treat her h.pylori had her with the shakes, severe diarrhea and vomiting.         Pt was taking  The clarithromycin , amoxicillin and omeprazole for treatment..      Pt I aware james is out this week and MA will reach out to  for assistance.

## 2019-12-23 NOTE — TELEPHONE ENCOUNTER
MA contacted pt to inform her of Dr. Mcgowan recommendations. I explained to pt that she should wait a week or two and try a different regiment with the abx. Pt is upset and stated that she took it only once and that was her reaction from the clarithromycin. Wants to know if there is anything she can do to relieve the abdominal pain

## 2019-12-30 ENCOUNTER — TELEPHONE (OUTPATIENT)
Dept: GASTROENTEROLOGY | Facility: CLINIC | Age: 59
End: 2019-12-30

## 2019-12-30 DIAGNOSIS — A04.8 H. PYLORI INFECTION: Primary | ICD-10-CM

## 2019-12-30 NOTE — TELEPHONE ENCOUNTER
PT is calling because she says she tried gaviscon in the past and does not want to talk to anyone but Lupis.         Pt wants to speak with lupis never lets me get anything in or doesn't respond to me saying anything and hangs up. I did send a message to lupis but this happens on every telephone encounter when she is upset.         ----- Message from Lila Vale sent at 12/30/2019 11:57 AM CST -----  Contact: [phone]  Matt pt-Pt is calling in ref to her meds.  Has been waiting since December 18.  Pt is unhappy.  Expects  A call today from someone.  Thanks.

## 2020-01-02 RX ORDER — BISMUTH SUBCITRATE POTASSIUM, METRONIDAZOLE AND TETRACYCLINE HYDROCHLORIDE 140; 125; 125 MG/1; MG/1; MG/1
3 CAPSULE ORAL
Qty: 120 CAPSULE | Refills: 0 | Status: SHIPPED | OUTPATIENT
Start: 2020-01-02 | End: 2020-01-12

## 2020-01-02 NOTE — TELEPHONE ENCOUNTER
Will try diff abx regimen, Pylera, for her H. Pylori since she did not tolerate Biaxin Triple.  She needs to take it as prescribed as well as her protonix BID.  MA to find out if pt started the remeron I prescribed too as this may be functional in nature as well.    Her pain could be related to the life stressors she has going on in her personal life. This is something we have already discussed in clinc. At this time there is nothing else for me to prescribe for the pain since she is already on PPI, Carafate, and hopefully the remeron.

## 2020-01-02 NOTE — TELEPHONE ENCOUNTER
Spoke with patient. Informed her that SHERITA Gutierrez sent Pylera to her pharmacy since she did not tolerated Biaxin Triple. Also informed her that she needs to take her protonix bid. Patient reports that since she did not hear from Sherita Gutierrez she went to her PCP who prescribed Tetracycline for her. This prescription cost her 98 dollars, patient wants to know if to take this since she paid for it already rather than the pylera. Patient states she has not started taking the remeron that was prescribed. Encouraged her to start taking Remeron because her problems may be functional in nature as well. Patient awaiting call back regarding tetracycline

## 2020-01-02 NOTE — TELEPHONE ENCOUNTER
Informed patient that if she is taking tetracycline then she does not have to take Pylera. Informed her of SHERITA Gutierrez message. Patient is asking about the Biaxin Triple therapy, if she should try that again. Instructed patient not to take Biaxin Triple therapy. Patient verbalized understanding. She states she will not take Tetracycline, she will take the Pylera because SHERITA Gutierrez is the person who found the infection. Instructed patient to take her protonix bid, patient states she is taking Omeprazole. Also instructed patient to start taking Remeron as prescribed, patient verbalized understanding. Instructed to call with any questions.

## 2020-01-06 ENCOUNTER — TELEPHONE (OUTPATIENT)
Dept: GASTROENTEROLOGY | Facility: CLINIC | Age: 60
End: 2020-01-06

## 2020-01-06 NOTE — TELEPHONE ENCOUNTER
MA spoke with pt and explained that, there is no food restrictions with taking Abx but she does need to avoid beer or any alcoholic beverages. Pt verbalized understanding and was also told that Abx sometimes does cause nausea , vomiting and BMs. Pt verbalized understanding.       ----- Message from Miguel Hernadez sent at 1/6/2020  1:51 PM CST -----  Contact: self  Att: Nirali    Pt is calling in reference to medication Pylera she has questions and concerns about what she can and can not eat while on this medication pt can be reached at [phone]

## 2020-02-06 ENCOUNTER — TELEPHONE (OUTPATIENT)
Dept: GASTROENTEROLOGY | Facility: CLINIC | Age: 60
End: 2020-02-06

## 2020-02-06 NOTE — TELEPHONE ENCOUNTER
MA spoke to pt . Pt was calling about Mirtazapine. Pt says the medication has her very depressed and crying and wants to know if there is something else that she can take.       ----- Message from Yolanda Victoria sent at 2/6/2020  3:22 PM CST -----  Contact: patient  Please call above patient concerning blood pressure medication said this is her second call.please call [phone] thanks.

## 2020-02-10 NOTE — TELEPHONE ENCOUNTER
Our visit was on 12/16/2019. She should have taken more than 3 tabs of the remeron but did not. In fact, Dr. Moncada recommended remeron 11/2018 and she never started.  Our f/u visit was to see how the pain responded to the remeron and increase the dose if I needed to. Since we are not at that stage then our f/u appt is not necessary. Her anxiety and depression need to be addressed, and she has not had that done yet. Once those are addressed and treated it will be much easier for me to address her abd pain if its still occurring. Once she gets those addressed then I will be happy to see her back in clinic

## 2020-02-10 NOTE — TELEPHONE ENCOUNTER
MA contacted pt.to ask a few questions per Lupis.That is a antidepressant medication.   How long as she been taking this medication? Does she have thoughts of harming herself or others? If so then she needs to go to the ER. But she has an underlying depression. She needs to talk to her PCP or Psych about her depression. Did she ever reach out to a psych MD? This was something we discussed at our visit and I highly recommend she do this.        Pt says she has only taken the Medication 3 times. Pt does not have thoughts of harming herself . Pt has not reached out to Psych MD . Pt was very dismissive of all questions MA asked per Lupis and cut MA off . Pt sated Lupis does not have to prescribe anything for her. MA told pt she needs to discuss this with her PCP , this is something Lupis has told her in the past. Pt said okay thank you and hung up.

## 2020-02-10 NOTE — TELEPHONE ENCOUNTER
Spoke with patient.  She informed me that her appointment on 2/19 was to recheck for H pylori.  I asked her when she stopped taking the omeprazole and she said she took it yesterday.  I informed her that she had to be off of all PPI (and listed them) them as well as all [id number] (and listed them for her).  She said she was only taking the omeprazole.  We rescheduled her appointment for 3/2/2020 at 1:00 p.m. And I again informed her about the PPIs and H2 blockers, including antacids.  She verbalized all understanding to all instructions and accepted appt on 3/2.

## 2020-02-18 ENCOUNTER — OFFICE VISIT (OUTPATIENT)
Dept: URGENT CARE | Facility: CLINIC | Age: 60
End: 2020-02-18
Payer: MEDICARE

## 2020-02-18 VITALS
HEIGHT: 62 IN | OXYGEN SATURATION: 99 % | HEART RATE: 100 BPM | RESPIRATION RATE: 18 BRPM | DIASTOLIC BLOOD PRESSURE: 78 MMHG | WEIGHT: 136 LBS | SYSTOLIC BLOOD PRESSURE: 113 MMHG | TEMPERATURE: 99 F | BODY MASS INDEX: 25.03 KG/M2

## 2020-02-18 DIAGNOSIS — J02.9 SORE THROAT: ICD-10-CM

## 2020-02-18 DIAGNOSIS — B34.9 ACUTE VIRAL SYNDROME: Primary | ICD-10-CM

## 2020-02-18 DIAGNOSIS — M54.30 SCIATICA, UNSPECIFIED LATERALITY: ICD-10-CM

## 2020-02-18 LAB
CTP QC/QA: YES
FLUAV AG NPH QL: NEGATIVE
FLUBV AG NPH QL: NEGATIVE

## 2020-02-18 PROCEDURE — 87804 POCT INFLUENZA A/B: ICD-10-PCS | Mod: QW,S$GLB,, | Performed by: EMERGENCY MEDICINE

## 2020-02-18 PROCEDURE — 87804 INFLUENZA ASSAY W/OPTIC: CPT | Mod: QW,S$GLB,, | Performed by: EMERGENCY MEDICINE

## 2020-02-18 PROCEDURE — 96372 THER/PROPH/DIAG INJ SC/IM: CPT | Mod: S$GLB,,, | Performed by: EMERGENCY MEDICINE

## 2020-02-18 PROCEDURE — 99214 PR OFFICE/OUTPT VISIT, EST, LEVL IV, 30-39 MIN: ICD-10-PCS | Mod: 25,S$GLB,, | Performed by: EMERGENCY MEDICINE

## 2020-02-18 PROCEDURE — 99214 OFFICE O/P EST MOD 30 MIN: CPT | Mod: 25,S$GLB,, | Performed by: EMERGENCY MEDICINE

## 2020-02-18 PROCEDURE — 96372 PR INJECTION,THERAP/PROPH/DIAG2ST, IM OR SUBCUT: ICD-10-PCS | Mod: S$GLB,,, | Performed by: EMERGENCY MEDICINE

## 2020-02-18 RX ORDER — OSELTAMIVIR PHOSPHATE 75 MG/1
75 CAPSULE ORAL 2 TIMES DAILY
Qty: 10 CAPSULE | Refills: 0 | Status: SHIPPED | OUTPATIENT
Start: 2020-02-18 | End: 2020-02-23

## 2020-02-18 RX ORDER — KETOROLAC TROMETHAMINE 30 MG/ML
60 INJECTION, SOLUTION INTRAMUSCULAR; INTRAVENOUS
Status: COMPLETED | OUTPATIENT
Start: 2020-02-18 | End: 2020-02-18

## 2020-02-18 RX ORDER — IBUPROFEN 800 MG/1
800 TABLET ORAL 3 TIMES DAILY
Qty: 30 TABLET | Refills: 0 | Status: SHIPPED | OUTPATIENT
Start: 2020-02-18 | End: 2021-12-08 | Stop reason: ALTCHOICE

## 2020-02-18 RX ADMIN — KETOROLAC TROMETHAMINE 60 MG: 30 INJECTION, SOLUTION INTRAMUSCULAR; INTRAVENOUS at 05:02

## 2020-02-18 NOTE — PROGRESS NOTES
[facility] - Visit Note                                           Chief Complaint  59 y.o. female with Sore Throat    History of Present Illness  Wen Mares presents to the urgent care with complaints of 2-3 days of cough, congestion, body aches, headache, fever.  Patient has associated sore throat.  She reports that she is feeling terrible.  She has been taking [place] cold and flu for symptoms.  Past Medical History:   Diagnosis Date    Anxiety     Arthritis     COPD (chronic obstructive pulmonary disease)     Depression     GERD (gastroesophageal reflux disease)     Hypertension      Past Surgical History:   Procedure Laterality Date    COLONOSCOPY N/A 12/6/2018    Procedure: COLONOSCOPY;  Surgeon: Endy Moncada MD;  Location: [address]);  Service: Endoscopy;  Laterality: N/A;  PM Prep - sm      Review of patient's allergies indicates:  No Known Allergies     Review of Systems and Physical Exam     Review of Systems  -- Constitution - no fever, no weight loss, no loss of consciousness  -- Eyes - no changes in vision, no redness, no swelling, no discharge  -- Ear, Nose - no  earache, no loss of hearing, no epistaxis  -- Mouth,Throat - reports sore throat, no toothache, normal voice, normal swallowing  -- Respiratory - report cough and congestion, no shortness of breath, no wheezing, no increased WOB   -- Cardiovascular - denies chest pain, no palpitations, no lower extremity edema  -- Gastrointestinal - denies abdominal pain, denies nausea, vomiting, and diarrhea  -- Genitourinary - no dysuria, denies flank pain, no hematuria or frequency   -- Musculoskeletal - denies back pain, negative for myalgias and arthralgias   -- Neurological - no headache, no neurologic changes, no loss of bladder or bowel function no seizure like activity, no changes in hearing or vision  -- Skin - denies skin changes, no rash, no hives, no suspected skin infection    Vital Signs   height is 5'  "2" (1.575 m) and weight is 61.7 kg (136 lb 0.4 oz). Her oral temperature is 98.6 °F (37 °C). Her blood pressure is 113/78 and her pulse is 100. Her respiration is 18 and oxygen saturation is 99%.      Physical Exam  -- Nursing note and vitals reviewed -   -- Constitutional:  Awake alert and oriented, GCS 15, no acute distress.  Appears well.  -- Head: Atraumatic. Normocephalic. No obvious abnormality  -- Eyes: Pupils are equal and reactive to light. Extraocular movements intact. No nystagmus.  No periorbital swelling. Normal conjunctiva.  -- Nose: Nose grossly normal in appearance, nares grossly normal. No rhinorrhea.  -- Throat: Mucous membranes moist, pharynx normal, normal tonsils.  Airway patent.  -- Ears: External ears and TM normal bilaterally. Normal hearing.   -- Neck: Normal range of motion. Neck supple. No meningismus. No adenopathy  -- Cardiac: Normal rate, regular rhythm and normal heart sounds. No carotid bruit. No lower extremity edema.  -- Pulmonary: Normal respiratory effort, breath sounds equal bilaterally. Adequate flow.  No wheezing.  No crackles. No increased work of breathing  -- Abdominal: Soft, no tenderness, no guarding, no rebound. Normal bowel sounds.   -- Musculoskeletal: Normal range of motion, all 4 extremities 5/5 strength.  Neurovascularly intact. Atraumatic. No deformities.  -- Neurological:  Cranial nerves 2-12 grossly intact. No focal deficits.   -- Vascular: Posterior tibial, dorsalis pedis and radial pulses 2+ bilaterally    -- Lymphatics: No cervical or peripheral lymphadenopathy.   -- Skin: Warm and dry. No evidence of rash or cellulitis  -- Psychiatric: Normal mood and affect. Bedside behavior is appropriate.  Patient is cooperative.  Denies suicidal homicidal ideation.    Emergency Room Course     Treatment Course, Evaluation, and Medical Decision Makin.  Physical exam unremarkable  2.  Influenza negative  3.  Suspect influenza based on timeline and symptoms. Patient " requests treatment with Tamiflu.  * door handle question, patient requests anti-inflammatory for sciatic nerve pain on the right.  Toradol 60 IM given    Diagnosis  --viral syndrome, influenza suspected  --sciatic pain, left  Disposition and Plan  -- Disposition: home  -- Condition: stable  -- Follow-up: Patient to follow up with MERRITT Solo in 1-2 days, and any specialists noted on discharge paperwork  -- I advised the patient that we have found no life threatening condition today and have provided recommendations his/her care  -- At this time, I believe the patient is clinically stable for discharge.   -- The patient acknowledges that ongoing follow up with a MD is required   -- Patient agrees to comply with all instruction and direction given in the urgent care  -- Patient counseled on strict return precautions as discussed

## 2020-02-18 NOTE — PATIENT INSTRUCTIONS
"Over the Counter Medications for Upper Respiratory Infection:    1. Ibuprofen 800 mg every 8 hours. May alternate with tylenol 1000 mg every 4 hours. (Do not take tylenol with other sources of acetaminophen such as Dayquil)  2. Zyrtec or Claritin 10 mg daily  3. Delsym or Dayquil for cough and congestion  4. Flonase for congestion and sinus pressure        Viral Syndrome (influenza suspected)  A viral illness may cause a number of symptoms. The symptoms depend on the part of the body that the virus affects. If it settles in your nose, throat, and lungs, it may cause cough, sore throat, congestion, and sometimes headache. If it settles in your stomach and intestinal tract, it may cause vomiting and diarrhea. Sometimes it causes vague symptoms like "aching all over," feeling tired, loss of appetite, or fever.  A viral illness usually lasts 1 to 2 weeks, but sometimes it lasts longer. In some cases, a more serious infection can look like a viral syndrome in the first few days of the illness. You may need another exam and additional tests to know the difference. Watch for the warning signs listed below.  Home care  Follow these guidelines for taking care of yourself at home:  · If symptoms are severe, rest at home for the first 2 to 3 days.  · Stay away from cigarette smoke - both your smoke and the smoke from others.  · You may use over-the-counter acetaminophen or ibuprofen for fever, muscle aching, and headache, unless another medicine was prescribed for this. If you have chronic liver or kidney disease or ever had a stomach ulcer or GI bleeding, talk with your doctor before using these medicines. No one who is younger than 18 and ill with a fever should take aspirin. It may cause severe disease or death.  · Your appetite may be poor, so a light diet is fine. Avoid dehydration by drinking 8 to 12 8-ounce glasses of fluids each day. This may include water; orange juice; lemonade; apple, grape, and cranberry juice; " clear fruit drinks; electrolyte replacement and sports drinks; and decaffeinated teas and coffee. If you have been diagnosed with a kidney disease, ask your doctor how much and what types of fluids you should drink to prevent dehydration. If you have kidney disease, drinking too much fluid can cause it build up in the your body and be dangerous to your health.  · Over-the-counter remedies won't shorten the length of the illness but may be helpful for cough, sore throat; and nasal and sinus congestion. Don't use decongestants if you have high blood pressure.  Follow-up care  Follow up with your healthcare provider if you do not improve over the next week.  Call 911  Get emergency medical care if any of the following occur:  · Convulsion  · Feeling weak, dizzy, or like you are going to faint  · Chest pain, shortness of breath, wheezing, or difficulty breathing  When to seek medical advice  Call your healthcare provider right away if any of these occur:  · Cough with lots of colored sputum (mucus) or blood in your sputum  · Chest pain, shortness of breath, wheezing, or difficulty breathing  · Severe headache; face, neck, or ear pain  · Severe, constant pain in the lower right side of your belly (abdominal)  · Continued vomiting (cant keep liquids down)  · Frequent diarrhea (more than 5 times a day); blood (red or black color) or mucus in diarrhea  · Feeling weak, dizzy, or like you are going to faint  · Extreme thirst  · Fever of 100.4°F (38°C) or higher, or as directed by your healthcare provider  Date Last Reviewed: 9/25/2015  © 5094-0238 [employer]. [address] [place] [address], [place] [address]. All rights reserved. This information is not intended as a substitute for professional medical care. Always follow your healthcare professional's instructions.

## 2020-02-20 ENCOUNTER — TELEPHONE (OUTPATIENT)
Dept: GASTROENTEROLOGY | Facility: CLINIC | Age: 60
End: 2020-02-20

## 2020-02-20 NOTE — TELEPHONE ENCOUNTER
MA contacted pt . MA told pt per  that she can take Tums up until 3 days before the appointment time. pt said that apple cider vinegar has been helping her. MA told pt that is find no PPI only Tums 3 days before appt on March 2 . Pt verbalized all understanding.          ----- Message from Lila Vale sent at 2/20/2020  1:31 PM CST -----  Contact: pt at [phone]  [id number] not to take her acid reflux med until march 2.  Having bad symptoms.  What do you recommend that she take in place of the acid reflux.  Pt also has the flu.  Pt is taking Tamaflu for 5 days from urgent care.

## 2020-03-02 ENCOUNTER — TELEPHONE (OUTPATIENT)
Dept: ENDOSCOPY | Facility: HOSPITAL | Age: 60
End: 2020-03-02

## 2020-03-02 ENCOUNTER — LAB VISIT (OUTPATIENT)
Dept: LAB | Facility: HOSPITAL | Age: 60
End: 2020-03-02
Payer: MEDICARE

## 2020-03-02 ENCOUNTER — OFFICE VISIT (OUTPATIENT)
Dept: GASTROENTEROLOGY | Facility: CLINIC | Age: 60
End: 2020-03-02
Payer: MEDICARE

## 2020-03-02 VITALS
DIASTOLIC BLOOD PRESSURE: 79 MMHG | WEIGHT: 138.44 LBS | BODY MASS INDEX: 25.48 KG/M2 | HEART RATE: 89 BPM | HEIGHT: 62 IN | SYSTOLIC BLOOD PRESSURE: 113 MMHG

## 2020-03-02 DIAGNOSIS — R63.4 WEIGHT LOSS: ICD-10-CM

## 2020-03-02 DIAGNOSIS — F41.9 ANXIETY AND DEPRESSION: ICD-10-CM

## 2020-03-02 DIAGNOSIS — F32.A ANXIETY AND DEPRESSION: ICD-10-CM

## 2020-03-02 DIAGNOSIS — A04.8 H. PYLORI INFECTION: Primary | ICD-10-CM

## 2020-03-02 DIAGNOSIS — R10.10 UPPER ABDOMINAL PAIN: ICD-10-CM

## 2020-03-02 LAB
CREAT SERPL-MCNC: 0.8 MG/DL (ref 0.5–1.4)
EST. GFR  (AFRICAN AMERICAN): >60 ML/MIN/1.73 M^2
EST. GFR  (NON AFRICAN AMERICAN): >60 ML/MIN/1.73 M^2

## 2020-03-02 PROCEDURE — 3074F PR MOST RECENT SYSTOLIC BLOOD PRESSURE < 130 MM HG: ICD-10-PCS | Mod: CPTII,S$GLB,, | Performed by: NURSE PRACTITIONER

## 2020-03-02 PROCEDURE — 3008F PR BODY MASS INDEX (BMI) DOCUMENTED: ICD-10-PCS | Mod: CPTII,S$GLB,, | Performed by: NURSE PRACTITIONER

## 2020-03-02 PROCEDURE — 99999 PR PBB SHADOW E&M-EST. PATIENT-LVL III: CPT | Mod: PBBFAC,,, | Performed by: NURSE PRACTITIONER

## 2020-03-02 PROCEDURE — 3078F PR MOST RECENT DIASTOLIC BLOOD PRESSURE < 80 MM HG: ICD-10-PCS | Mod: CPTII,S$GLB,, | Performed by: NURSE PRACTITIONER

## 2020-03-02 PROCEDURE — 82565 ASSAY OF CREATININE: CPT

## 2020-03-02 PROCEDURE — 36415 COLL VENOUS BLD VENIPUNCTURE: CPT

## 2020-03-02 PROCEDURE — 3078F DIAST BP <80 MM HG: CPT | Mod: CPTII,S$GLB,, | Performed by: NURSE PRACTITIONER

## 2020-03-02 PROCEDURE — 3008F BODY MASS INDEX DOCD: CPT | Mod: CPTII,S$GLB,, | Performed by: NURSE PRACTITIONER

## 2020-03-02 PROCEDURE — 99999 PR PBB SHADOW E&M-EST. PATIENT-LVL III: ICD-10-PCS | Mod: PBBFAC,,, | Performed by: NURSE PRACTITIONER

## 2020-03-02 PROCEDURE — 99215 PR OFFICE/OUTPT VISIT, EST, LEVL V, 40-54 MIN: ICD-10-PCS | Mod: S$GLB,,, | Performed by: NURSE PRACTITIONER

## 2020-03-02 PROCEDURE — 3074F SYST BP LT 130 MM HG: CPT | Mod: CPTII,S$GLB,, | Performed by: NURSE PRACTITIONER

## 2020-03-02 PROCEDURE — 99215 OFFICE O/P EST HI 40 MIN: CPT | Mod: S$GLB,,, | Performed by: NURSE PRACTITIONER

## 2020-03-02 NOTE — PROGRESS NOTES
"    [facility] Consultation Note    Reason for Consult:  The primary encounter diagnosis was H. pylori infection. Diagnoses of Anxiety and depression, Upper abdominal pain, and Weight loss were also pertinent to this visit.    PCP:   Cristine Austin       Referring MD:  No referring provider defined for this encounter.    HPI 12/16/2019:  This is a 59 y.o. female here for evaluation of GERD, bloating and abdominal pain.  She is an established patient last seen by  Dr. Moncada in clinic 11/2018, this visit was reviewed.  At that visit he placed the patient on Remeron since she had a lot of life stressors and negative workup for her abdominal pain.  Today she reports the upper abd pain she has today is the same she had in the past when she was worked up.  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  Can or can not be related to meals. "I worry too much about my house".  Pain does not wake her up at night. Rare nausea, takes pepto bismul , helps. No vomiting, dysphagia.  The only thing that helps her abdominal pain is Valium 5 mg that she takes as needed.  Reflux includes some pyrosis and belching. Was taking zantac, stopped. Has not tried taking pepcid. Takes omeprazole Q AM after breakfast. Taking carafate BID before meals, feels this helps.    She buys digestive probiotics, she takes QD. Gives her a soft, normal BM. No hematochezia or melena.  Eats at 10-11 pm every night, beans, potato chips, every night.  Hemorrhoids are flared. Saw [facility]S for treatment over a year ago, did not f/u.  "I do not feel like doing anything for myself, I feel like I need sometime for my nerves since my son got killed"  "I haven drinking about 2 beers every day for the past month".    Interval history 3/2/2020  Tried Remeron for two days. Made her "feel not good". Then " stopped. She denies suicidal ideation with that  She was H pylori positive diagnosed at our last visit.  I regionally prescribed Biaxin triple therapy but she did not tolerate that.  I then prescribed Pylera therapy and she did complete this.  Has been off abx and reflux medications within the appropriate time frame  She is still having the abdominal pain.  While on the Pylera therapy the abdominal pain resolved but once the Pylera was completed her pain returned.  She did not follow up with psych yet.  A referral to psych was placed by a provider that works and breast surgery.  I did inquire about this.  Patient reports they did call her but for whatever reason an appointment has not been set up yet.  Did have a little constipation, but started metamucil and its works well for her.  She is about to get back on her iron  Just had the flu a couple weeks ago, treated with tamiflu.  Fell saturday and she reports she is in pain on her right side    ROS:  Constitutional: No fevers, chills, +weight loss  ENT: No allergies  CV: No chest pain  Pulm: No cough, + shortness of breath  Ophtho: No vision changes  GI: see HPI  Derm: No rash  Heme: No lymphadenopathy, No bruising  MSK: + arthritis, +R knee pain  : No dysuria, No hematuria  Endo: No hot or cold intolerance  Neuro: No syncope, No seizure  Psych: + anxiety, + depression    Medical History:  has a past medical history of Anxiety, Arthritis, COPD (chronic obstructive pulmonary disease), Depression, GERD (gastroesophageal reflux disease), and Hypertension.    Surgical History:  has a past surgical history that includes Colonoscopy (N/A, 12/6/2018).    Family History: family history includes Cancer in her brother and mother; Diabetes in her maternal uncle..     Social History:  reports that she quit smoking about 24 years ago. She has never used smokeless tobacco. She reports that she drinks alcohol. She reports that she does not use drugs.    Review of patient's  allergies indicates:  No Known Allergies    Current Outpatient Medications on File Prior to Visit   Medication Sig Dispense Refill    acyclovir (ZOVIRAX) 400 MG tablet Take by mouth 2 (two) times daily.      albuterol (PROVENTIL/VENTOLIN HFA) 90 mcg/actuation inhaler Inhale 2 puffs into the lungs.      aspirin (ECOTRIN) 81 MG EC tablet Take 81 mg by mouth once daily.      cyproheptadine (PERIACTIN) 4 mg tablet Take 4 mg by mouth 3 (three) times daily as needed.      diclofenac sodium (VOLTAREN) 1 % Gel Apply 2 g topically 4 (four) times daily. For breast pain 1 Tube 1    ferrous sulfate 325 (65 FE) MG EC tablet Take 325 mg by mouth 3 (three) times daily with meals.        hydrochlorothiazide (HYDRODIURIL) 25 MG tablet Take 25 mg by mouth once daily.      ibuprofen (ADVIL,MOTRIN) 800 MG tablet Take 1 tablet (800 mg total) by mouth 3 (three) times daily. 30 tablet 0    methylPREDNISolone (MEDROL DOSEPACK) 4 mg tablet use as directed 1 Package 0    mirtazapine (REMERON) 7.5 MG Tab Take 1 tablet (7.5 mg total) by mouth every evening. 30 tablet 3    mupirocin (BACTROBAN) 2 % ointment Apply to affected area 3 times daily 22 g 0    oxybutynin (DITROPAN-XL) 10 MG 24 hr tablet Take 10 mg by mouth once daily.  11    potassium chloride SA (K-DUR,KLOR-CON) 20 MEQ tablet TAKE 1 TABLET (20 MEQ) BY ORAL ROUTE ONCE DAILY WITH FOOD  0    potassium chloride SA (K-DUR,KLOR-CON) 20 MEQ tablet TAKE 1 TABLET (20 MEQ) BY ORAL ROUTE ONCE DAILY WITH FOOD      PROAIR HFA 90 mcg/actuation inhaler Inhale 2 puffs into the lungs every 4 (four) hours as needed.  3    sucralfate (CARAFATE) 1 gram tablet Take 1 tablet (1 g total) by mouth 4 (four) times daily. Separate from other meds by 1 hr. 60 tablet 1    VITAMIN D3 1,000 unit capsule Take by mouth once daily.  2    acyclovir (ZOVIRAX) 400 MG tablet Take by mouth.      aspirin (ECOTRIN) 81 MG EC tablet Take 81 mg by mouth.      cyproheptadine (PERIACTIN) 4 mg tablet Take 4 mg  "by mouth.      ferrous sulfate 325 (65 FE) MG EC tablet Take 325 mg by mouth.      hydroCHLOROthiazide (HYDRODIURIL) 25 MG tablet Take 25 mg by mouth.      mirabegron (MYRBETRIQ) 25 mg Tb24 ER tablet Take 1 tablet (25 mg total) by mouth once daily. 30 tablet 11    omeprazole (PRILOSEC) 40 MG capsule Take 40 mg by mouth once daily.      omeprazole (PRILOSEC) 40 MG capsule Take 40 mg by mouth.      sodium,potassium,mag sulfates (SUPREP BOWEL PREP KIT) 17.5-3.13-1.6 gram SolR Please dispense as directed/ 1 kit 1 Bottle 0    sucralfate (CARAFATE) 1 gram tablet Take 1 g by mouth.       No current facility-administered medications on file prior to visit.          Objective Findings:    Vital Signs:  /79   Pulse 89   Ht 5' 2" (1.575 m)   Wt 62.8 kg (138 lb 7.2 oz)   BMI 25.32 kg/m²   Body mass index is 25.32 kg/m².    Physical Exam:  General Appearance: Well appearing in no acute distress. Tearful  Head:   Normocephalic, without obvious abnormality  Eyes:    No scleral icterus  ENT: Neck supple  Extremities: No edema  Skin: No rash, no jaundice  Neurologic: AAO x 3,+ anxious      Labs:  Lab Results   Component Value Date    WBC 6.47 04/11/2018    HGB 13.9 04/11/2018    HCT 41.7 04/11/2018     04/11/2018    ALT 17 04/11/2018    AST 15 04/11/2018     04/11/2018    K 3.2 (L) 04/11/2018     04/11/2018    CREATININE 0.8 04/11/2018    BUN 12 04/11/2018    CO2 29 04/11/2018    HGBA1C 5.6 04/26/2018       Imaging reviewed:  04/26/2018 CT abdomen and pelvis with and without contrast.  Endoscopy: reviewed    4/2018 EGD Normal examined duodenum. Biopsied.                        - Erythematous mucosa in the antrum and prepyloric                         region of the stomach. Biopsied. Normal bx                        - 2 cm hiatal hernia.    12/2018 Colonoscopy   - One small cecum polyp was resected and retrieved.                        - Medium sized internal hemorrhoids. REPEAT 5 " yrs  Assessment:  Ms. Mares is a 59 y.o. BF:  1. H. pylori infection    2. Anxiety and depression    3. Upper abdominal pain    4. Weight loss         Patient tearful during exam and discussed many life stressors that she reports she knows knows are aggravating her abdominal pain. She only tried remeron twice despite my prevoius recommendation of consistent use for 2-4 weeks.  She states did not make her feel well.  Denies suicidal ideation or thoughts of harming others while taking it for 2 days.  She is open to giving it another chance and we did discuss a can take 2-4 weeks to see any improvement in her symptoms.  We also did discuss her negative workup in the past and how she would benefit from meeting with psych.  There was referral placed in december.  Patient is unable to give me a reason as to why that appointment has not been made yet.  Patient is concerned she has cancer due to her weight loss.  She did recently have a viral illness.  Urgent care gave her Tamiflu, but she did test negative for the flu.  If below recommendations are unremarkable likely functional in nature.     Recommendations:  1.  Continue omeprazole and Carafate after stool is turned in to check for H pylori eradication  2.  FODMAP diet  3.  Call psych for an appointment  4.. Ct scan and EGD for evaluation of the weight loss.  5.. Try tylenol instead of NSAID medications    F/u pending orders    Order summary:  Orders Placed This Encounter    CT Abdomen Pelvis With Contrast    H. pylori antigen, stool    Creatinine, serum    Case request GI: EGD (ESOPHAGOGASTRODUODENOSCOPY)     40 MINUTES TOTAL FACE TO FACE >50% SPENT IN COUNSELING AND COORDINATION OF CARE     Thank you so much for allowing me to participate in the care of Wen Mares    Lupis Gutierrez, JURGEN

## 2020-03-02 NOTE — PATIENT INSTRUCTIONS
After you turn in your stool for the h. Pylori then you may start taking omeprazole.    Start taking Remeron every sing day for at least 2-4 weeks in the evening as it can take 2-4 weeks to reach an affect.

## 2020-03-03 ENCOUNTER — LAB VISIT (OUTPATIENT)
Dept: LAB | Facility: HOSPITAL | Age: 60
End: 2020-03-03
Payer: MEDICARE

## 2020-03-03 DIAGNOSIS — A04.8 H. PYLORI INFECTION: ICD-10-CM

## 2020-03-03 PROCEDURE — 87338 HPYLORI STOOL AG IA: CPT

## 2020-03-06 ENCOUNTER — TELEPHONE (OUTPATIENT)
Dept: GASTROENTEROLOGY | Facility: CLINIC | Age: 60
End: 2020-03-06

## 2020-03-06 NOTE — TELEPHONE ENCOUNTER
MA contacted pt back to let her know Lupis is out of the office for today . MA also explained pt that her other lab results are in but MA is unable to give her that result. Pt verbalized understanding . MA explained to pt that her stool results does take a few days, but MA will notify Lupis other other result. Pt verbalized understanding that Lupis will return on Monday.                   ----- Message from Karine Arias sent at 3/6/2020 11:45 AM CST -----  Pt called for lab results from 3/2/20 and 3/3/20 call back # [phone]

## 2020-03-06 NOTE — TELEPHONE ENCOUNTER
----- Message from Karine Arias sent at 3/6/2020 11:45 AM CST -----  Pt called for lab results from 3/2/20 and 3/3/20 call back # [phone]

## 2020-03-07 ENCOUNTER — HOSPITAL ENCOUNTER (OUTPATIENT)
Dept: RADIOLOGY | Facility: HOSPITAL | Age: 60
Discharge: HOME | End: 2020-03-07
Attending: NURSE PRACTITIONER
Payer: MEDICARE

## 2020-03-07 DIAGNOSIS — F41.9 ANXIETY AND DEPRESSION: ICD-10-CM

## 2020-03-07 DIAGNOSIS — A04.8 H. PYLORI INFECTION: ICD-10-CM

## 2020-03-07 DIAGNOSIS — R63.4 WEIGHT LOSS: ICD-10-CM

## 2020-03-07 DIAGNOSIS — R10.10 UPPER ABDOMINAL PAIN: ICD-10-CM

## 2020-03-07 DIAGNOSIS — F32.A ANXIETY AND DEPRESSION: ICD-10-CM

## 2020-03-07 LAB — H PYLORI AG STL QL IA: NOT DETECTED

## 2020-03-07 PROCEDURE — 25500020 PHARM REV CODE 255: Performed by: NURSE PRACTITIONER

## 2020-03-07 PROCEDURE — 74177 CT ABD & PELVIS W/CONTRAST: CPT | Mod: TC

## 2020-03-07 PROCEDURE — 74177 CT ABDOMEN PELVIS WITH CONTRAST: ICD-10-PCS | Mod: 26,,, | Performed by: RADIOLOGY

## 2020-03-07 PROCEDURE — 74177 CT ABD & PELVIS W/CONTRAST: CPT | Mod: 26,,, | Performed by: RADIOLOGY

## 2020-03-07 RX ADMIN — IOHEXOL 15 ML: 350 INJECTION, SOLUTION INTRAVENOUS at 12:03

## 2020-03-07 RX ADMIN — IOHEXOL 75 ML: 350 INJECTION, SOLUTION INTRAVENOUS at 02:03

## 2020-03-09 ENCOUNTER — TELEPHONE (OUTPATIENT)
Dept: GASTROENTEROLOGY | Facility: CLINIC | Age: 60
End: 2020-03-09

## 2020-03-09 NOTE — TELEPHONE ENCOUNTER
"  Per KATHRYN Gutierrez informed patient she could have 1-2 capfuls of miralax per day.  She verbalized understanding    Spoke with patient and advised that I was S Matt's nurse and what could I help her with.  She is concerned because she hasn't had a normal BM in 4 days.  She said she passed 3 small "white rock" looking stools, then had a normal BM, but says her stools still look white.  We discussed her stress level, which is very high and good toileting practices including not straining to have a BM (she was complaining of hemorrhoids) and if no results after 3-4 minutes to stop trying to force it and to try again later.  She verbalized understanding.  She states she has been on the Remeron for 8 days and that she feels it is beginning to help.  Discussed drinking additional water while she is constipated.  She said she's been eating all the foods that normally make her go to the bathroom but nothing is helping.  She described numerous foods.  Avised that may not be a good idea, that she should stick to a healthy balanced diet.  She thanked me for speaking with her and we hung up.  "

## 2020-03-09 NOTE — TELEPHONE ENCOUNTER
Stool negative for H. Pylori.  CT scan did not show any acute abdominal processes that could be causing her pain. Reinforce the use of the remeron as it is likely a nerve related abdominal pain

## 2020-03-09 NOTE — TELEPHONE ENCOUNTER
MA spoke to pt. Pt wanted to know why cant she ever speak with Lupis. MA explain to pt that Lupis is in full clinic 5 days a week. She went on to explain that her sister directly speaks to her provider. MA explained to pt she wasn't sure how that department operates.       MA called pt to give results per Lupis, Stool negative for H. Pylori.  CT scan did not show any acute abdominal processes that could be causing her pain. Reinforce the use of the remeron as it is likely a nerve related abdominal pain. MA also reinforced to pt the use of the Remeron pt says that's not true.     Pt says it is something wrong with her. She hasn't taken a bowel movement since last Tuesday .  Pt said her stool was white when she turned in her stool sample.      Pt was very rude and I told pt I will have to speak to Lupis in regards to the information that was given                    ----- Message from Makenna Brink sent at 3/9/2020  2:11 PM CDT -----  Contact: self @ [phone]  Pt is calling for her test results from 3-2-20 and 3-3-20.  Pls call.

## 2020-03-10 ENCOUNTER — ANESTHESIA (OUTPATIENT)
Dept: ENDOSCOPY | Facility: HOSPITAL | Age: 60
End: 2020-03-10
Payer: MEDICARE

## 2020-03-10 ENCOUNTER — HOSPITAL ENCOUNTER (OUTPATIENT)
Facility: HOSPITAL | Age: 60
Discharge: HOME | End: 2020-03-10
Attending: INTERNAL MEDICINE | Admitting: INTERNAL MEDICINE
Payer: MEDICARE

## 2020-03-10 ENCOUNTER — ANESTHESIA EVENT (OUTPATIENT)
Dept: ENDOSCOPY | Facility: HOSPITAL | Age: 60
End: 2020-03-10
Payer: MEDICARE

## 2020-03-10 VITALS
HEART RATE: 77 BPM | OXYGEN SATURATION: 98 % | RESPIRATION RATE: 16 BRPM | TEMPERATURE: 98 F | DIASTOLIC BLOOD PRESSURE: 65 MMHG | HEIGHT: 62 IN | SYSTOLIC BLOOD PRESSURE: 107 MMHG | BODY MASS INDEX: 25.76 KG/M2 | WEIGHT: 140 LBS

## 2020-03-10 DIAGNOSIS — R10.13 EPIGASTRIC PAIN: ICD-10-CM

## 2020-03-10 DIAGNOSIS — R10.12 LUQ PAIN: Primary | ICD-10-CM

## 2020-03-10 PROCEDURE — 37000009 HC ANESTHESIA EA ADD 15 MINS: Performed by: INTERNAL MEDICINE

## 2020-03-10 PROCEDURE — 63600175 PHARM REV CODE 636 W HCPCS: Performed by: INTERNAL MEDICINE

## 2020-03-10 PROCEDURE — 43239 PR EGD, FLEX, W/BIOPSY, SGL/MULTI: ICD-10-PCS | Mod: ,,, | Performed by: INTERNAL MEDICINE

## 2020-03-10 PROCEDURE — E9220 PRA ENDO ANESTHESIA: HCPCS | Mod: ,,, | Performed by: NURSE ANESTHETIST, CERTIFIED REGISTERED

## 2020-03-10 PROCEDURE — 37000008 HC ANESTHESIA 1ST 15 MINUTES: Performed by: INTERNAL MEDICINE

## 2020-03-10 PROCEDURE — 88305 TISSUE EXAM BY PATHOLOGIST: CPT | Performed by: PATHOLOGY

## 2020-03-10 PROCEDURE — 63600175 PHARM REV CODE 636 W HCPCS: Performed by: NURSE ANESTHETIST, CERTIFIED REGISTERED

## 2020-03-10 PROCEDURE — 88305 TISSUE EXAM BY PATHOLOGIST: CPT | Mod: 26,,, | Performed by: PATHOLOGY

## 2020-03-10 PROCEDURE — 00731 ANES UPR GI NDSC PX NOS: CPT | Performed by: INTERNAL MEDICINE

## 2020-03-10 PROCEDURE — 88305 TISSUE EXAM BY PATHOLOGIST: ICD-10-PCS | Mod: 26,,, | Performed by: PATHOLOGY

## 2020-03-10 PROCEDURE — 43239 EGD BIOPSY SINGLE/MULTIPLE: CPT | Mod: ,,, | Performed by: INTERNAL MEDICINE

## 2020-03-10 PROCEDURE — 43239 EGD BIOPSY SINGLE/MULTIPLE: CPT | Performed by: INTERNAL MEDICINE

## 2020-03-10 PROCEDURE — 27201012 HC FORCEPS, HOT/COLD, DISP: Performed by: INTERNAL MEDICINE

## 2020-03-10 PROCEDURE — E9220 PRA ENDO ANESTHESIA: ICD-10-PCS | Mod: ,,, | Performed by: NURSE ANESTHETIST, CERTIFIED REGISTERED

## 2020-03-10 RX ORDER — SODIUM CHLORIDE 9 MG/ML
INJECTION, SOLUTION INTRAVENOUS CONTINUOUS
Status: DISCONTINUED | OUTPATIENT
Start: 2020-03-10 | End: 2020-03-10 | Stop reason: HOSPADM

## 2020-03-10 RX ORDER — PROPOFOL 10 MG/ML
VIAL (ML) INTRAVENOUS
Status: DISCONTINUED | OUTPATIENT
Start: 2020-03-10 | End: 2020-03-10

## 2020-03-10 RX ORDER — PROPOFOL 10 MG/ML
VIAL (ML) INTRAVENOUS CONTINUOUS PRN
Status: DISCONTINUED | OUTPATIENT
Start: 2020-03-10 | End: 2020-03-10

## 2020-03-10 RX ORDER — LIDOCAINE HYDROCHLORIDE 20 MG/ML
INJECTION INTRAVENOUS
Status: DISCONTINUED | OUTPATIENT
Start: 2020-03-10 | End: 2020-03-10

## 2020-03-10 RX ADMIN — PROPOFOL 150 MCG/KG/MIN: 10 INJECTION, EMULSION INTRAVENOUS at 03:03

## 2020-03-10 RX ADMIN — PROPOFOL 100 MG: 10 INJECTION, EMULSION INTRAVENOUS at 03:03

## 2020-03-10 RX ADMIN — SODIUM CHLORIDE: 0.9 INJECTION, SOLUTION INTRAVENOUS at 03:03

## 2020-03-10 RX ADMIN — LIDOCAINE HYDROCHLORIDE 100 MG: 20 INJECTION, SOLUTION INTRAVENOUS at 03:03

## 2020-03-10 NOTE — ANESTHESIA POSTPROCEDURE EVALUATION
Anesthesia Post Evaluation    Patient: Wen Mares    Procedure(s) Performed: Procedure(s) (LRB):  EGD (ESOPHAGOGASTRODUODENOSCOPY) (N/A)    Final Anesthesia Type: general    Patient location during evaluation: PACU  Patient participation: Yes- Able to Participate  Level of consciousness: awake and alert  Post-procedure vital signs: reviewed and stable  Pain management: adequate  Airway patency: patent    PONV status at discharge: No PONV  Anesthetic complications: no      Cardiovascular status: blood pressure returned to baseline  Respiratory status: spontaneous ventilation and room air  Hydration status: euvolemic  Follow-up not needed.          Vitals Value Taken Time   /66 3/10/2020  3:30 PM   Temp 36.7 °C (98 °F) 3/10/2020  3:30 PM   Pulse 80 3/10/2020  3:30 PM   Resp 17 3/10/2020  3:30 PM   SpO2 100 % 3/10/2020  3:30 PM         No case tracking events are documented in the log.      Pain/Anyi Score: Anyi Score: 10 (3/10/2020  3:30 PM)

## 2020-03-10 NOTE — TRANSFER OF CARE
"Anesthesia Transfer of Care Note    Patient: Wen Mares    Procedure(s) Performed: Procedure(s) (LRB):  EGD (ESOPHAGOGASTRODUODENOSCOPY) (N/A)    Patient location: GI    Anesthesia Type: general    Transport from OR: Transported from OR on 6-10 L/min O2 by face mask with adequate spontaneous ventilation    Post pain: adequate analgesia    Post assessment: no apparent anesthetic complications and tolerated procedure well    Post vital signs: stable    Level of consciousness: awake and alert    Nausea/Vomiting: no nausea/vomiting    Complications: none    Transfer of care protocol was followed      Last vitals:   Visit Vitals  /80 (BP Location: Left arm, Patient Position: Lying)   Pulse 89   Temp 37.1 °C (98.7 °F) (Temporal)   Resp 12   Ht 5' 2" (1.575 m)   Wt 63.5 kg (140 lb)   SpO2 97%   Breastfeeding? No   BMI 25.61 kg/m²     "

## 2020-03-10 NOTE — ANESTHESIA PREPROCEDURE EVALUATION
03/10/2020  Wen Mares is a 59 y.o., female.  Past Medical History:   Diagnosis Date    Anxiety     Arthritis     COPD (chronic obstructive pulmonary disease)     Depression     GERD (gastroesophageal reflux disease)     Hypertension      Past Surgical History:   Procedure Laterality Date    COLONOSCOPY N/A 12/6/2018    Procedure: COLONOSCOPY;  Surgeon: Endy Moncada MD;  Location: [address];  Service: Endoscopy;  Laterality: N/A;  PM Prep - sm         Anesthesia Evaluation    I have reviewed the Patient Summary Reports.    I have reviewed the Nursing Notes.   I have reviewed the Medications.     Review of Systems  Anesthesia Hx:  No problems with previous Anesthesia    Hematology/Oncology:  Hematology Normal   Oncology Normal     EENT/Dental:EENT/Dental Normal   Cardiovascular:   Hypertension    Pulmonary:   COPD    Renal/:  Renal/ Normal     Hepatic/GI:   GERD    Musculoskeletal:  Musculoskeletal Normal    Neurological:  Neurology Normal    Endocrine:  Endocrine Normal    Dermatological:  Skin Normal    Psych:   Psychiatric History          Physical Exam  General:  Well nourished    Airway/Jaw/Neck:  Airway Findings: Mouth Opening: Normal Tongue: Normal  Mallampati: II  TM Distance: Normal, at least 6 cm        Eyes/Ears/Nose:  EYES/EARS/NOSE FINDINGS: Normal   Dental:  DENTAL FINDINGS: Normal   Chest/Lungs:  Chest/Lungs Clear    Heart/Vascular:  Heart Findings: Normal Heart murmur: negative Vascular Findings: Normal    Abdomen:  Abdomen Findings: Normal    Musculoskeletal:  Musculoskeletal Findings: Normal   Skin:  Skin Findings: Normal    Mental Status:  Mental Status Findings: Normal        Anesthesia Plan  Type of Anesthesia, risks & benefits discussed:  Anesthesia Type:  general  Patient's Preference: general  Intra-op Monitoring Plan: standard ASA monitors  Intra-op  Monitoring Plan Comments:   Post Op Pain Control Plan: multimodal analgesia  Post Op Pain Control Plan Comments:   Induction:   IV  Beta Blocker:  Patient is not currently on a Beta-Blocker (No further documentation required).       Informed Consent: Patient understands risks and agrees with Anesthesia plan.  Questions answered. Anesthesia consent signed with patient.  ASA Score: 3     Day of Surgery Review of History & Physical: I have interviewed and examined the patient. I have reviewed the patient's H&P dated:  There are no significant changes.  H&P update referred to the provider.         Ready For Surgery From Anesthesia Perspective.

## 2020-03-10 NOTE — [employer] PATIENT INSTRUCTIONS
Discharge Summary/Instructions after an Endoscopic Procedure  Patient Name: Wen Childs  Patient MRN: [id number]  Patient YOB: 1960  Tuesday, March 10, 2020  Derrell Kovacs MD  RESTRICTIONS:  During your procedure today, you received medications for sedation.  These   medications may affect your judgment, balance and coordination.  Therefore,   for 24 hours, you have the following restrictions:   - DO NOT drive a car, operate machinery, make legal/financial decisions,   sign important papers or drink alcohol.    ACTIVITY:  Today: no heavy lifting, straining or running due to procedural   sedation/anesthesia.  The following day: return to full activity including work.  DIET:  Eat and drink normally unless instructed otherwise.     TREATMENT FOR COMMON SIDE EFFECTS:  - Mild abdominal pain, nausea, belching, bloating or excessive gas:  rest,   eat lightly and use a heating pad.  - Sore Throat: treat with throat lozenges and/or gargle with warm salt   water.  - Because air was used during the procedure, expelling large amounts of air   from your rectum or belching is normal.  - If a bowel prep was taken, you may not have a bowel movement for 1-3 days.    This is normal.  SYMPTOMS TO WATCH FOR AND REPORT TO YOUR PHYSICIAN:  1. Abdominal pain or bloating, other than gas cramps.  2. Chest pain.  3. Back pain.  4. Signs of infection such as: chills or fever occurring within 24 hours   after the procedure.  5. Rectal bleeding, which would show as bright red, maroon, or black stools.   (A tablespoon of blood from the rectum is not serious, especially if   hemorrhoids are present.)  6. Vomiting.  7. Weakness or dizziness.  GO DIRECTLY TO THE NEAREST EMERGENCY ROOM IF YOU HAVE ANY OF THE FOLLOWING:      Difficulty breathing              Chills and/or fever over 101 F   Persistent vomiting and/or vomiting blood   Severe abdominal pain   Severe chest pain   Black, tarry stools   Bleeding- more than one  tablespoon   Any other symptom or condition that you feel may need urgent attention  Your doctor recommends these additional instructions:  If any biopsies were taken, your doctors clinic will contact you in 1 to 2   weeks with any results.  - Discharge patient to home.   - Resume previous diet today.   - Continue present medications.   - Await pathology results.   - Return to referring physician as previously scheduled.  For questions, problems or results please call your physician - Derrell Kovacs MD at Work:  [phone].  [facility], EMERGENCY ROOM PHONE NUMBER: [phone]  IF A COMPLICATION OR EMERGENCY SITUATION ARISES AND YOU ARE UNABLE TO REACH   YOUR PHYSICIAN - GO DIRECTLY TO THE EMERGENCY ROOM.  Derrell Kovacs MD  3/10/2020 3:27:56 PM  This report has been verified and signed electronically.  [employer]

## 2020-03-20 ENCOUNTER — TELEPHONE (OUTPATIENT)
Dept: GASTROENTEROLOGY | Facility: CLINIC | Age: 60
End: 2020-03-20

## 2020-03-20 LAB
FINAL PATHOLOGIC DIAGNOSIS: NORMAL
GROSS: NORMAL

## 2020-03-20 NOTE — TELEPHONE ENCOUNTER
Pt aware and understanding that the biopsies from her EGD were fine    ----- Message from Derrell Kovacs MD sent at 3/20/2020  1:25 PM CDT -----  Let her know that the bxs from her EGD were fine. Thanks.

## 2020-07-15 NOTE — TELEPHONE ENCOUNTER
Will call.   Rhofade Counseling: Rhofade is a topical medication which can decrease superficial blood flow where applied. Side effects are uncommon and include stinging, redness and allergic reactions.

## 2020-12-22 ENCOUNTER — TELEPHONE (OUTPATIENT)
Dept: ORTHOPEDICS | Facility: CLINIC | Age: 60
End: 2020-12-22

## 2020-12-27 ENCOUNTER — HOSPITAL ENCOUNTER (EMERGENCY)
Facility: OTHER | Age: 60
Discharge: HOME | End: 2020-12-27
Attending: EMERGENCY MEDICINE
Payer: MEDICARE

## 2020-12-27 VITALS
SYSTOLIC BLOOD PRESSURE: 133 MMHG | TEMPERATURE: 99 F | OXYGEN SATURATION: 98 % | BODY MASS INDEX: 25.76 KG/M2 | DIASTOLIC BLOOD PRESSURE: 80 MMHG | RESPIRATION RATE: 18 BRPM | WEIGHT: 140 LBS | HEIGHT: 62 IN | HEART RATE: 87 BPM

## 2020-12-27 DIAGNOSIS — F41.9 ANXIETY: ICD-10-CM

## 2020-12-27 DIAGNOSIS — I10 ESSENTIAL HYPERTENSION: Primary | ICD-10-CM

## 2020-12-27 PROCEDURE — 99283 EMERGENCY DEPT VISIT LOW MDM: CPT

## 2020-12-27 RX ORDER — HYDROXYZINE HYDROCHLORIDE 25 MG/1
25 TABLET, FILM COATED ORAL
Qty: 20 TABLET | Refills: 0 | Status: SHIPPED | OUTPATIENT
Start: 2020-12-27

## 2020-12-27 NOTE — ED PROVIDER NOTES
"Encounter Date: 12/27/2020       History     Chief Complaint   Patient presents with    Hypertension     x 2 days.  Patient states takes medications as prescribed.  Patient denies headache/blurred vision at this time.  Patient states 145/95 at home     This is a 60 year old female with past medical history of hypertension and anxiety who presents with a complaint of elevated BP reading for the last 2 days.  Patient reports that she is concerned as her blood pressure readings have been 140s/90s.  She states that she is most concerned about the "bottom number" as it has been as high as 96 even though she has been taking her medications as prescribed.  She denies fever, chills, headache, visual disturbances, chest pain and shortness of breath.  She also reports that she has had anxiety since her son was killed 7 years ago and states that nobody will put her on medication.  She states that she was started on antidepressant/antianxiety medications before but it caused constipation so she stopped taking it.  She states "I have been told to go to the mental institution but I am not crazy."  She denies SI.        Review of patient's allergies indicates:  No Known Allergies  Past Medical History:   Diagnosis Date    Anxiety     Arthritis     COPD (chronic obstructive pulmonary disease)     Depression     GERD (gastroesophageal reflux disease)     Hypertension      Past Surgical History:   Procedure Laterality Date    COLONOSCOPY N/A 12/6/2018    Procedure: COLONOSCOPY;  Surgeon: Endy Moncada MD;  Location: [address]);  Service: Endoscopy;  Laterality: N/A;  PM Prep - sm    ESOPHAGOGASTRODUODENOSCOPY N/A 3/10/2020    Procedure: EGD (ESOPHAGOGASTRODUODENOSCOPY);  Surgeon: Derrell Kovacs MD;  Location: [address]);  Service: Endoscopy;  Laterality: N/A;     Family History   Problem Relation Age of Onset    Cancer Mother     Cancer Brother     Diabetes Maternal Uncle     Colon cancer Neg " Hx     Stomach cancer Neg Hx     Inflammatory bowel disease Neg Hx      Social History     Tobacco Use    Smoking status: Former Smoker     Quit date: 1995     Years since quittin.0    Smokeless tobacco: Never Used   Substance Use Topics    Alcohol use: Yes     Comment: weekends    Drug use: No     Review of Systems   Constitutional: Negative for chills and fever.   HENT: Negative for sore throat and voice change.    Eyes: Negative for photophobia, redness and visual disturbance.   Respiratory: Negative for cough and shortness of breath.    Cardiovascular: Negative for chest pain.   Gastrointestinal: Negative for abdominal pain, nausea and vomiting.   Genitourinary: Negative for dysuria and flank pain.   Musculoskeletal: Negative for back pain and gait problem.   Skin: Negative for pallor and rash.   Neurological: Negative for dizziness, speech difficulty, weakness, numbness and headaches.   Hematological: Does not bruise/bleed easily.   Psychiatric/Behavioral: Positive for sleep disturbance. Negative for confusion, hallucinations, self-injury and suicidal ideas. The patient is nervous/anxious.        Physical Exam     Initial Vitals [20 1319]   BP Pulse Resp Temp SpO2   128/75 90 18 98.6 °F (37 °C) 95 %      MAP       --         Physical Exam    Constitutional: She appears well-developed and well-nourished. She is cooperative.  Non-toxic appearance. She appears distressed (Emotional, tearful).   HENT:   Head: Normocephalic and atraumatic.   Eyes: Conjunctivae and EOM are normal. Pupils are equal, round, and reactive to light. No scleral icterus.   Neck: Normal range of motion.   Cardiovascular: Normal rate, regular rhythm and normal heart sounds.   Pulmonary/Chest: Breath sounds normal. No respiratory distress.   Abdominal: Soft. Bowel sounds are normal. She exhibits no distension. There is no abdominal tenderness.   Musculoskeletal: Normal range of motion. No tenderness or edema.    Neurological: She is alert and oriented to person, place, and time. She has normal strength. No sensory deficit. GCS score is 15. GCS eye subscore is 4. GCS verbal subscore is 5. GCS motor subscore is 6.   Skin: Skin is warm and dry. No rash noted. No erythema.   Psychiatric: Her speech is normal. Thought content normal. Her mood appears anxious.         ED Course   Procedures  Labs Reviewed - No data to display       Imaging Results    None                APC / Resident Notes:   Emergent evaluation of 60 year old female with past medical history of hypertension and anxiety who presents with a complaint of elevated BP reading for the last 2 days.  Patient is afebrile, not toxic appearing and hemodynamically stable.  Patient is tearful and appears anxious.  Patient's physical exam is grossly unremarkable. Patient's blood pressure is 130/80 here in the ED. Counseled patient that there are no signs on history or physical exam to indicate endorgan damage representing hypertensive emergency requiring further workup in the ED at this time.  As patient expressed frustration with her current PCP, patient given contact information of another clinics where she can establish PCP care if she chooses.  Patient given hydroxyzine to address anxiety and instructed to follow-up with primary care regarding further management of anxiety and continued hypertension management. VSS throughout ED course and patient stable for discharge. Patient and patient's daughter educated on on signs and symptoms to monitor for and when to return to ED. Patient verbalized understanding agrees with treatment plan. All questions and concerns addressed.                             Clinical Impression:     ICD-10-CM ICD-9-CM   1. Essential hypertension  I10 401.9   2. Anxiety  F41.9 300.00                          ED Disposition Condition    Discharge Stable        ED Prescriptions     Medication Sig Dispense Start Date End Date Auth. Provider     hydrOXYzine HCL (ATARAX) 25 MG tablet Take 1 tablet (25 mg total) by mouth every 6 to 8 hours as needed for Itching. 20 tablet 12/27/2020  Polly Eason NP        Follow-up Information     Follow up With Specialties Details Why Contact Info    [facility]  for BP management and to adress anxiety [address] [facility] [address]  [phone]      [facility]   if you want ot try a differnt PCP [address] [facility] [address]                                       oPlly Eason NP  12/27/20 8627

## 2020-12-28 NOTE — ED TRIAGE NOTES
Pt to ED c/o HTN x2 days. Pt reports taking BP at home with highest BP being 146/104 yesterday. Pt denies headache, weakness, lightheadedness, dizziness, CP, or SOB. Pt states she is just worried about her BP. BP normal in triage. Respirations even/unlabored, NADN.

## 2021-01-07 DIAGNOSIS — M25.551 RIGHT HIP PAIN: Primary | ICD-10-CM

## 2021-01-13 ENCOUNTER — HOSPITAL ENCOUNTER (EMERGENCY)
Facility: HOSPITAL | Age: 61
Discharge: HOME | End: 2021-01-13
Attending: EMERGENCY MEDICINE
Payer: MEDICARE

## 2021-01-13 VITALS
SYSTOLIC BLOOD PRESSURE: 117 MMHG | BODY MASS INDEX: 25.76 KG/M2 | DIASTOLIC BLOOD PRESSURE: 82 MMHG | RESPIRATION RATE: 16 BRPM | HEIGHT: 62 IN | OXYGEN SATURATION: 99 % | HEART RATE: 92 BPM | WEIGHT: 140 LBS | TEMPERATURE: 99 F

## 2021-01-13 DIAGNOSIS — R11.0 NAUSEA: ICD-10-CM

## 2021-01-13 DIAGNOSIS — R19.7 DIARRHEA, UNSPECIFIED TYPE: Primary | ICD-10-CM

## 2021-01-13 LAB
BUN SERPL-MCNC: 13 MG/DL (ref 6–30)
CHLORIDE SERPL-SCNC: 103 MMOL/L (ref 95–110)
CREAT SERPL-MCNC: 0.7 MG/DL (ref 0.5–1.4)
CTP QC/QA: YES
GLUCOSE SERPL-MCNC: 136 MG/DL (ref 70–110)
HCT VFR BLD CALC: 43 %PCV (ref 36–54)
INFLUENZA A, MOLECULAR: NEGATIVE
INFLUENZA B, MOLECULAR: NEGATIVE
POC IONIZED CALCIUM: 1.44 MMOL/L (ref 1.06–1.42)
POC TCO2 (MEASURED): 28 MMOL/L (ref 23–29)
POTASSIUM BLD-SCNC: 3.2 MMOL/L (ref 3.5–5.1)
SAMPLE: ABNORMAL
SARS-COV-2 RDRP RESP QL NAA+PROBE: NEGATIVE
SODIUM BLD-SCNC: 142 MMOL/L (ref 136–145)
SPECIMEN SOURCE: NORMAL

## 2021-01-13 PROCEDURE — 87502 INFLUENZA DNA AMP PROBE: CPT

## 2021-01-13 PROCEDURE — U0002 COVID-19 LAB TEST NON-CDC: HCPCS | Performed by: EMERGENCY MEDICINE

## 2021-01-13 PROCEDURE — 99284 EMERGENCY DEPT VISIT MOD MDM: CPT | Mod: 25

## 2021-01-13 PROCEDURE — 63600175 PHARM REV CODE 636 W HCPCS: Performed by: EMERGENCY MEDICINE

## 2021-01-13 PROCEDURE — 96374 THER/PROPH/DIAG INJ IV PUSH: CPT

## 2021-01-13 PROCEDURE — 99284 EMERGENCY DEPT VISIT MOD MDM: CPT | Mod: CS,,, | Performed by: EMERGENCY MEDICINE

## 2021-01-13 PROCEDURE — 80047 BASIC METABLC PNL IONIZED CA: CPT

## 2021-01-13 PROCEDURE — 25000003 PHARM REV CODE 250: Performed by: EMERGENCY MEDICINE

## 2021-01-13 PROCEDURE — 99284 PR EMERGENCY DEPT VISIT,LEVEL IV: ICD-10-PCS | Mod: CS,,, | Performed by: EMERGENCY MEDICINE

## 2021-01-13 RX ORDER — POTASSIUM CHLORIDE 750 MG/1
40 CAPSULE, EXTENDED RELEASE ORAL
Status: COMPLETED | OUTPATIENT
Start: 2021-01-13 | End: 2021-01-13

## 2021-01-13 RX ORDER — POTASSIUM CHLORIDE 750 MG/1
10 CAPSULE, EXTENDED RELEASE ORAL
Status: DISCONTINUED | OUTPATIENT
Start: 2021-01-13 | End: 2021-01-13

## 2021-01-13 RX ORDER — ONDANSETRON 4 MG/1
4 TABLET, ORALLY DISINTEGRATING ORAL 2 TIMES DAILY
Qty: 10 TABLET | Refills: 0 | Status: SHIPPED | OUTPATIENT
Start: 2021-01-13

## 2021-01-13 RX ORDER — ONDANSETRON 2 MG/ML
4 INJECTION INTRAMUSCULAR; INTRAVENOUS
Status: COMPLETED | OUTPATIENT
Start: 2021-01-13 | End: 2021-01-13

## 2021-01-13 RX ADMIN — ONDANSETRON 4 MG: 2 INJECTION INTRAMUSCULAR; INTRAVENOUS at 12:01

## 2021-01-13 RX ADMIN — POTASSIUM CHLORIDE 40 MEQ: 750 CAPSULE, EXTENDED RELEASE ORAL at 12:01

## 2021-01-13 RX ADMIN — SODIUM CHLORIDE 500 ML: 0.9 INJECTION, SOLUTION INTRAVENOUS at 12:01

## 2021-12-03 DIAGNOSIS — M25.552 BILATERAL HIP PAIN: Primary | ICD-10-CM

## 2021-12-03 DIAGNOSIS — M25.551 BILATERAL HIP PAIN: Primary | ICD-10-CM

## 2021-12-08 ENCOUNTER — OFFICE VISIT (OUTPATIENT)
Dept: SPORTS MEDICINE | Facility: CLINIC | Age: 61
End: 2021-12-08
Payer: MEDICARE

## 2021-12-08 ENCOUNTER — APPOINTMENT (OUTPATIENT)
Dept: RADIOLOGY | Facility: OTHER | Age: 61
End: 2021-12-08
Attending: ORTHOPAEDIC SURGERY
Payer: MEDICARE

## 2021-12-08 VITALS
SYSTOLIC BLOOD PRESSURE: 120 MMHG | HEIGHT: 62 IN | WEIGHT: 140 LBS | DIASTOLIC BLOOD PRESSURE: 80 MMHG | BODY MASS INDEX: 25.76 KG/M2

## 2021-12-08 DIAGNOSIS — M25.511 ACUTE PAIN OF RIGHT SHOULDER: ICD-10-CM

## 2021-12-08 DIAGNOSIS — M70.61 GREATER TROCHANTERIC BURSITIS, RIGHT: ICD-10-CM

## 2021-12-08 DIAGNOSIS — M25.552 BILATERAL HIP PAIN: ICD-10-CM

## 2021-12-08 DIAGNOSIS — G89.29 CHRONIC RIGHT HIP PAIN: Primary | ICD-10-CM

## 2021-12-08 DIAGNOSIS — M25.551 CHRONIC RIGHT HIP PAIN: Primary | ICD-10-CM

## 2021-12-08 DIAGNOSIS — M25.551 BILATERAL HIP PAIN: ICD-10-CM

## 2021-12-08 PROCEDURE — 73521 X-RAY EXAM HIPS BI 2 VIEWS: CPT | Mod: 26,,, | Performed by: RADIOLOGY

## 2021-12-08 PROCEDURE — 73521 XR HIPS BILATERAL 2 VIEW INCL AP PELVIS: ICD-10-PCS | Mod: 26,,, | Performed by: RADIOLOGY

## 2021-12-08 PROCEDURE — 73521 X-RAY EXAM HIPS BI 2 VIEWS: CPT | Mod: TC

## 2021-12-08 PROCEDURE — 99999 PR PBB SHADOW E&M-EST. PATIENT-LVL IV: ICD-10-PCS | Mod: PBBFAC,,, | Performed by: ORTHOPAEDIC SURGERY

## 2021-12-08 PROCEDURE — 20611 PR DRAIN/ASP/INJECT MAJOR JOINT/BURSA W/US GUIDANCE: ICD-10-PCS | Mod: RT,S$GLB,, | Performed by: ORTHOPAEDIC SURGERY

## 2021-12-08 PROCEDURE — 20611 DRAIN/INJ JOINT/BURSA W/US: CPT | Mod: RT,S$GLB,, | Performed by: ORTHOPAEDIC SURGERY

## 2021-12-08 PROCEDURE — 99999 PR PBB SHADOW E&M-EST. PATIENT-LVL IV: CPT | Mod: PBBFAC,,, | Performed by: ORTHOPAEDIC SURGERY

## 2021-12-08 PROCEDURE — 99204 PR OFFICE/OUTPT VISIT, NEW, LEVL IV, 45-59 MIN: ICD-10-PCS | Mod: 25,S$GLB,, | Performed by: ORTHOPAEDIC SURGERY

## 2021-12-08 PROCEDURE — 99204 OFFICE O/P NEW MOD 45 MIN: CPT | Mod: 25,S$GLB,, | Performed by: ORTHOPAEDIC SURGERY

## 2021-12-08 RX ORDER — MELOXICAM 15 MG/1
15 TABLET ORAL DAILY
Qty: 30 TABLET | Refills: 0 | Status: SHIPPED | OUTPATIENT
Start: 2021-12-08 | End: 2022-01-24

## 2021-12-08 RX ORDER — TRIAMCINOLONE ACETONIDE 40 MG/ML
40 INJECTION, SUSPENSION INTRA-ARTICULAR; INTRAMUSCULAR
Status: SHIPPED | OUTPATIENT
Start: 2021-12-08

## 2021-12-09 ENCOUNTER — TELEPHONE (OUTPATIENT)
Dept: SPORTS MEDICINE | Facility: CLINIC | Age: 61
End: 2021-12-09
Payer: MEDICAID

## 2022-05-11 ENCOUNTER — HOSPITAL ENCOUNTER (EMERGENCY)
Facility: HOSPITAL | Age: 62
Discharge: HOME | End: 2022-05-11
Attending: EMERGENCY MEDICINE
Payer: MEDICARE

## 2022-05-11 VITALS
SYSTOLIC BLOOD PRESSURE: 137 MMHG | HEART RATE: 98 BPM | DIASTOLIC BLOOD PRESSURE: 87 MMHG | TEMPERATURE: 99 F | BODY MASS INDEX: 25.61 KG/M2 | WEIGHT: 140 LBS | RESPIRATION RATE: 18 BRPM | OXYGEN SATURATION: 100 %

## 2022-05-11 DIAGNOSIS — R07.9 CHEST PAIN: ICD-10-CM

## 2022-05-11 DIAGNOSIS — J44.9 CHRONIC OBSTRUCTIVE PULMONARY DISEASE, UNSPECIFIED COPD TYPE: Primary | ICD-10-CM

## 2022-05-11 DIAGNOSIS — F41.9 ANXIETY: ICD-10-CM

## 2022-05-11 LAB
ALBUMIN SERPL BCP-MCNC: 4.1 G/DL (ref 3.5–5.2)
ALP SERPL-CCNC: 88 U/L (ref 55–135)
ALT SERPL W/O P-5'-P-CCNC: 19 U/L (ref 10–44)
ANION GAP SERPL CALC-SCNC: 11 MMOL/L (ref 8–16)
AST SERPL-CCNC: 15 U/L (ref 10–40)
BASOPHILS # BLD AUTO: 0.06 K/UL (ref 0–0.2)
BASOPHILS NFR BLD: 0.5 % (ref 0–1.9)
BILIRUB SERPL-MCNC: 0.5 MG/DL (ref 0.1–1)
BNP SERPL-MCNC: 11 PG/ML (ref 0–99)
BUN SERPL-MCNC: 18 MG/DL (ref 8–23)
CALCIUM SERPL-MCNC: 9.2 MG/DL (ref 8.7–10.5)
CHLORIDE SERPL-SCNC: 105 MMOL/L (ref 95–110)
CO2 SERPL-SCNC: 23 MMOL/L (ref 23–29)
CREAT SERPL-MCNC: 0.8 MG/DL (ref 0.5–1.4)
DIFFERENTIAL METHOD: ABNORMAL
EOSINOPHIL # BLD AUTO: 0 K/UL (ref 0–0.5)
EOSINOPHIL NFR BLD: 0.4 % (ref 0–8)
ERYTHROCYTE [DISTWIDTH] IN BLOOD BY AUTOMATED COUNT: 12.7 % (ref 11.5–14.5)
EST. GFR  (AFRICAN AMERICAN): >60 ML/MIN/1.73 M^2
EST. GFR  (NON AFRICAN AMERICAN): >60 ML/MIN/1.73 M^2
GLUCOSE SERPL-MCNC: 107 MG/DL (ref 70–110)
HCT VFR BLD AUTO: 39.9 % (ref 37–48.5)
HGB BLD-MCNC: 13.5 G/DL (ref 12–16)
IMM GRANULOCYTES # BLD AUTO: 0.03 K/UL (ref 0–0.04)
IMM GRANULOCYTES NFR BLD AUTO: 0.3 % (ref 0–0.5)
LYMPHOCYTES # BLD AUTO: 1.2 K/UL (ref 1–4.8)
LYMPHOCYTES NFR BLD: 11.4 % (ref 18–48)
MCH RBC QN AUTO: 28.5 PG (ref 27–31)
MCHC RBC AUTO-ENTMCNC: 33.8 G/DL (ref 32–36)
MCV RBC AUTO: 84 FL (ref 82–98)
MONOCYTES # BLD AUTO: 0.6 K/UL (ref 0.3–1)
MONOCYTES NFR BLD: 5.6 % (ref 4–15)
NEUTROPHILS # BLD AUTO: 8.9 K/UL (ref 1.8–7.7)
NEUTROPHILS NFR BLD: 81.8 % (ref 38–73)
NRBC BLD-RTO: 0 /100 WBC
PLATELET # BLD AUTO: 282 K/UL (ref 150–450)
PMV BLD AUTO: 8.9 FL (ref 9.2–12.9)
POCT GLUCOSE: 119 MG/DL (ref 70–110)
POTASSIUM SERPL-SCNC: 3.9 MMOL/L (ref 3.5–5.1)
PROT SERPL-MCNC: 7.4 G/DL (ref 6–8.4)
RBC # BLD AUTO: 4.74 M/UL (ref 4–5.4)
SODIUM SERPL-SCNC: 139 MMOL/L (ref 136–145)
TROPONIN I SERPL DL<=0.01 NG/ML-MCNC: <0.006 NG/ML (ref 0–0.03)
TROPONIN I SERPL DL<=0.01 NG/ML-MCNC: <0.006 NG/ML (ref 0–0.03)
WBC # BLD AUTO: 10.92 K/UL (ref 3.9–12.7)

## 2022-05-11 PROCEDURE — 82962 GLUCOSE BLOOD TEST: CPT

## 2022-05-11 PROCEDURE — 93005 ELECTROCARDIOGRAM TRACING: CPT

## 2022-05-11 PROCEDURE — 80053 COMPREHEN METABOLIC PANEL: CPT | Performed by: EMERGENCY MEDICINE

## 2022-05-11 PROCEDURE — 83880 ASSAY OF NATRIURETIC PEPTIDE: CPT | Performed by: EMERGENCY MEDICINE

## 2022-05-11 PROCEDURE — 94640 AIRWAY INHALATION TREATMENT: CPT

## 2022-05-11 PROCEDURE — 99285 EMERGENCY DEPT VISIT HI MDM: CPT | Mod: ,,, | Performed by: EMERGENCY MEDICINE

## 2022-05-11 PROCEDURE — 25000242 PHARM REV CODE 250 ALT 637 W/ HCPCS: Performed by: EMERGENCY MEDICINE

## 2022-05-11 PROCEDURE — 94761 N-INVAS EAR/PLS OXIMETRY MLT: CPT

## 2022-05-11 PROCEDURE — 84484 ASSAY OF TROPONIN QUANT: CPT | Mod: 91 | Performed by: EMERGENCY MEDICINE

## 2022-05-11 PROCEDURE — 93010 ELECTROCARDIOGRAM REPORT: CPT | Mod: ,,, | Performed by: INTERNAL MEDICINE

## 2022-05-11 PROCEDURE — 99285 PR EMERGENCY DEPT VISIT,LEVEL V: ICD-10-PCS | Mod: ,,, | Performed by: EMERGENCY MEDICINE

## 2022-05-11 PROCEDURE — 99285 EMERGENCY DEPT VISIT HI MDM: CPT | Mod: 25

## 2022-05-11 PROCEDURE — 85025 COMPLETE CBC W/AUTO DIFF WBC: CPT | Performed by: EMERGENCY MEDICINE

## 2022-05-11 PROCEDURE — 93010 EKG 12-LEAD: ICD-10-PCS | Mod: ,,, | Performed by: INTERNAL MEDICINE

## 2022-05-11 RX ORDER — IPRATROPIUM BROMIDE AND ALBUTEROL SULFATE 2.5; .5 MG/3ML; MG/3ML
3 SOLUTION RESPIRATORY (INHALATION)
Status: COMPLETED | OUTPATIENT
Start: 2022-05-11 | End: 2022-05-11

## 2022-05-11 RX ADMIN — IPRATROPIUM BROMIDE AND ALBUTEROL SULFATE 3 ML: 2.5; .5 SOLUTION RESPIRATORY (INHALATION) at 05:05

## 2022-05-11 NOTE — ED TRIAGE NOTES
The patient is a 61-year-old female who presents to the ED. The patient reports bilateral upper extremity numbness, elevated blood pressure, lightheadedness, and confusion. She reports that she thinks the symptoms may be related to a panic attack due to extreme stress.

## 2022-05-11 NOTE — ED PROVIDER NOTES
Encounter Date: 2022       History     Chief Complaint   Patient presents with    multiple complaints     About 2 hours ago, started feeling funny in head, ears, bilateral arms tingling. Hx anxiety. Didn't eat today except drank a boost around an hour ago     61-year-old female presents with feeling stressed.  She has been under a lot of pressure for last several years.  Her son was killed in a motorcycle accident which is head her feel depressed.  She has to move out of her house and all her belongings are in boxes.  She has not taking time to eat and sometimes gets very stressed.  Today she felt like her COPD was acting up and she felt tingly across her chest both arms and around her lips.  She took her blood pressure medication and came to the emergency department.  She is feeling much better now.  She is here with her daughter who offers her support.  She is not suicidal.        Review of patient's allergies indicates:  No Known Allergies  Past Medical History:   Diagnosis Date    Anxiety     Arthritis     COPD (chronic obstructive pulmonary disease)     Depression     GERD (gastroesophageal reflux disease)     Hypertension      Past Surgical History:   Procedure Laterality Date    COLONOSCOPY N/A 2018    Procedure: COLONOSCOPY;  Surgeon: Endy Moncada MD;  Location: [address]);  Service: Endoscopy;  Laterality: N/A;  PM Prep - sm    ESOPHAGOGASTRODUODENOSCOPY N/A 3/10/2020    Procedure: EGD (ESOPHAGOGASTRODUODENOSCOPY);  Surgeon: Derrell Kovacs MD;  Location: [address]);  Service: Endoscopy;  Laterality: N/A;     Family History   Problem Relation Age of Onset    Cancer Mother     Cancer Brother     Diabetes Maternal Uncle     Colon cancer Neg Hx     Stomach cancer Neg Hx     Inflammatory bowel disease Neg Hx      Social History     Tobacco Use    Smoking status: Former Smoker     Quit date: 1995     Years since quittin.3    Smokeless tobacco:  Never Used   Substance Use Topics    Alcohol use: Yes     Comment: weekends    Drug use: No     Review of Systems   Constitutional: Negative for chills and fever.   HENT: Negative for nosebleeds.    Eyes: Negative for visual disturbance.   Respiratory: Negative for cough.    Cardiovascular: Negative for chest pain.   Gastrointestinal: Negative for vomiting.   Genitourinary: Negative for frequency.   Musculoskeletal: Negative for joint swelling.   Skin: Negative for rash.   Neurological: Negative for numbness.   Hematological: Does not bruise/bleed easily.   Psychiatric/Behavioral: Negative for confusion.       Physical Exam     Initial Vitals [05/11/22 1553]   BP Pulse Resp Temp SpO2   (!) 168/98 104 18 98.4 °F (36.9 °C) 97 %      MAP       --         Physical Exam    Constitutional: She appears well-developed and well-nourished. She is not diaphoretic. No distress.   HENT:   Head: Normocephalic.   Eyes: Conjunctivae are normal. Pupils are equal, round, and reactive to light.   Neck: Neck supple. No JVD present.   Normal range of motion.  Cardiovascular: Normal rate, regular rhythm and normal heart sounds.   No murmur heard.  Pulmonary/Chest: Breath sounds normal. No respiratory distress. She has no wheezes. She has no rales.   Abdominal: Abdomen is soft. Bowel sounds are normal. She exhibits no distension. There is no abdominal tenderness.   Musculoskeletal:         General: No tenderness or edema. Normal range of motion.      Cervical back: Normal range of motion and neck supple.     Neurological: She is alert. She has normal strength. No cranial nerve deficit or sensory deficit.   Skin: Skin is warm. No rash noted.         ED Course   Procedures  Labs Reviewed   CBC W/ AUTO DIFFERENTIAL - Abnormal; Notable for the following components:       Result Value    MPV 8.9 (*)     Gran # (ANC) 8.9 (*)     Gran % 81.8 (*)     Lymph % 11.4 (*)     All other components within normal limits   POCT GLUCOSE - Abnormal;  Notable for the following components:    POCT Glucose 119 (*)     All other components within normal limits   COMPREHENSIVE METABOLIC PANEL   TROPONIN I   TROPONIN I   B-TYPE NATRIURETIC PEPTIDE     EKG Readings: (Independently Interpreted)   Normal sinus rhythm at 92 without acute ischemic changes       Imaging Results          X-Ray Chest PA And Lateral (Final result)  Result time 05/11/22 18:07:25    Final result by David Sloan MD (05/11/22 18:07:25)                 Impression:      1. Bilateral basilar subsegmental atelectasis or scarring.  Coarse interstitial attenuation is similar to the previous exam.  Correlation with any history of COPD/emphysema.  No large focal consolidation.      Electronically signed by: David Sloan MD  Date:    05/11/2022  Time:    18:07             Narrative:    EXAMINATION:  XR CHEST PA AND LATERAL    CLINICAL HISTORY:  Chest Pain;    TECHNIQUE:  PA and lateral views of the chest were performed.    COMPARISON:  07/26/2018    FINDINGS:  The cardiomediastinal silhouette is not enlarged.  There is no pleural effusion.  The trachea is midline.  The lungs are symmetrically expanded bilaterally with coarse interstitial attenuation.  There is bilateral basilar subsegmental atelectasis..  No large focal consolidation seen.  There is no pneumothorax.  The osseous structures are remarkable for degenerative changes noting osteopenia..                                 Medications   albuterol-ipratropium 2.5 mg-0.5 mg/3 mL nebulizer solution 3 mL (3 mLs Nebulization Given 5/11/22 5898)     Medical Decision Making:   Initial Assessment:   Patient with tingling to arms chest and lips with associated anxiety.  I suspect this is stressors from her life.  I think MI is less than likely will do serial troponins.  Highly doubt pulmonary embolus.  Will check basic electrolytes.  Highly doubt stroke.  Clinical Tests:   Lab Tests: Ordered and Reviewed  Radiological Study: Reviewed and  Ordered  Medical Tests: Reviewed and Ordered  ED Management:  Studies were reassuring.  Patient feels completely back to normal after treatment and time in the ED.  I do not believe this acute coronary syndrome.  Highly doubt PE or other emergent medical condition.  Will discharge home in stable condition.                      Clinical Impression:   Final diagnoses:  [F41.9] Anxiety  [R07.9] Chest pain  [J44.9] Chronic obstructive pulmonary disease, unspecified COPD type (Primary)          ED Disposition Condition    Discharge Stable        ED Prescriptions     None        Follow-up Information     Follow up With Specialties Details Why Contact Info    [facility] Schedule an appointment as soon as possible for a visit   [address] [facility] [address]  [phone]      [facility] Emergency Medicine  If symptoms worsen [address]  [facility] [phone]           Mohan Gross MD  05/11/22 1121

## 2022-05-11 NOTE — ED NOTES
ICU  Critical Care APRN Progress Note    NAME: Harshad Mike - ROOM: [address] [place], [place] [address] - MRN: [id number] - Age: 77year old - :12/10/1952    History Of Present Illness:  Harshad Mike. is a 77year old male with PMHx significant for right lung cancer (s/p r Resp tx per RT   comprehensive 14 point review of systems was completed. Pertinent positives and negatives noted in the HPI.     OBJECTIVE  Vitals:  /70   Pulse 88   Temp 99.9 °F (37.7 °C) (Temporal)   Resp 17   Ht 167.6 cm (5' 6\")   Wt 146 lb 13.2 oz (66.6 kg)   Sp related to enteritis. More loculated fluid in the upper abdomen. Moderate free fluid in the pelvis.   Dictated by: Colbert Libman, MD on 5/08/2018 at 7:54     Approved by: Colbert Libman, MD            Xr Chest Ap Portable  (cpt=71045)    Result Date: 5/8/2 lisinopril 10mg as outpatient   - Pt's -160's at this time via arterial line, will restart home BP meds as tolerated     Neuropathy   - Reports chronic neuropathy in bilateral legs  - Gabapentin 800mg, QID at home. Currently being held.      ETOH abu

## 2022-10-03 ENCOUNTER — HOSPITAL ENCOUNTER (EMERGENCY)
Facility: HOSPITAL | Age: 62
Discharge: HOME | End: 2022-10-03
Attending: EMERGENCY MEDICINE
Payer: MEDICARE

## 2022-10-03 VITALS
BODY MASS INDEX: 25.61 KG/M2 | WEIGHT: 140 LBS | DIASTOLIC BLOOD PRESSURE: 81 MMHG | RESPIRATION RATE: 22 BRPM | HEART RATE: 76 BPM | OXYGEN SATURATION: 98 % | TEMPERATURE: 99 F | SYSTOLIC BLOOD PRESSURE: 146 MMHG

## 2022-10-03 DIAGNOSIS — K59.00 CONSTIPATION, UNSPECIFIED CONSTIPATION TYPE: Primary | ICD-10-CM

## 2022-10-03 DIAGNOSIS — R07.9 CHEST PAIN: ICD-10-CM

## 2022-10-03 LAB
ALBUMIN SERPL BCP-MCNC: 4.4 G/DL (ref 3.5–5.2)
ALP SERPL-CCNC: 86 U/L (ref 55–135)
ALT SERPL W/O P-5'-P-CCNC: 16 U/L (ref 10–44)
ANION GAP SERPL CALC-SCNC: 10 MMOL/L (ref 8–16)
AST SERPL-CCNC: 17 U/L (ref 10–40)
BASOPHILS # BLD AUTO: 0.04 K/UL (ref 0–0.2)
BASOPHILS NFR BLD: 0.4 % (ref 0–1.9)
BILIRUB SERPL-MCNC: 0.8 MG/DL (ref 0.1–1)
BILIRUB UR QL STRIP: NEGATIVE
BNP SERPL-MCNC: <10 PG/ML (ref 0–99)
BUN SERPL-MCNC: 20 MG/DL (ref 8–23)
CALCIUM SERPL-MCNC: 9.4 MG/DL (ref 8.7–10.5)
CHLORIDE SERPL-SCNC: 108 MMOL/L (ref 95–110)
CLARITY UR: CLEAR
CO2 SERPL-SCNC: 22 MMOL/L (ref 23–29)
COLOR UR: YELLOW
CREAT SERPL-MCNC: 0.9 MG/DL (ref 0.5–1.4)
DIFFERENTIAL METHOD: ABNORMAL
EOSINOPHIL # BLD AUTO: 0.1 K/UL (ref 0–0.5)
EOSINOPHIL NFR BLD: 0.6 % (ref 0–8)
ERYTHROCYTE [DISTWIDTH] IN BLOOD BY AUTOMATED COUNT: 12.7 % (ref 11.5–14.5)
EST. GFR  (NO RACE VARIABLE): >60 ML/MIN/1.73 M^2
GLUCOSE SERPL-MCNC: 121 MG/DL (ref 70–110)
GLUCOSE UR QL STRIP: NEGATIVE
HCT VFR BLD AUTO: 42.6 % (ref 37–48.5)
HGB BLD-MCNC: 14.6 G/DL (ref 12–16)
HGB UR QL STRIP: NEGATIVE
IMM GRANULOCYTES # BLD AUTO: 0.02 K/UL (ref 0–0.04)
IMM GRANULOCYTES NFR BLD AUTO: 0.2 % (ref 0–0.5)
KETONES UR QL STRIP: ABNORMAL
LEUKOCYTE ESTERASE UR QL STRIP: ABNORMAL
LIPASE SERPL-CCNC: 13 U/L (ref 4–60)
LYMPHOCYTES # BLD AUTO: 1.4 K/UL (ref 1–4.8)
LYMPHOCYTES NFR BLD: 12.8 % (ref 18–48)
MAGNESIUM SERPL-MCNC: 2.4 MG/DL (ref 1.6–2.6)
MCH RBC QN AUTO: 28.9 PG (ref 27–31)
MCHC RBC AUTO-ENTMCNC: 34.3 G/DL (ref 32–36)
MCV RBC AUTO: 84 FL (ref 82–98)
MICROSCOPIC COMMENT: NORMAL
MONOCYTES # BLD AUTO: 0.6 K/UL (ref 0.3–1)
MONOCYTES NFR BLD: 5.4 % (ref 4–15)
NEUTROPHILS # BLD AUTO: 8.6 K/UL (ref 1.8–7.7)
NEUTROPHILS NFR BLD: 80.6 % (ref 38–73)
NITRITE UR QL STRIP: NEGATIVE
NRBC BLD-RTO: 0 /100 WBC
PH UR STRIP: 5 [PH] (ref 5–8)
PLATELET # BLD AUTO: 301 K/UL (ref 150–450)
PMV BLD AUTO: 9.4 FL (ref 9.2–12.9)
POTASSIUM SERPL-SCNC: 4 MMOL/L (ref 3.5–5.1)
PROT SERPL-MCNC: 7.5 G/DL (ref 6–8.4)
PROT UR QL STRIP: NEGATIVE
RBC # BLD AUTO: 5.06 M/UL (ref 4–5.4)
RBC #/AREA URNS HPF: 0 /HPF (ref 0–4)
SODIUM SERPL-SCNC: 140 MMOL/L (ref 136–145)
SP GR UR STRIP: 1.02 (ref 1–1.03)
SQUAMOUS #/AREA URNS HPF: 1 /HPF
TROPONIN I SERPL DL<=0.01 NG/ML-MCNC: <0.006 NG/ML (ref 0–0.03)
URN SPEC COLLECT METH UR: ABNORMAL
UROBILINOGEN UR STRIP-ACNC: ABNORMAL EU/DL
WBC # BLD AUTO: 10.61 K/UL (ref 3.9–12.7)
WBC #/AREA URNS HPF: 3 /HPF (ref 0–5)

## 2022-10-03 PROCEDURE — 99285 EMERGENCY DEPT VISIT HI MDM: CPT | Mod: 25

## 2022-10-03 PROCEDURE — 96375 TX/PRO/DX INJ NEW DRUG ADDON: CPT

## 2022-10-03 PROCEDURE — 93010 ELECTROCARDIOGRAM REPORT: CPT | Mod: ,,, | Performed by: INTERNAL MEDICINE

## 2022-10-03 PROCEDURE — 25000003 PHARM REV CODE 250: Performed by: PHYSICIAN ASSISTANT

## 2022-10-03 PROCEDURE — 83690 ASSAY OF LIPASE: CPT | Performed by: PHYSICIAN ASSISTANT

## 2022-10-03 PROCEDURE — 25500020 PHARM REV CODE 255: Performed by: PHYSICIAN ASSISTANT

## 2022-10-03 PROCEDURE — 84484 ASSAY OF TROPONIN QUANT: CPT | Performed by: PHYSICIAN ASSISTANT

## 2022-10-03 PROCEDURE — 93010 EKG 12-LEAD: ICD-10-PCS | Mod: ,,, | Performed by: INTERNAL MEDICINE

## 2022-10-03 PROCEDURE — 80053 COMPREHEN METABOLIC PANEL: CPT | Performed by: PHYSICIAN ASSISTANT

## 2022-10-03 PROCEDURE — 63600175 PHARM REV CODE 636 W HCPCS: Performed by: PHYSICIAN ASSISTANT

## 2022-10-03 PROCEDURE — 96361 HYDRATE IV INFUSION ADD-ON: CPT

## 2022-10-03 PROCEDURE — 96374 THER/PROPH/DIAG INJ IV PUSH: CPT

## 2022-10-03 PROCEDURE — 85025 COMPLETE CBC W/AUTO DIFF WBC: CPT | Performed by: PHYSICIAN ASSISTANT

## 2022-10-03 PROCEDURE — 81000 URINALYSIS NONAUTO W/SCOPE: CPT | Performed by: PHYSICIAN ASSISTANT

## 2022-10-03 PROCEDURE — 93005 ELECTROCARDIOGRAM TRACING: CPT

## 2022-10-03 PROCEDURE — 83735 ASSAY OF MAGNESIUM: CPT | Performed by: PHYSICIAN ASSISTANT

## 2022-10-03 PROCEDURE — 83880 ASSAY OF NATRIURETIC PEPTIDE: CPT | Performed by: PHYSICIAN ASSISTANT

## 2022-10-03 RX ORDER — SYRING-NEEDL,DISP,INSUL,0.3 ML 29 G X1/2"
296 SYRINGE, EMPTY DISPOSABLE MISCELLANEOUS ONCE
Qty: 296 ML | Refills: 0 | Status: SHIPPED | OUTPATIENT
Start: 2022-10-03 | End: 2022-10-03

## 2022-10-03 RX ORDER — ONDANSETRON 2 MG/ML
4 INJECTION INTRAMUSCULAR; INTRAVENOUS
Status: COMPLETED | OUTPATIENT
Start: 2022-10-03 | End: 2022-10-03

## 2022-10-03 RX ORDER — MORPHINE SULFATE 4 MG/ML
4 INJECTION, SOLUTION INTRAMUSCULAR; INTRAVENOUS
Status: COMPLETED | OUTPATIENT
Start: 2022-10-03 | End: 2022-10-03

## 2022-10-03 RX ADMIN — IOHEXOL 75 ML: 350 INJECTION, SOLUTION INTRAVENOUS at 11:10

## 2022-10-03 RX ADMIN — SODIUM CHLORIDE 1000 ML: 0.9 INJECTION, SOLUTION INTRAVENOUS at 10:10

## 2022-10-03 RX ADMIN — MORPHINE SULFATE 4 MG: 4 INJECTION INTRAVENOUS at 10:10

## 2022-10-03 RX ADMIN — ONDANSETRON 4 MG: 2 INJECTION INTRAMUSCULAR; INTRAVENOUS at 10:10

## 2022-10-03 NOTE — ED TRIAGE NOTES
"Pt admitted to the ED for abd pain. Pt reports upon waking this morning she began to experiencing abd cramping. Pt states, "it feels like I have to poo but I can't." Pt reporting generalized pain not specific to certain quadrant. Pt reports last BM was 3 days ago. Pt denies nausea, vomiting, and back pain. Pt also reporting that she feels like it is difficult to urinate, but denies dysuria. Pt also reporting CP 3 days ago that has since resolved.   "

## 2022-10-03 NOTE — ED PROVIDER NOTES
Encounter Date: 10/3/2022       History     Chief Complaint   Patient presents with    Abdominal Pain     Generalized abdominal pain described as a cramping and knot in her stomach. Last BM 3 days ago. No n/v. Patient stated that she stopped taking her potassium supplement one week ago because she is on spirolactone. Patient is nervous about being on that medications and potassium supplement. Patient stated that she had chest pain 3 days ago and now it moves into her abdomen with left side numbness. Patient denies CP at present time     61-year-old female with history of COPD, anxiety, depression, hypertension, GERD presents the ER for evaluation of abdominal discomfort.  Patient reports symptoms started this morning at around 7:00 a.m..  She reports abdominal cramping diffusely.  She feels constipated.  Has not had a bowel movement in the last 3 days.  Patient states that she sat on the toilet for a prolonged period of time approximately an hour to have a bowel movement.  She states that her left leg went numb after sitting on the toilet for an hour which is now resolved.  She continues to have abdominal cramping and pain.  States that she initially had some left-sided chest discomfort for last 2 days which she now thinks moved down to her stomach.  She was treating this as gas.  Was taking sometimes today.  Denies any UTI symptoms but reports that her urine appears to be very dark.  Denies any flank pain, fevers or chills.  No previous abdominal surgeries.  Denies any blurred vision or visual disturbance.  Does not use blood thinners.    The history is provided by the patient.   Review of patient's allergies indicates:  No Known Allergies  Past Medical History:   Diagnosis Date    Anxiety     Arthritis     COPD (chronic obstructive pulmonary disease)     Depression     GERD (gastroesophageal reflux disease)     Hypertension      Past Surgical History:   Procedure Laterality Date    COLONOSCOPY N/A 12/6/2018     Procedure: COLONOSCOPY;  Surgeon: Endy Moncada MD;  Location: [facility] ([address] [place] [address]);  Service: Endoscopy;  Laterality: N/A;  PM Prep - sm    ESOPHAGOGASTRODUODENOSCOPY N/A 3/10/2020    Procedure: EGD (ESOPHAGOGASTRODUODENOSCOPY);  Surgeon: Derrell Kovacs MD;  Location: [address]);  Service: Endoscopy;  Laterality: N/A;     Family History   Problem Relation Age of Onset    Cancer Mother     Cancer Brother     Diabetes Maternal Uncle     Colon cancer Neg Hx     Stomach cancer Neg Hx     Inflammatory bowel disease Neg Hx      Social History     Tobacco Use    Smoking status: Former     Types: Cigarettes     Quit date: 1995     Years since quittin.7    Smokeless tobacco: Never   Substance Use Topics    Alcohol use: Yes     Comment: weekends    Drug use: No     Review of Systems   Constitutional:  Negative for chills and fever.   HENT:  Negative for congestion.    Eyes:  Negative for visual disturbance.   Respiratory:  Negative for cough and shortness of breath.    Cardiovascular:  Negative for chest pain.   Gastrointestinal:  Positive for abdominal pain and constipation. Negative for nausea and vomiting.   Genitourinary:  Negative for dysuria and flank pain.        Dark urine   Musculoskeletal:  Negative for myalgias.   Skin:  Negative for rash.   Allergic/Immunologic: Negative for immunocompromised state.   Neurological:  Negative for weakness and numbness.   Hematological:  Does not bruise/bleed easily.   Psychiatric/Behavioral:  Negative for confusion.      Physical Exam     Initial Vitals [10/03/22 0858]   BP Pulse Resp Temp SpO2   131/79 86 20 99 °F (37.2 °C) 100 %      MAP       --         Physical Exam    Vitals reviewed.  Constitutional: She appears well-developed and well-nourished. She is not diaphoretic. No distress.   HENT:   Head: Normocephalic and atraumatic.   Eyes: Conjunctivae and EOM are normal.   Neck: Neck supple.   Cardiovascular:  Normal rate, regular rhythm, normal  heart sounds and intact distal pulses.           Pulmonary/Chest: Breath sounds normal. No respiratory distress.   Abdominal: Abdomen is soft. She exhibits no distension. There is abdominal tenderness (Diffuse, worsening to the left lower quadrant). There is no rebound and no guarding.   Musculoskeletal:         General: Normal range of motion.      Cervical back: Neck supple.     Neurological: She is alert and oriented to person, place, and time. She has normal strength. No sensory deficit.   Finger-nose-finger test normal, heel-knee-shin test normal  Normal gait.   Skin: Skin is warm.   Psychiatric: Her mood appears anxious (very).       ED Course   Procedures  Labs Reviewed   CBC W/ AUTO DIFFERENTIAL - Abnormal; Notable for the following components:       Result Value    Gran # (ANC) 8.6 (*)     Gran % 80.6 (*)     Lymph % 12.8 (*)     All other components within normal limits    Narrative:     For upper or mid abdominal pain.   COMPREHENSIVE METABOLIC PANEL - Abnormal; Notable for the following components:    CO2 22 (*)     Glucose 121 (*)     All other components within normal limits    Narrative:     For upper or mid abdominal pain.   URINALYSIS, REFLEX TO URINE CULTURE - Abnormal; Notable for the following components:    Ketones, UA 1+ (*)     Urobilinogen, UA 2.0-3.0 (*)     Leukocytes, UA 2+ (*)     All other components within normal limits    Narrative:     Specimen Source->Urine   LIPASE    Narrative:     For upper or mid abdominal pain.   B-TYPE NATRIURETIC PEPTIDE    Narrative:     For upper or mid abdominal pain.   MAGNESIUM    Narrative:     For upper or mid abdominal pain.   TROPONIN I    Narrative:     For upper or mid abdominal pain.   URINALYSIS MICROSCOPIC    Narrative:     Specimen Source->Urine        ECG Results              EKG 12-lead (In process)  Result time 10/03/22 10:50:31      In process by Interface, Lab In [facility] (10/03/22 10:50:31)                   Narrative:    Test Reason :  R07.9,    Vent. Rate : 091 BPM     Atrial Rate : 091 BPM     P-R Int : 136 ms          QRS Dur : 078 ms      QT Int : 374 ms       P-R-T Axes : 081 -10 073 degrees     QTc Int : 460 ms    Normal sinus rhythm  Possible Anterior infarct (cited on or before 03-OCT-2022)  Abnormal ECG  When compared with ECG of 11-MAY-2022 15:59,  No significant change was found    Referred By: [email or web address]   SELF           Confirmed By:                                   Imaging Results              CT Abdomen Pelvis With Contrast (Final result)  Result time 10/03/22 12:14:40      Final result by David Sloan MD (10/03/22 12:14:40)                   Impression:      1. Moderate stool within the colon, could reflect developing constipation, correlation is advised.  2. Findings suggesting hepatic steatosis, correlation with LFTs recommended.  3. Left adrenal nodule, nonspecific.  4. Please see above for several additional findings.      Electronically signed by: David Sloan MD  Date:    10/03/2022  Time:    12:14               Narrative:    EXAMINATION:  CT ABDOMEN PELVIS WITH CONTRAST    CLINICAL HISTORY:  Bowel obstruction suspected;Abdominal pain, acute, nonlocalized;    TECHNIQUE:  Low dose axial images, sagittal and coronal reformations were obtained from the lung bases to the pubic symphysis following the IV administration of 75 mL of Omnipaque 350 .  Oral contrast was not given.    COMPARISON:  03/07/2020    FINDINGS:  Images of the lower thorax are remarkable for minimal dependent atelectasis.    The liver, spleen, pancreas, gallbladder and right adrenal gland are grossly unremarkable.  There is a nodule within the left adrenal gland measuring 2.2 cm, attenuation is nonspecific.  The stomach is decompressed without wall thickening.  Pancreatic duct is not dilated.  There is no biliary dilation or ascites.  The portal vein, splenic vein, SMV, celiac axis and SMA all are patent.  No significant abdominal  lymphadenopathy.    The kidneys enhance symmetrically without hydronephrosis or nephrolithiasis.  There is a low attenuating lesion arising from the interpolar region of the right kidney measuring 1.7 cm, attenuation suggests cyst.  The bilateral ureters are unremarkable without calculi seen.  The urinary bladder is unremarkable.  The uterus and adnexa are unremarkable.  No significant free fluid in the pelvis.    The large bowel is grossly unremarkable noting moderate to large amount of stool within the right colon.  The terminal ileum is unremarkable.  The appendix is unremarkable.  The small bowel is grossly unremarkable.  There are a few scattered shotty periaortic and paracaval lymph nodes.  There is atherosclerotic calcification of the aorta and its branches.  No focal organized pelvic fluid collection.    There is osteopenia.  Degenerative changes are noted of the spine.                                       Medications   sodium chloride 0.9% bolus 1,000 mL (0 mLs Intravenous Stopped 10/3/22 1137)   morphine injection 4 mg (4 mg Intravenous Given 10/3/22 1017)   ondansetron injection 4 mg (4 mg Intravenous Given 10/3/22 1020)   iohexoL (OMNIPAQUE 350) injection 75 mL (75 mLs Intravenous Given 10/3/22 1151)           APC / Resident Notes:   Patient seen in the ER promptly upon arrival.  She is afebrile, no acute distress.  Physical examination reveals diffuse tenderness on palpation throughout the abdomen worsening over the left side.  Abdomen is otherwise soft, nondistended.  No CVA tenderness on exam.  Heart and lung sounds normal.  No focal neurological deficit on exam.  She is ambulatory with a steady gait.  EKG shows normal sinus rhythm at a rate of 91 beats per minute.  Patient does appear to be very anxious on my exam.      ED Course as of 10/03/22 1854   Mon Oct 03, 2022   1243 Lab work shows normal white count of 10.6.  Hemoglobin stable.  Chemistries were found to be unremarkable.  Cardiac workup  essentially unremarkable.  Urinalysis does not show evidence of infection or blood minimal ketones noted.  Likely due to dehydration, will advised to increase oral hydration at home.  CT of the abdomen pelvis concerning for constipation.  No evidence of acute obstruction.  No inflammatory changes noted.    Will prescribed home on magnesium citrate for the constipation.  She was advised increase oral hydration and fiber intake at home.  She was given strict return precautions ED which was agreeable to.  She is stable for discharge and close follow-up.    Disclaimer: This note has been generated using voice-recognition software. There may be typographical errors that have been missed during proof-reading.     [AJ]      ED Course User Index  [AJ] Vanessa Moore PA-C                 Clinical Impression:   Final diagnoses:  [R07.9] Chest pain  [K59.00] Constipation, unspecified constipation type (Primary)        ED Disposition Condition    Discharge Stable          ED Prescriptions       Medication Sig Dispense Start Date End Date Auth. Provider    magnesium citrate solution (Expires today) Take 296 mLs by mouth once. for 1 dose 296 mL 10/3/2022 10/3/2022 Vanessa Moore PA-C          Follow-up Information       Follow up With Specialties Details Why Contact Info Additional Information    [facility] Family Medicine Schedule an appointment as soon as possible for a visit   [address], [address]  [facility] [address] [phone] Please park in [address] or D and use [address] holguin. Take Medical Office Bldg. elevators.             Vanessa Moore PA-C  10/03/22 1168

## 2022-10-05 ENCOUNTER — PATIENT OUTREACH (OUTPATIENT)
Dept: EMERGENCY MEDICINE | Facility: HOSPITAL | Age: 62
End: 2022-10-05
Payer: MEDICARE

## 2023-05-17 ENCOUNTER — HOSPITAL ENCOUNTER (EMERGENCY)
Facility: HOSPITAL | Age: 63
Discharge: HOME | End: 2023-05-17
Attending: EMERGENCY MEDICINE
Payer: MEDICARE

## 2023-05-17 DIAGNOSIS — K21.9 GASTRIC REFLUX: Primary | ICD-10-CM

## 2023-05-17 DIAGNOSIS — G47.00 INSOMNIA, UNSPECIFIED TYPE: ICD-10-CM

## 2023-05-17 DIAGNOSIS — R07.9 CHEST PAIN: ICD-10-CM

## 2023-05-17 LAB
ALBUMIN SERPL BCP-MCNC: 4.5 G/DL (ref 3.5–5.2)
ALP SERPL-CCNC: 88 U/L (ref 55–135)
ALT SERPL W/O P-5'-P-CCNC: 22 U/L (ref 10–44)
ANION GAP SERPL CALC-SCNC: 14 MMOL/L (ref 8–16)
AST SERPL-CCNC: 27 U/L (ref 10–40)
BASOPHILS # BLD AUTO: 0.04 K/UL (ref 0–0.2)
BASOPHILS NFR BLD: 0.5 % (ref 0–1.9)
BILIRUB SERPL-MCNC: 0.6 MG/DL (ref 0.1–1)
BILIRUB UR QL STRIP: NEGATIVE
BNP SERPL-MCNC: <10 PG/ML (ref 0–99)
BUN SERPL-MCNC: 14 MG/DL (ref 8–23)
CALCIUM SERPL-MCNC: 9.1 MG/DL (ref 8.7–10.5)
CHLORIDE SERPL-SCNC: 105 MMOL/L (ref 95–110)
CLARITY UR: CLEAR
CO2 SERPL-SCNC: 21 MMOL/L (ref 23–29)
COLOR UR: COLORLESS
CREAT SERPL-MCNC: 0.9 MG/DL (ref 0.5–1.4)
DIFFERENTIAL METHOD: NORMAL
EOSINOPHIL # BLD AUTO: 0 K/UL (ref 0–0.5)
EOSINOPHIL NFR BLD: 0.5 % (ref 0–8)
ERYTHROCYTE [DISTWIDTH] IN BLOOD BY AUTOMATED COUNT: 12.8 % (ref 11.5–14.5)
EST. GFR  (NO RACE VARIABLE): >60 ML/MIN/1.73 M^2
GLUCOSE SERPL-MCNC: 117 MG/DL (ref 70–110)
GLUCOSE UR QL STRIP: NEGATIVE
HCT VFR BLD AUTO: 42.8 % (ref 37–48.5)
HGB BLD-MCNC: 14.2 G/DL (ref 12–16)
HGB UR QL STRIP: NEGATIVE
IMM GRANULOCYTES # BLD AUTO: 0.01 K/UL (ref 0–0.04)
IMM GRANULOCYTES NFR BLD AUTO: 0.1 % (ref 0–0.5)
KETONES UR QL STRIP: NEGATIVE
LEUKOCYTE ESTERASE UR QL STRIP: NEGATIVE
LIPASE SERPL-CCNC: 13 U/L (ref 4–60)
LYMPHOCYTES # BLD AUTO: 1.8 K/UL (ref 1–4.8)
LYMPHOCYTES NFR BLD: 24 % (ref 18–48)
MCH RBC QN AUTO: 28.9 PG (ref 27–31)
MCHC RBC AUTO-ENTMCNC: 33.2 G/DL (ref 32–36)
MCV RBC AUTO: 87 FL (ref 82–98)
MONOCYTES # BLD AUTO: 0.5 K/UL (ref 0.3–1)
MONOCYTES NFR BLD: 6 % (ref 4–15)
NEUTROPHILS # BLD AUTO: 5.2 K/UL (ref 1.8–7.7)
NEUTROPHILS NFR BLD: 68.9 % (ref 38–73)
NITRITE UR QL STRIP: NEGATIVE
NRBC BLD-RTO: 0 /100 WBC
PH UR STRIP: 6 [PH] (ref 5–8)
PLATELET # BLD AUTO: 270 K/UL (ref 150–450)
PMV BLD AUTO: 9.8 FL (ref 9.2–12.9)
POTASSIUM SERPL-SCNC: 4.7 MMOL/L (ref 3.5–5.1)
PROT SERPL-MCNC: 7.9 G/DL (ref 6–8.4)
PROT UR QL STRIP: NEGATIVE
RBC # BLD AUTO: 4.92 M/UL (ref 4–5.4)
SODIUM SERPL-SCNC: 140 MMOL/L (ref 136–145)
SP GR UR STRIP: 1.01 (ref 1–1.03)
TROPONIN I SERPL DL<=0.01 NG/ML-MCNC: <0.006 NG/ML (ref 0–0.03)
URN SPEC COLLECT METH UR: ABNORMAL
UROBILINOGEN UR STRIP-ACNC: NEGATIVE EU/DL
WBC # BLD AUTO: 7.62 K/UL (ref 3.9–12.7)

## 2023-05-17 PROCEDURE — 99285 EMERGENCY DEPT VISIT HI MDM: CPT | Mod: 25

## 2023-05-17 PROCEDURE — 81003 URINALYSIS AUTO W/O SCOPE: CPT | Performed by: NURSE PRACTITIONER

## 2023-05-17 PROCEDURE — 80053 COMPREHEN METABOLIC PANEL: CPT | Performed by: NURSE PRACTITIONER

## 2023-05-17 PROCEDURE — 93010 EKG 12-LEAD: ICD-10-PCS | Mod: ,,, | Performed by: INTERNAL MEDICINE

## 2023-05-17 PROCEDURE — 93010 ELECTROCARDIOGRAM REPORT: CPT | Mod: ,,, | Performed by: INTERNAL MEDICINE

## 2023-05-17 PROCEDURE — 84484 ASSAY OF TROPONIN QUANT: CPT | Performed by: EMERGENCY MEDICINE

## 2023-05-17 PROCEDURE — 83690 ASSAY OF LIPASE: CPT | Performed by: NURSE PRACTITIONER

## 2023-05-17 PROCEDURE — 25000003 PHARM REV CODE 250: Performed by: EMERGENCY MEDICINE

## 2023-05-17 PROCEDURE — 93005 ELECTROCARDIOGRAM TRACING: CPT

## 2023-05-17 PROCEDURE — 83880 ASSAY OF NATRIURETIC PEPTIDE: CPT | Performed by: NURSE PRACTITIONER

## 2023-05-17 PROCEDURE — 85025 COMPLETE CBC W/AUTO DIFF WBC: CPT | Performed by: NURSE PRACTITIONER

## 2023-05-17 RX ORDER — ALUMINUM HYDROXIDE, MAGNESIUM HYDROXIDE, AND SIMETHICONE 2400; 240; 2400 MG/30ML; MG/30ML; MG/30ML
15 SUSPENSION ORAL EVERY 6 HOURS PRN
Qty: 335 ML | Refills: 0 | Status: SHIPPED | OUTPATIENT
Start: 2023-05-17 | End: 2023-06-16

## 2023-05-17 RX ORDER — LIDOCAINE HYDROCHLORIDE 20 MG/ML
15 SOLUTION OROPHARYNGEAL ONCE
Status: COMPLETED | OUTPATIENT
Start: 2023-05-17 | End: 2023-05-17

## 2023-05-17 RX ORDER — MAG HYDROX/ALUMINUM HYD/SIMETH 200-200-20
30 SUSPENSION, ORAL (FINAL DOSE FORM) ORAL ONCE
Status: COMPLETED | OUTPATIENT
Start: 2023-05-17 | End: 2023-05-17

## 2023-05-17 RX ADMIN — LIDOCAINE HYDROCHLORIDE 15 ML: 20 SOLUTION ORAL; TOPICAL at 06:05

## 2023-05-17 RX ADMIN — ALUMINUM HYDROXIDE, MAGNESIUM HYDROXIDE, AND SIMETHICONE 30 ML: 200; 200; 20 SUSPENSION ORAL at 06:05

## 2023-05-17 NOTE — FIRST PROVIDER EVALUATION
" Emergency Department TeleTriage Encounter Note      CHIEF COMPLAINT    Chief Complaint   Patient presents with    Chest Pain     Chest pain and burning, especially after eating for two weeks. Reports having SOB and cough up a lot of mucus.        VITAL SIGNS   Initial Vitals [05/17/23 1504]   BP Pulse Resp Temp SpO2   136/82 102 20 98.3 °F (36.8 °C) 100 %      MAP       --            ALLERGIES    Review of patient's allergies indicates:  No Known Allergies    PROVIDER TRIAGE NOTE  This is a teletriage evaluation of a 62 y.o. female presenting to the ED complaining of chest pain for two weeks. States that pain is "burning" in nature.  Has had a productive cough and "have to spit" after eating. Also reports lower abd pain and back pain.  Pt has pmhx of COPD.     Well-appearing, speaking in full sentences. No distress.     Initial orders will be placed and care will be transferred to an alternate provider when patient is roomed for a full evaluation. Any additional orders and the final disposition will be determined by that provider.         ORDERS  Labs Reviewed - No data to display    ED Orders (720h ago, onward)      Start Ordered     Status Ordering Provider    05/17/23 1519 05/17/23 1518  Cardiac Monitoring - Adult  Continuous        Comments: Notify Physician If:    Ordered WENDY EMANUEL N.    05/17/23 1519 05/17/23 1518  Pulse Oximetry Continuous  Continuous         Ordered WENDY EMANUEL N.    05/17/23 1509 05/17/23 1509  EKG 12-lead  Once         Ordered CRYSTAL MONTANO    Unscheduled 05/17/23 1518  CBC auto differential  STAT         Ordered WENDY EMANUEL N.    Unscheduled 05/17/23 1518  Comprehensive metabolic panel  STAT         Ordered WENDY EMANUEL N.    Unscheduled 05/17/23 1518  Lipase  STAT         Ordered WENDY EMANUEL N.    Unscheduled 05/17/23 1518  Urinalysis, Reflex to Urine Culture Urine, Clean Catch  STAT         Ordered WENDY EMANUEL N.    " Unscheduled 05/17/23 1518  X-Ray Chest PA And Lateral  1 time imaging         Ordered WENDY EMANUEL              Virtual Visit Note: The provider triage portion of this emergency department evaluation and documentation was performed via [employer], a HIPAA-compliant telemedicine application, in concert with a tele-presenter in the room. A face to face patient evaluation with one of my colleagues will occur once the patient is placed in an emergency department room.      DISCLAIMER: This note was prepared with [employer] voice recognition transcription software. Garbled syntax, mangled pronouns, and other bizarre constructions may be attributed to that software system.

## 2023-05-18 VITALS
HEIGHT: 62 IN | DIASTOLIC BLOOD PRESSURE: 78 MMHG | RESPIRATION RATE: 22 BRPM | WEIGHT: 140 LBS | OXYGEN SATURATION: 100 % | TEMPERATURE: 98 F | HEART RATE: 86 BPM | BODY MASS INDEX: 25.76 KG/M2 | SYSTOLIC BLOOD PRESSURE: 142 MMHG

## 2023-05-18 NOTE — ED PROVIDER NOTES
Encounter Date: 2023       History     Chief Complaint   Patient presents with    Chest Pain     Chest pain and burning, especially after eating for two weeks. Reports having SOB and cough up a lot of mucus.      62-year-old female with past medical history as below presents complaint of epigastric abdominal pain.  Patient says that she eats a lot overnight because she can not sleep.  Came to the emergency department because ever since her GI doctor stopped her Prilosec she has had worsening gastric reflux symptoms.  Says she was told to stop this medication after an endoscopy. She now takes it only once a week.  She denies fever, diarrhea.    The history is provided by the patient.   Review of patient's allergies indicates:  No Known Allergies  Past Medical History:   Diagnosis Date    Anxiety     Arthritis     COPD (chronic obstructive pulmonary disease)     Depression     GERD (gastroesophageal reflux disease)     Hypertension      Past Surgical History:   Procedure Laterality Date    COLONOSCOPY N/A 2018    Procedure: COLONOSCOPY;  Surgeon: Endy Moncada MD;  Location: [address]);  Service: Endoscopy;  Laterality: N/A;  PM Prep - sm    ESOPHAGOGASTRODUODENOSCOPY N/A 3/10/2020    Procedure: EGD (ESOPHAGOGASTRODUODENOSCOPY);  Surgeon: Derrell Kovacs MD;  Location: [address]);  Service: Endoscopy;  Laterality: N/A;     Family History   Problem Relation Age of Onset    Cancer Mother     Cancer Brother     Diabetes Maternal Uncle     Colon cancer Neg Hx     Stomach cancer Neg Hx     Inflammatory bowel disease Neg Hx      Social History     Tobacco Use    Smoking status: Former     Types: Cigarettes     Quit date: 1995     Years since quittin.4    Smokeless tobacco: Never   Substance Use Topics    Alcohol use: Yes     Comment: weekends    Drug use: No     Review of Systems   Gastrointestinal:  Positive for abdominal pain.     Physical Exam     Initial Vitals [23  1504]   BP Pulse Resp Temp SpO2   136/82 102 20 98.3 °F (36.8 °C) 100 %      MAP       --         Physical Exam    Nursing note and vitals reviewed.  Constitutional: She appears well-developed. She is not diaphoretic. No distress.   HENT:   Head: Normocephalic and atraumatic.   Eyes: EOM are normal. Pupils are equal, round, and reactive to light.   Neck: Neck supple.   Normal range of motion.  Cardiovascular:  Normal rate.           Pulmonary/Chest: Breath sounds normal. No respiratory distress. She has no wheezes.   Abdominal: Abdomen is soft. She exhibits no distension. There is no abdominal tenderness.   Musculoskeletal:         General: Normal range of motion.      Cervical back: Normal range of motion and neck supple.     Neurological: She is alert and oriented to person, place, and time.   Skin: Skin is warm and dry.   Psychiatric: She has a normal mood and affect.       ED Course   Procedures  Labs Reviewed   COMPREHENSIVE METABOLIC PANEL - Abnormal; Notable for the following components:       Result Value    CO2 21 (*)     Glucose 117 (*)     All other components within normal limits   URINALYSIS, REFLEX TO URINE CULTURE - Abnormal; Notable for the following components:    Color, UA Colorless (*)     All other components within normal limits    Narrative:     Specimen Source->Urine   CBC W/ AUTO DIFFERENTIAL   LIPASE   B-TYPE NATRIURETIC PEPTIDE   TROPONIN I     EKG Readings: (Independently Interpreted)   Initial Reading: No STEMI. Rhythm: Normal Sinus Rhythm. Heart Rate: 100. Conduction: Normal. Axis: Normal. Clinical Impression: Normal Sinus Rhythm   ECG Results              EKG 12-lead (Final result)  Result time 05/17/23 19:04:28      Final result by Interface, Lab In [facility] (05/17/23 19:04:28)                   Narrative:    Test Reason : R07.9,    Vent. Rate : 100 BPM     Atrial Rate : 100 BPM     P-R Int : 134 ms          QRS Dur : 078 ms      QT Int : 368 ms       P-R-T Axes : 080 -08 071 degrees      QTc Int : 474 ms    Normal sinus rhythm  Normal ECG  When compared with ECG of 03-OCT-2022 09:51,  No significant change was found  Confirmed by Ronak ORTA, Nato PERALES ([id number]) on 5/17/2023 7:04:17 PM    Referred By: DALE   SELF           Confirmed By:Nato Simons MD                                  Imaging Results              X-Ray Chest PA And Lateral (Final result)  Result time 05/17/23 16:56:43      Final result by Haresh Negro MD (05/17/23 16:56:43)                   Impression:      No acute process.      Electronically signed by: Haresh Negro MD  Date:    05/17/2023  Time:    16:56               Narrative:    EXAMINATION:  XR CHEST PA AND LATERAL    CLINICAL HISTORY:  Chest pain, unspecified    TECHNIQUE:  PA and lateral views of the chest were performed.    COMPARISON:  05/11/2022.    FINDINGS:  The trachea is unremarkable.  The cardiomediastinal silhouette is within normal limits.  There is no evidence of free beneath the hemidiaphragms.  No pleural effusions.  There is no evidence of a pneumothorax.  There is no evidence pneumomediastinum.  No airspace opacity is present.  There is unchanged appearance of bibasilar subsegmental atelectasis.  The osseous structures are unremarkable.                                       Medications   aluminum-magnesium hydroxide-simethicone 200-200-20 mg/5 mL suspension 30 mL (30 mLs Oral Given 5/17/23 1804)     And   LIDOcaine HCl 2% oral solution 15 mL (15 mLs Oral Given 5/17/23 1804)     Medical Decision Making:   History:   Old Records Summarized: other records.       <> Summary of Records: Seen 10/03/2022 for constipation  Differential Diagnosis:   STEMI, pancreatitis, gastric reflux  Clinical Tests:   Lab Tests: Reviewed and Ordered  Radiological Study: Ordered and Reviewed  Medical Tests: Reviewed and Ordered  ED Management:  62-year-old female past medical history as above complaint of epigastric abdominal pain.  Upon arrival she was afebrile, stable vital  signs.  Reports that symptoms improved after GI cocktail.  No evidence of volume overload or shock on exam. EKG without acute ischemic changes. Low suspicion for acute PE, pneumothorax, thoracic aortic dissection, cardiac effusion / tamponade. CXR w/o infiltrate. Troponin wnl. No electrolyte abnormalities.  Describes symptoms related to gastric reflux, eats food overnight, greasy foods, acidic foods.  Counseled about appropriate symptomatic therapy.  Instructed to follow up outpatient with GI.  Also notes that she has had a difficult time sleeping because she does not take the Seroquel prescribed to her by her psychiatrist.  Counseled so trialed this medication and was also given a referral to see a new psychiatrist.  Given strict return precautions and outpatient follow up.    No acute emergent medical condition has been identified. The patient appears to be low risk for an emergent medical condition is appropriate for discharge with outpatient f/u as detailed in discharge instructions for reevaluation and possible continued outpatient workup and management. I have discussed the workup with the patient, who has verbalized understanding of the plan and need for outpatient follow-up.  This evaluation does not preclude the development of an emergent condition so in addition, I have advised the patient that they can return to the ED at any time with worsening or change of their symptoms, or with any other medical complaint.              ED Course as of 05/18/23 0125   Wed May 17, 2023   1846 Troponin I: <0.006  Normal  [AT]   1846 Creatinine: 0.9  Normal  [AT]   1846 Hemoglobin: 14.2  Normal  [AT]      ED Course User Index  [AT] Luz Hobson MD                 Clinical Impression:   Final diagnoses:  [R07.9] Chest pain  [K21.9] Gastric reflux (Primary)  [G47.00] Insomnia, unspecified type        ED Disposition Condition    Discharge Stable          ED Prescriptions       Medication Sig Dispense Start Date End  Date Auth. Provider    aluminum & magnesium hydroxide-simethicone (MAALOX MAXIMUM STRENGTH) 400-400-40 mg/5 mL suspension Take 15 mLs by mouth every 6 (six) hours as needed for Indigestion. 335 mL 5/17/2023 6/16/2023 Luz Hobson MD          Follow-up Information       Follow up With Specialties Details Why Contact [facility] - Emergency Dept Emergency Medicine  As needed, If symptoms worsen [address] [id number] [phone]    [facility] Schedule an appointment as soon as possible for a visit in 2 days  [address] [facility] [place] [address]  [phone]      Your GI doctor  Schedule an appointment as soon as possible for a visit in 2 days      [facility] Psychiatry Schedule an appointment as soon as possible for a visit in 2 days  [address] [address]  [phone]             Luz Hobson MD  05/18/23 0126

## 2023-05-18 NOTE — ED NOTES
Pt c/o indigestion x 2 weeks. Reports hx. Pt taking omeprazole as prescribed by PCP daily. States GI physician instructed pt to stop taking medication. Pt unsure of reason for discontinuing medication. Denies nausea and vomiting. Pt AAOx3. Respirations even and unlabored. Skin is warm and dry. NAD noted. CB within reach.    APPEARANCE: Alert, oriented and in no acute distress.  CARDIAC: Hypertensive. Chest pain. Normal rate.   PERIPHERAL VASCULAR: Normal cap refill. No edema. Warm to touch.    RESPIRATORY: Respirations are even and unlabored. Normal rate. Pt denies SOB. Symmetrical chest expansion bilaterally. No obvious signs of distress.  GASTRO: Abdomen soft, non-tender, no distention.  MUSC: Full ROM. No bony tenderness or soft tissue tenderness. No obvious deformity.  SKIN: Skin is warm and dry.  NEURO: Ky coma scale: eyes open spontaneously-4, oriented & converses-5, obeys commands-6. No neurological abnormalities.   MENTAL STATUS: Awake, alert and aware of environment.

## 2023-05-18 NOTE — DISCHARGE INSTRUCTIONS

## 2024-07-05 ENCOUNTER — HOSPITAL ENCOUNTER (EMERGENCY)
Facility: HOSPITAL | Age: 64
Discharge: HOME | End: 2024-07-05
Attending: EMERGENCY MEDICINE
Payer: MEDICARE

## 2024-07-05 VITALS
OXYGEN SATURATION: 96 % | HEART RATE: 92 BPM | HEIGHT: 62 IN | TEMPERATURE: 98 F | BODY MASS INDEX: 25.76 KG/M2 | SYSTOLIC BLOOD PRESSURE: 136 MMHG | WEIGHT: 140 LBS | RESPIRATION RATE: 23 BRPM | DIASTOLIC BLOOD PRESSURE: 91 MMHG

## 2024-07-05 DIAGNOSIS — R42 LIGHTHEADEDNESS: Primary | ICD-10-CM

## 2024-07-05 DIAGNOSIS — R55 NEAR SYNCOPE: ICD-10-CM

## 2024-07-05 LAB
ALBUMIN SERPL BCP-MCNC: 4.2 G/DL (ref 3.5–5.2)
ALP SERPL-CCNC: 90 U/L (ref 55–135)
ALT SERPL W/O P-5'-P-CCNC: 19 U/L (ref 10–44)
ANION GAP SERPL CALC-SCNC: 12 MMOL/L (ref 8–16)
AST SERPL-CCNC: 25 U/L (ref 10–40)
BASOPHILS # BLD AUTO: 0.05 K/UL (ref 0–0.2)
BASOPHILS NFR BLD: 0.8 % (ref 0–1.9)
BILIRUB SERPL-MCNC: 0.5 MG/DL (ref 0.1–1)
BILIRUB UR QL STRIP: NEGATIVE
BUN SERPL-MCNC: 14 MG/DL (ref 8–23)
CALCIUM SERPL-MCNC: 9.1 MG/DL (ref 8.7–10.5)
CHLORIDE SERPL-SCNC: 108 MMOL/L (ref 95–110)
CLARITY UR: CLEAR
CO2 SERPL-SCNC: 21 MMOL/L (ref 23–29)
COLOR UR: COLORLESS
CREAT SERPL-MCNC: 0.9 MG/DL (ref 0.5–1.4)
D DIMER PPP IA.FEU-MCNC: 0.25 MG/L FEU
DIFFERENTIAL METHOD BLD: ABNORMAL
EOSINOPHIL # BLD AUTO: 0 K/UL (ref 0–0.5)
EOSINOPHIL NFR BLD: 0.7 % (ref 0–8)
ERYTHROCYTE [DISTWIDTH] IN BLOOD BY AUTOMATED COUNT: 13.2 % (ref 11.5–14.5)
EST. GFR  (NO RACE VARIABLE): >60 ML/MIN/1.73 M^2
GLUCOSE SERPL-MCNC: 138 MG/DL (ref 70–110)
GLUCOSE UR QL STRIP: NEGATIVE
HCT VFR BLD AUTO: 41.5 % (ref 37–48.5)
HGB BLD-MCNC: 14.2 G/DL (ref 12–16)
HGB UR QL STRIP: NEGATIVE
IMM GRANULOCYTES # BLD AUTO: 0.02 K/UL (ref 0–0.04)
IMM GRANULOCYTES NFR BLD AUTO: 0.3 % (ref 0–0.5)
KETONES UR QL STRIP: NEGATIVE
LEUKOCYTE ESTERASE UR QL STRIP: NEGATIVE
LYMPHOCYTES # BLD AUTO: 1.1 K/UL (ref 1–4.8)
LYMPHOCYTES NFR BLD: 17.4 % (ref 18–48)
MAGNESIUM SERPL-MCNC: 2.1 MG/DL (ref 1.6–2.6)
MCH RBC QN AUTO: 29.3 PG (ref 27–31)
MCHC RBC AUTO-ENTMCNC: 34.2 G/DL (ref 32–36)
MCV RBC AUTO: 86 FL (ref 82–98)
MONOCYTES # BLD AUTO: 0.3 K/UL (ref 0.3–1)
MONOCYTES NFR BLD: 5 % (ref 4–15)
NEUTROPHILS # BLD AUTO: 4.7 K/UL (ref 1.8–7.7)
NEUTROPHILS NFR BLD: 75.8 % (ref 38–73)
NITRITE UR QL STRIP: NEGATIVE
NRBC BLD-RTO: 0 /100 WBC
PH UR STRIP: 8 [PH] (ref 5–8)
PLATELET # BLD AUTO: 309 K/UL (ref 150–450)
PMV BLD AUTO: 9.4 FL (ref 9.2–12.9)
POTASSIUM SERPL-SCNC: 4.7 MMOL/L (ref 3.5–5.1)
PROT SERPL-MCNC: 7.6 G/DL (ref 6–8.4)
PROT UR QL STRIP: NEGATIVE
RBC # BLD AUTO: 4.84 M/UL (ref 4–5.4)
SODIUM SERPL-SCNC: 141 MMOL/L (ref 136–145)
SP GR UR STRIP: 1.01 (ref 1–1.03)
TROPONIN I SERPL DL<=0.01 NG/ML-MCNC: <0.006 NG/ML (ref 0–0.03)
URN SPEC COLLECT METH UR: ABNORMAL
UROBILINOGEN UR STRIP-ACNC: NEGATIVE EU/DL
WBC # BLD AUTO: 6.14 K/UL (ref 3.9–12.7)

## 2024-07-05 PROCEDURE — 85379 FIBRIN DEGRADATION QUANT: CPT | Performed by: EMERGENCY MEDICINE

## 2024-07-05 PROCEDURE — 93005 ELECTROCARDIOGRAM TRACING: CPT

## 2024-07-05 PROCEDURE — 25000003 PHARM REV CODE 250: Performed by: PHYSICIAN ASSISTANT

## 2024-07-05 PROCEDURE — 83735 ASSAY OF MAGNESIUM: CPT | Performed by: PHYSICIAN ASSISTANT

## 2024-07-05 PROCEDURE — 93010 ELECTROCARDIOGRAM REPORT: CPT | Mod: ,,, | Performed by: INTERNAL MEDICINE

## 2024-07-05 PROCEDURE — 99285 EMERGENCY DEPT VISIT HI MDM: CPT | Mod: 25

## 2024-07-05 PROCEDURE — 84484 ASSAY OF TROPONIN QUANT: CPT | Performed by: PHYSICIAN ASSISTANT

## 2024-07-05 PROCEDURE — 80053 COMPREHEN METABOLIC PANEL: CPT | Performed by: PHYSICIAN ASSISTANT

## 2024-07-05 PROCEDURE — 85025 COMPLETE CBC W/AUTO DIFF WBC: CPT | Performed by: PHYSICIAN ASSISTANT

## 2024-07-05 PROCEDURE — 81003 URINALYSIS AUTO W/O SCOPE: CPT | Performed by: PHYSICIAN ASSISTANT

## 2024-07-05 RX ORDER — HYDROXYZINE PAMOATE 25 MG/1
25 CAPSULE ORAL
Status: COMPLETED | OUTPATIENT
Start: 2024-07-05 | End: 2024-07-05

## 2024-07-05 RX ADMIN — HYDROXYZINE PAMOATE 25 MG: 25 CAPSULE ORAL at 03:07
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
## 2024-07-05 NOTE — DISCHARGE INSTRUCTIONS

## 2024-07-08 LAB
OHS QRS DURATION: 72 MS
OHS QTC CALCULATION: 484 MS

## 2024-07-11 LAB
OHS QRS DURATION: 72 MS
OHS QTC CALCULATION: 450 MS

## 2024-07-15 ENCOUNTER — OFFICE VISIT (OUTPATIENT)
Dept: FAMILY MEDICINE | Facility: HOSPITAL | Age: 64
End: 2024-07-15
Payer: MEDICARE

## 2024-07-15 VITALS
WEIGHT: 144.19 LBS | SYSTOLIC BLOOD PRESSURE: 125 MMHG | DIASTOLIC BLOOD PRESSURE: 86 MMHG | HEART RATE: 87 BPM | HEIGHT: 62 IN | BODY MASS INDEX: 26.53 KG/M2

## 2024-07-15 DIAGNOSIS — R07.81 RIB PAIN: ICD-10-CM

## 2024-07-15 DIAGNOSIS — R55 NEAR SYNCOPE: ICD-10-CM

## 2024-07-15 DIAGNOSIS — G47.00 INSOMNIA, UNSPECIFIED TYPE: Primary | ICD-10-CM

## 2024-07-15 PROCEDURE — 99215 OFFICE O/P EST HI 40 MIN: CPT

## 2024-07-15 RX ORDER — CLOPIDOGREL BISULFATE 75 MG/1
1 TABLET ORAL DAILY
COMMUNITY
Start: 2024-06-10

## 2024-07-15 RX ORDER — DOXEPIN 3 MG/1
3 TABLET, FILM COATED ORAL NIGHTLY
Qty: 90 TABLET | Refills: 0 | Status: SHIPPED | OUTPATIENT
Start: 2024-07-15

## 2024-07-15 RX ORDER — GABAPENTIN 300 MG/1
1 CAPSULE ORAL 3 TIMES DAILY
COMMUNITY
Start: 2023-09-11 | End: 2024-07-18 | Stop reason: ALTCHOICE

## 2024-07-18 ENCOUNTER — OFFICE VISIT (OUTPATIENT)
Dept: PAIN MEDICINE | Facility: CLINIC | Age: 64
End: 2024-07-18
Payer: MEDICARE

## 2024-07-18 ENCOUNTER — TELEPHONE (OUTPATIENT)
Dept: PAIN MEDICINE | Facility: CLINIC | Age: 64
End: 2024-07-18
Payer: MEDICARE

## 2024-07-18 VITALS
DIASTOLIC BLOOD PRESSURE: 85 MMHG | HEART RATE: 81 BPM | BODY MASS INDEX: 26.69 KG/M2 | HEIGHT: 62 IN | SYSTOLIC BLOOD PRESSURE: 123 MMHG | WEIGHT: 145.06 LBS

## 2024-07-18 DIAGNOSIS — G58.8 INTERCOSTAL NEURALGIA: ICD-10-CM

## 2024-07-18 DIAGNOSIS — R07.81 RIB PAIN: Primary | ICD-10-CM

## 2024-07-18 DIAGNOSIS — G58.8 INTERCOSTAL NEURALGIA: Primary | ICD-10-CM

## 2024-07-18 DIAGNOSIS — R07.81 RIB PAIN: ICD-10-CM

## 2024-07-18 PROCEDURE — 1159F MED LIST DOCD IN RCRD: CPT | Mod: CPTII,S$GLB,, | Performed by: STUDENT IN AN ORGANIZED HEALTH CARE EDUCATION/TRAINING PROGRAM

## 2024-07-18 PROCEDURE — 99204 OFFICE O/P NEW MOD 45 MIN: CPT | Mod: S$GLB,,, | Performed by: STUDENT IN AN ORGANIZED HEALTH CARE EDUCATION/TRAINING PROGRAM

## 2024-07-18 PROCEDURE — 3008F BODY MASS INDEX DOCD: CPT | Mod: CPTII,S$GLB,, | Performed by: STUDENT IN AN ORGANIZED HEALTH CARE EDUCATION/TRAINING PROGRAM

## 2024-07-18 PROCEDURE — 3079F DIAST BP 80-89 MM HG: CPT | Mod: CPTII,S$GLB,, | Performed by: STUDENT IN AN ORGANIZED HEALTH CARE EDUCATION/TRAINING PROGRAM

## 2024-07-18 PROCEDURE — 3074F SYST BP LT 130 MM HG: CPT | Mod: CPTII,S$GLB,, | Performed by: STUDENT IN AN ORGANIZED HEALTH CARE EDUCATION/TRAINING PROGRAM

## 2024-07-18 PROCEDURE — 1101F PT FALLS ASSESS-DOCD LE1/YR: CPT | Mod: CPTII,S$GLB,, | Performed by: STUDENT IN AN ORGANIZED HEALTH CARE EDUCATION/TRAINING PROGRAM

## 2024-07-18 PROCEDURE — 99999 PR PBB SHADOW E&M-EST. PATIENT-LVL IV: CPT | Mod: PBBFAC,,, | Performed by: STUDENT IN AN ORGANIZED HEALTH CARE EDUCATION/TRAINING PROGRAM

## 2024-07-18 PROCEDURE — 1125F AMNT PAIN NOTED PAIN PRSNT: CPT | Mod: CPTII,S$GLB,, | Performed by: STUDENT IN AN ORGANIZED HEALTH CARE EDUCATION/TRAINING PROGRAM

## 2024-07-18 PROCEDURE — 3288F FALL RISK ASSESSMENT DOCD: CPT | Mod: CPTII,S$GLB,, | Performed by: STUDENT IN AN ORGANIZED HEALTH CARE EDUCATION/TRAINING PROGRAM

## 2024-07-18 RX ORDER — PREGABALIN 75 MG/1
75 CAPSULE ORAL 2 TIMES DAILY
Qty: 60 CAPSULE | Refills: 1 | Status: SHIPPED | OUTPATIENT
Start: 2024-07-18 | End: 2024-09-16

## 2024-07-22 ENCOUNTER — OFFICE VISIT (OUTPATIENT)
Dept: CARDIOLOGY | Facility: CLINIC | Age: 64
End: 2024-07-22
Payer: MEDICARE

## 2024-07-22 VITALS
HEART RATE: 102 BPM | OXYGEN SATURATION: 100 % | BODY MASS INDEX: 26.82 KG/M2 | WEIGHT: 145.75 LBS | DIASTOLIC BLOOD PRESSURE: 96 MMHG | SYSTOLIC BLOOD PRESSURE: 143 MMHG | HEIGHT: 62 IN

## 2024-07-22 DIAGNOSIS — I47.0 RE-ENTRY VENTRICULAR ARRHYTHMIA: ICD-10-CM

## 2024-07-22 DIAGNOSIS — R07.9 CHEST PAIN, UNSPECIFIED TYPE: ICD-10-CM

## 2024-07-22 DIAGNOSIS — I25.10 ATHEROSCLEROSIS OF NATIVE CORONARY ARTERY OF NATIVE HEART WITHOUT ANGINA PECTORIS: ICD-10-CM

## 2024-07-22 DIAGNOSIS — R55 NEAR SYNCOPE: ICD-10-CM

## 2024-07-22 PROCEDURE — 3077F SYST BP >= 140 MM HG: CPT | Mod: CPTII,S$GLB,, | Performed by: INTERNAL MEDICINE

## 2024-07-22 PROCEDURE — 99999 PR PBB SHADOW E&M-EST. PATIENT-LVL III: CPT | Mod: PBBFAC,,, | Performed by: INTERNAL MEDICINE

## 2024-07-22 PROCEDURE — 3080F DIAST BP >= 90 MM HG: CPT | Mod: CPTII,S$GLB,, | Performed by: INTERNAL MEDICINE

## 2024-07-22 PROCEDURE — 1101F PT FALLS ASSESS-DOCD LE1/YR: CPT | Mod: CPTII,S$GLB,, | Performed by: INTERNAL MEDICINE

## 2024-07-22 PROCEDURE — 3008F BODY MASS INDEX DOCD: CPT | Mod: CPTII,S$GLB,, | Performed by: INTERNAL MEDICINE

## 2024-07-22 PROCEDURE — 1159F MED LIST DOCD IN RCRD: CPT | Mod: CPTII,S$GLB,, | Performed by: INTERNAL MEDICINE

## 2024-07-22 PROCEDURE — 3288F FALL RISK ASSESSMENT DOCD: CPT | Mod: CPTII,S$GLB,, | Performed by: INTERNAL MEDICINE

## 2024-07-22 PROCEDURE — 93000 ELECTROCARDIOGRAM COMPLETE: CPT | Mod: S$GLB,,, | Performed by: STUDENT IN AN ORGANIZED HEALTH CARE EDUCATION/TRAINING PROGRAM

## 2024-07-22 PROCEDURE — 99204 OFFICE O/P NEW MOD 45 MIN: CPT | Mod: S$GLB,,, | Performed by: INTERNAL MEDICINE

## 2024-07-22 RX ORDER — COLCHICINE 0.6 MG/1
0.6 TABLET ORAL DAILY
Qty: 30 TABLET | Refills: 11 | Status: SHIPPED | OUTPATIENT
Start: 2024-07-22 | End: 2025-07-22
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
## 2024-07-23 LAB
OHS QRS DURATION: 82 MS
OHS QTC CALCULATION: 462 MS

## 2024-07-31 ENCOUNTER — TELEPHONE (OUTPATIENT)
Dept: PAIN MEDICINE | Facility: CLINIC | Age: 64
End: 2024-07-31
Payer: MEDICARE

## 2024-08-26 ENCOUNTER — TELEPHONE (OUTPATIENT)
Dept: CARDIOLOGY | Facility: CLINIC | Age: 64
End: 2024-08-26
Payer: MEDICARE

## 2024-08-26 NOTE — TELEPHONE ENCOUNTER
Let pt know we will r/s her stress test and will call her back tomorrow    ----- Message from Carmela Vidales sent at 8/26/2024 12:59 PM CDT -----  Type:  Reschedule Appointment Request     Name of Caller:pt  When is the first available appointment?No access  Symptoms:2 nm mpi and stress test  Would the patient rather a call back or a response via [facility]? Call back  Best Call Back Number:[phone]   Additional Information: pt needs to reschedule appointment for testing. Pt at advised she was unaware of the appointment and need to reschedule.

## 2024-08-27 NOTE — TELEPHONE ENCOUNTER
Rescheduled test and follow up appointment for patient.    Reached out to patient with dates and times.  No answer.  Left detailed message on VM.  Mailed reminder letters for all appointments.

## 2024-09-24 ENCOUNTER — HOSPITAL ENCOUNTER (OUTPATIENT)
Dept: CARDIOLOGY | Facility: HOSPITAL | Age: 64
Discharge: HOME | End: 2024-09-24
Attending: INTERNAL MEDICINE
Payer: MEDICARE

## 2024-09-24 VITALS — WEIGHT: 145 LBS | BODY MASS INDEX: 26.68 KG/M2 | HEIGHT: 62 IN

## 2024-09-24 DIAGNOSIS — R07.9 CHEST PAIN, UNSPECIFIED TYPE: ICD-10-CM

## 2024-09-24 LAB
APICAL FOUR CHAMBER EJECTION FRACTION: 59 %
APICAL TWO CHAMBER EJECTION FRACTION: 68 %
ASCENDING AORTA: 2.86 CM
AV INDEX (PROSTH): 1.04
AV MEAN GRADIENT: 4 MMHG
AV PEAK GRADIENT: 7 MMHG
AV VALVE AREA BY VELOCITY RATIO: 2.73 CM²
AV VALVE AREA: 2.99 CM²
AV VELOCITY RATIO: 0.95
BSA FOR ECHO PROCEDURE: 1.7 M2
CV ECHO LV RWT: 0.39 CM
DOP CALC AO PEAK VEL: 1.31 M/S
DOP CALC AO VTI: 22.9 CM
DOP CALC LVOT AREA: 2.9 CM2
DOP CALC LVOT DIAMETER: 1.91 CM
DOP CALC LVOT PEAK VEL: 1.25 M/S
DOP CALC LVOT STROKE VOLUME: 68.44 CM3
DOP CALC MV VTI: 16.4 CM
DOP CALCLVOT PEAK VEL VTI: 23.9 CM
E WAVE DECELERATION TIME: 215.79 MSEC
E/A RATIO: 0.68
E/E' RATIO: 13 M/S
ECHO LV POSTERIOR WALL: 0.76 CM (ref 0.6–1.1)
FRACTIONAL SHORTENING: 25 % (ref 28–44)
INTERVENTRICULAR SEPTUM: 0.82 CM (ref 0.6–1.1)
IVC DIAMETER: 1.39 CM
LEFT ATRIUM AREA SYSTOLIC (APICAL 2 CHAMBER): 9.7 CM2
LEFT ATRIUM AREA SYSTOLIC (APICAL 4 CHAMBER): 8.79 CM2
LEFT ATRIUM SIZE: 2.76 CM
LEFT ATRIUM VOLUME INDEX MOD: 11.3 ML/M2
LEFT ATRIUM VOLUME MOD: 18.83 ML
LEFT INTERNAL DIMENSION IN SYSTOLE: 2.9 CM (ref 2.1–4)
LEFT VENTRICLE DIASTOLIC VOLUME INDEX: 39.1 ML/M2
LEFT VENTRICLE DIASTOLIC VOLUME: 65.29 ML
LEFT VENTRICLE END DIASTOLIC VOLUME APICAL 2 CHAMBER: 55.35 ML
LEFT VENTRICLE END DIASTOLIC VOLUME APICAL 4 CHAMBER: 45.31 ML
LEFT VENTRICLE END SYSTOLIC VOLUME APICAL 2 CHAMBER: 20.99 ML
LEFT VENTRICLE END SYSTOLIC VOLUME APICAL 4 CHAMBER: 16.55 ML
LEFT VENTRICLE MASS INDEX: 52 G/M2
LEFT VENTRICLE SYSTOLIC VOLUME INDEX: 19.2 ML/M2
LEFT VENTRICLE SYSTOLIC VOLUME: 32.13 ML
LEFT VENTRICULAR INTERNAL DIMENSION IN DIASTOLE: 3.88 CM (ref 3.5–6)
LEFT VENTRICULAR MASS: 87.43 G
LV LATERAL E/E' RATIO: 13 M/S
LV SEPTAL E/E' RATIO: 13 M/S
LVED V (TEICH): 65.29 ML
LVES V (TEICH): 32.13 ML
LVOT MG: 3 MMHG
LVOT MV: 0.8 CM/S
MV A" WAVE DURATION": 114.18 MSEC
MV PEAK A VEL: 0.96 M/S
MV PEAK E VEL: 0.65 M/S
MV PEAK GRADIENT: 3 MMHG
MV STENOSIS PRESSURE HALF TIME: 62.58 MS
MV VALVE AREA BY CONTINUITY EQUATION: 4.17 CM2
MV VALVE AREA P 1/2 METHOD: 3.52 CM2
OHS LV EJECTION FRACTION SIMPSONS BIPLANE MOD: 65 %
PISA TR MAX VEL: 2.02 M/S
PV MV: 0.63 M/S
PV PEAK GRADIENT: 2 MMHG
PV PEAK VELOCITY: 0.77 M/S
RA PRESSURE ESTIMATED: 3 MMHG
RA VOL SYS: 14.3 ML
RIGHT ATRIAL AREA: 7.3 CM2
RIGHT ATRIUM VOLUME AREA LENGTH APICAL 4 CHAMBER: 12.71 ML
RV TB RVSP: 5 MMHG
RV TISSUE DOPPLER FREE WALL SYSTOLIC VELOCITY 1 (APICAL 4 CHAMBER VIEW): 14.75 CM/S
SINUS: 2.96 CM
STJ: 2.7 CM
TDI LATERAL: 0.05 M/S
TDI SEPTAL: 0.05 M/S
TDI: 0.05 M/S
TR MAX PG: 16 MMHG
TRICUSPID ANNULAR PLANE SYSTOLIC EXCURSION: 1.92 CM
TV REST PULMONARY ARTERY PRESSURE: 19 MMHG
Z-SCORE OF LEFT VENTRICULAR DIMENSION IN END DIASTOLE: -1.85
Z-SCORE OF LEFT VENTRICULAR DIMENSION IN END SYSTOLE: 0.02

## 2024-09-24 PROCEDURE — 93306 TTE W/DOPPLER COMPLETE: CPT | Mod: 26,,, | Performed by: INTERNAL MEDICINE

## 2024-09-24 PROCEDURE — 93306 TTE W/DOPPLER COMPLETE: CPT

## 2024-10-03 ENCOUNTER — HOSPITAL ENCOUNTER (OUTPATIENT)
Dept: RADIOLOGY | Facility: HOSPITAL | Age: 64
Discharge: HOME | End: 2024-10-03
Attending: INTERNAL MEDICINE
Payer: MEDICARE

## 2024-10-03 ENCOUNTER — HOSPITAL ENCOUNTER (OUTPATIENT)
Dept: CARDIOLOGY | Facility: HOSPITAL | Age: 64
Discharge: HOME | End: 2024-10-03
Attending: INTERNAL MEDICINE
Payer: MEDICARE

## 2024-10-03 DIAGNOSIS — R07.9 CHEST PAIN, UNSPECIFIED TYPE: ICD-10-CM

## 2024-10-03 DIAGNOSIS — I47.0 RE-ENTRY VENTRICULAR ARRHYTHMIA: ICD-10-CM

## 2024-10-03 DIAGNOSIS — I25.10 ATHEROSCLEROSIS OF NATIVE CORONARY ARTERY OF NATIVE HEART WITHOUT ANGINA PECTORIS: ICD-10-CM

## 2024-10-03 LAB
CV STRESS BASE HR: 76 BPM
DIASTOLIC BLOOD PRESSURE: 85 MMHG
OHS CV CPX 85 PERCENT MAX PREDICTED HEART RATE MALE: 132
OHS CV CPX MAX PREDICTED HEART RATE: 155
OHS CV CPX PATIENT IS FEMALE: 1
OHS CV CPX PATIENT IS MALE: 0
OHS CV CPX PEAK DIASTOLIC BLOOD PRESSURE: 75 MMHG
OHS CV CPX PEAK HEAR RATE: 112 BPM
OHS CV CPX PEAK RATE PRESSURE PRODUCT: NORMAL
OHS CV CPX PEAK SYSTOLIC BLOOD PRESSURE: 120 MMHG
OHS CV CPX PERCENT MAX PREDICTED HEART RATE ACHIEVED: 75
OHS CV CPX RATE PRESSURE PRODUCT PRESENTING: 8740
SYSTOLIC BLOOD PRESSURE: 115 MMHG

## 2024-10-03 PROCEDURE — 78452 HT MUSCLE IMAGE SPECT MULT: CPT | Mod: TC

## 2024-10-03 PROCEDURE — 93018 CV STRESS TEST I&R ONLY: CPT | Mod: ,,, | Performed by: INTERNAL MEDICINE

## 2024-10-03 PROCEDURE — 93017 CV STRESS TEST TRACING ONLY: CPT

## 2024-10-03 PROCEDURE — 63600175 PHARM REV CODE 636 W HCPCS: Performed by: INTERNAL MEDICINE

## 2024-10-03 PROCEDURE — 93016 CV STRESS TEST SUPVJ ONLY: CPT | Mod: ,,, | Performed by: INTERNAL MEDICINE

## 2024-10-03 PROCEDURE — A9502 TC99M TETROFOSMIN: HCPCS | Performed by: INTERNAL MEDICINE

## 2024-10-03 PROCEDURE — 78452 HT MUSCLE IMAGE SPECT MULT: CPT | Mod: 26,,, | Performed by: STUDENT IN AN ORGANIZED HEALTH CARE EDUCATION/TRAINING PROGRAM

## 2024-10-03 RX ORDER — REGADENOSON 0.08 MG/ML
0.4 INJECTION, SOLUTION INTRAVENOUS ONCE
Status: COMPLETED | OUTPATIENT
Start: 2024-10-03 | End: 2024-10-03

## 2024-10-03 RX ADMIN — TETROFOSMIN 10 MILLICURIE: 1.38 INJECTION, POWDER, LYOPHILIZED, FOR SOLUTION INTRAVENOUS at 09:10

## 2024-10-03 RX ADMIN — REGADENOSON 0.4 MG: 0.08 INJECTION, SOLUTION INTRAVENOUS at 11:10

## 2024-10-03 RX ADMIN — TETROFOSMIN 30 MILLICURIE: 1.38 INJECTION, POWDER, LYOPHILIZED, FOR SOLUTION INTRAVENOUS at 11:10

## 2024-10-07 ENCOUNTER — OFFICE VISIT (OUTPATIENT)
Dept: CARDIOLOGY | Facility: CLINIC | Age: 64
End: 2024-10-07
Payer: MEDICARE

## 2024-10-07 ENCOUNTER — TELEPHONE (OUTPATIENT)
Dept: PAIN MEDICINE | Facility: CLINIC | Age: 64
End: 2024-10-07
Payer: MEDICARE

## 2024-10-07 VITALS
BODY MASS INDEX: 27.47 KG/M2 | HEIGHT: 62 IN | SYSTOLIC BLOOD PRESSURE: 113 MMHG | OXYGEN SATURATION: 98 % | DIASTOLIC BLOOD PRESSURE: 75 MMHG | WEIGHT: 149.25 LBS

## 2024-10-07 DIAGNOSIS — R07.81 RIB PAIN ON LEFT SIDE: Primary | ICD-10-CM

## 2024-10-07 DIAGNOSIS — R07.9 CHEST PAIN, UNSPECIFIED TYPE: ICD-10-CM

## 2024-10-07 DIAGNOSIS — I10 HYPERTENSION, UNSPECIFIED TYPE: ICD-10-CM

## 2024-10-07 PROCEDURE — 3288F FALL RISK ASSESSMENT DOCD: CPT | Mod: CPTII,S$GLB,, | Performed by: INTERNAL MEDICINE

## 2024-10-07 PROCEDURE — 3008F BODY MASS INDEX DOCD: CPT | Mod: CPTII,S$GLB,, | Performed by: INTERNAL MEDICINE

## 2024-10-07 PROCEDURE — 99214 OFFICE O/P EST MOD 30 MIN: CPT | Mod: S$GLB,,, | Performed by: INTERNAL MEDICINE

## 2024-10-07 PROCEDURE — 1125F AMNT PAIN NOTED PAIN PRSNT: CPT | Mod: CPTII,S$GLB,, | Performed by: INTERNAL MEDICINE

## 2024-10-07 PROCEDURE — 1101F PT FALLS ASSESS-DOCD LE1/YR: CPT | Mod: CPTII,S$GLB,, | Performed by: INTERNAL MEDICINE

## 2024-10-07 PROCEDURE — 3078F DIAST BP <80 MM HG: CPT | Mod: CPTII,S$GLB,, | Performed by: INTERNAL MEDICINE

## 2024-10-07 PROCEDURE — 99999 PR PBB SHADOW E&M-EST. PATIENT-LVL III: CPT | Mod: PBBFAC,,, | Performed by: INTERNAL MEDICINE

## 2024-10-07 PROCEDURE — 1159F MED LIST DOCD IN RCRD: CPT | Mod: CPTII,S$GLB,, | Performed by: INTERNAL MEDICINE

## 2024-10-07 PROCEDURE — 3074F SYST BP LT 130 MM HG: CPT | Mod: CPTII,S$GLB,, | Performed by: INTERNAL MEDICINE

## 2024-10-07 RX ORDER — DULOXETIN HYDROCHLORIDE 30 MG/1
30 CAPSULE, DELAYED RELEASE ORAL DAILY
COMMUNITY
Start: 2024-06-18
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
## 2024-11-26 ENCOUNTER — HOSPITAL ENCOUNTER (EMERGENCY)
Facility: HOSPITAL | Age: 64
Discharge: HOME | End: 2024-11-26
Attending: EMERGENCY MEDICINE
Payer: MEDICARE

## 2024-11-26 VITALS
BODY MASS INDEX: 25.76 KG/M2 | RESPIRATION RATE: 16 BRPM | HEART RATE: 86 BPM | HEIGHT: 62 IN | OXYGEN SATURATION: 98 % | SYSTOLIC BLOOD PRESSURE: 135 MMHG | DIASTOLIC BLOOD PRESSURE: 89 MMHG | WEIGHT: 140 LBS | TEMPERATURE: 98 F

## 2024-11-26 DIAGNOSIS — R14.3 FLATULENCE/WIND: Primary | ICD-10-CM

## 2024-11-26 LAB
ALBUMIN SERPL BCP-MCNC: 4 G/DL (ref 3.5–5.2)
ALP SERPL-CCNC: 106 U/L (ref 40–150)
ALT SERPL W/O P-5'-P-CCNC: 17 U/L (ref 10–44)
ANION GAP SERPL CALC-SCNC: 11 MMOL/L (ref 8–16)
AST SERPL-CCNC: 17 U/L (ref 10–40)
BASOPHILS # BLD AUTO: 0.07 K/UL (ref 0–0.2)
BASOPHILS NFR BLD: 1 % (ref 0–1.9)
BILIRUB SERPL-MCNC: 0.5 MG/DL (ref 0.1–1)
BILIRUB UR QL STRIP: NEGATIVE
BUN SERPL-MCNC: 14 MG/DL (ref 8–23)
CALCIUM SERPL-MCNC: 9 MG/DL (ref 8.7–10.5)
CHLORIDE SERPL-SCNC: 110 MMOL/L (ref 95–110)
CLARITY UR: CLEAR
CO2 SERPL-SCNC: 20 MMOL/L (ref 23–29)
COLOR UR: COLORLESS
CREAT SERPL-MCNC: 0.8 MG/DL (ref 0.5–1.4)
DIFFERENTIAL METHOD BLD: ABNORMAL
EOSINOPHIL # BLD AUTO: 0.2 K/UL (ref 0–0.5)
EOSINOPHIL NFR BLD: 3.1 % (ref 0–8)
ERYTHROCYTE [DISTWIDTH] IN BLOOD BY AUTOMATED COUNT: 13.3 % (ref 11.5–14.5)
EST. GFR  (NO RACE VARIABLE): >60 ML/MIN/1.73 M^2
GLUCOSE SERPL-MCNC: 102 MG/DL (ref 70–110)
GLUCOSE UR QL STRIP: NEGATIVE
HCT VFR BLD AUTO: 40.2 % (ref 37–48.5)
HGB BLD-MCNC: 13.9 G/DL (ref 12–16)
HGB UR QL STRIP: NEGATIVE
IMM GRANULOCYTES # BLD AUTO: 0.01 K/UL (ref 0–0.04)
IMM GRANULOCYTES NFR BLD AUTO: 0.1 % (ref 0–0.5)
KETONES UR QL STRIP: NEGATIVE
LEUKOCYTE ESTERASE UR QL STRIP: NEGATIVE
LIPASE SERPL-CCNC: 17 U/L (ref 4–60)
LYMPHOCYTES # BLD AUTO: 1.1 K/UL (ref 1–4.8)
LYMPHOCYTES NFR BLD: 16.3 % (ref 18–48)
MCH RBC QN AUTO: 29.4 PG (ref 27–31)
MCHC RBC AUTO-ENTMCNC: 34.6 G/DL (ref 32–36)
MCV RBC AUTO: 85 FL (ref 82–98)
MONOCYTES # BLD AUTO: 0.5 K/UL (ref 0.3–1)
MONOCYTES NFR BLD: 6.9 % (ref 4–15)
NEUTROPHILS # BLD AUTO: 5 K/UL (ref 1.8–7.7)
NEUTROPHILS NFR BLD: 72.6 % (ref 38–73)
NITRITE UR QL STRIP: NEGATIVE
NRBC BLD-RTO: 0 /100 WBC
PH UR STRIP: 5 [PH] (ref 5–8)
PLATELET # BLD AUTO: 276 K/UL (ref 150–450)
PMV BLD AUTO: 9.1 FL (ref 9.2–12.9)
POTASSIUM SERPL-SCNC: 3.9 MMOL/L (ref 3.5–5.1)
PROT SERPL-MCNC: 7.6 G/DL (ref 6–8.4)
PROT UR QL STRIP: NEGATIVE
RBC # BLD AUTO: 4.73 M/UL (ref 4–5.4)
SODIUM SERPL-SCNC: 141 MMOL/L (ref 136–145)
SP GR UR STRIP: 1 (ref 1–1.03)
URN SPEC COLLECT METH UR: ABNORMAL
UROBILINOGEN UR STRIP-ACNC: NEGATIVE EU/DL
WBC # BLD AUTO: 6.86 K/UL (ref 3.9–12.7)

## 2024-11-26 PROCEDURE — 83690 ASSAY OF LIPASE: CPT | Performed by: EMERGENCY MEDICINE

## 2024-11-26 PROCEDURE — 80053 COMPREHEN METABOLIC PANEL: CPT | Performed by: EMERGENCY MEDICINE

## 2024-11-26 PROCEDURE — 25000003 PHARM REV CODE 250: Performed by: EMERGENCY MEDICINE

## 2024-11-26 PROCEDURE — 81003 URINALYSIS AUTO W/O SCOPE: CPT | Performed by: EMERGENCY MEDICINE

## 2024-11-26 PROCEDURE — 25000003 PHARM REV CODE 250

## 2024-11-26 PROCEDURE — 85025 COMPLETE CBC W/AUTO DIFF WBC: CPT | Performed by: EMERGENCY MEDICINE

## 2024-11-26 PROCEDURE — 99284 EMERGENCY DEPT VISIT MOD MDM: CPT | Mod: 25

## 2024-11-26 RX ORDER — SIMETHICONE 80 MG
80 TABLET,CHEWABLE ORAL
Status: COMPLETED | OUTPATIENT
Start: 2024-11-26 | End: 2024-11-26

## 2024-11-26 RX ORDER — ACETAMINOPHEN 500 MG
1000 TABLET ORAL
Status: COMPLETED | OUTPATIENT
Start: 2024-11-26 | End: 2024-11-26

## 2024-11-26 RX ORDER — DICYCLOMINE HYDROCHLORIDE 20 MG/1
20 TABLET ORAL 2 TIMES DAILY PRN
Qty: 20 TABLET | Refills: 0 | Status: SHIPPED | OUTPATIENT
Start: 2024-11-26 | End: 2024-12-26

## 2024-11-26 RX ADMIN — ACETAMINOPHEN 1000 MG: 500 TABLET ORAL at 03:11

## 2024-11-26 RX ADMIN — SIMETHICONE 80 MG: 80 TABLET, CHEWABLE ORAL at 01:11

## 2024-11-26 NOTE — ED NOTES
Pt reports to ED with c/o excess gas with foul smell x's 2 months. Pt states that she was seen at urgent care 30 days ago and was told to stop taking gas-x and to start taking bean-o. Pt states that she has been taking the bean-o for a month now and symptoms have worsened. Pt states that she notified her PCP and was advised that her PCP was not in today and she should be evaluated in ED      Adult Physical Assessment  LOC: 63 y.o. female verified via two identifiers.  The patient is awake, alert & oriented to person, place & time. No acute distress noted at this time, pt is speaking appropriately at this time.  APPEARANCE: Patient resting comfortably and appears to be in no acute distress at this time. Patient is clean and well groomed, patient's clothing is properly fastened.  SKIN:The skin is warm, dry & intact. Color consistent with ethnicity, patient has normal skin turgor and moist mucus membranes, skin intact, no breakdown or brusing noted.  MUSCULOSKELETAL: Patient moving all extremities well, no obvious swelling or deformities noted.  RESPIRATORY: Airway is open and patent, respirations are spontaneous, even, and non-labored patient has a normal effort and rate, no accessory muscle use noted.     CARDIAC: Patient has a normal rate and rhythm, no periphreal edema noted in any extremity, capillary refill < 3 seconds in all extremities  ABDOMEN: Soft and non tender to palpation, no abdominal distention noted. Pt states excessive gas that has a bad smell, states that it is intermittent throughout the day and night. Denies N/V states that she started with diarrhea yesterday  NEUROLOGIC: Sensation is intact. Eyes open spontaneously, behavior appropriate to situation, follows commands. Speech is clear and appropriate. Facial expression symmetrical, bilateral hand grasp equal and even. No bilateral lower extremity edema.

## 2024-11-26 NOTE — ED PROVIDER NOTES
Encounter Date: 11/26/2024       History     Chief Complaint   Patient presents with    GI Problem     Pt reports increased gas x2 months. Pt reports excessively passing gas w/ loose stool. Pt was taking Gas-x and stopped. Sx have increased since. Denies other sx or complaints.      Patient is a 63-year-old female with a past history of GERD, hypertension, anxiety, vertebral artery stenosis currently on Plavix who presents to the ED with multiple complaints.  Patient states that she has been having excessive flatulence for the past 30-60 days despite use of over-the-counter Beano.  She states that she will have excessive periods of flatulence as described as very foul-smelling despite use of the aforementioned medication.  She denies any abdominal pain.  She denies any abnormalities in her stool, unintentional weight loss.  She denies any history of lactose intolerance or gluten sensitivity.  Furthermore, she denies any alarm features such as nocturnal abdominal pain, unintentional weight loss, fever, steatorrhea, vomiting, melena or hematochezia.      She has not been seen by any physician or other health care provider for this condition despite it persisting for approximately 1-2 months.  Additionally the patient reports spontaneous bruising to the bilateral lower extremities.   She denies any trauma and does report compliance with the aforementioned Plavix.  She denies any acute headache, numbness tingling, abnormal bleeding,  chest pain shortness of breath or palpitations.      Review of patient's allergies indicates:  No Known Allergies  Past Medical History:   Diagnosis Date    Anxiety     Arthritis     COPD (chronic obstructive pulmonary disease)     Depression     GERD (gastroesophageal reflux disease)     Hypertension      Past Surgical History:   Procedure Laterality Date    COLONOSCOPY N/A 12/6/2018    Procedure: COLONOSCOPY;  Surgeon: Endy Moncada MD;  Location: [facility] ([address] [place] [address]);  Service:  Endoscopy;  Laterality: N/A;  PM Prep - sm    ESOPHAGOGASTRODUODENOSCOPY N/A 3/10/2020    Procedure: EGD (ESOPHAGOGASTRODUODENOSCOPY);  Surgeon: Derrell Kovacs MD;  Location: [address];  Service: Endoscopy;  Laterality: N/A;     Family History   Problem Relation Name Age of Onset    Cancer Mother      Cancer Brother      Diabetes Maternal Uncle      Colon cancer Neg Hx      Stomach cancer Neg Hx      Inflammatory bowel disease Neg Hx       Social History     Tobacco Use    Smoking status: Former     Current packs/day: 0.00     Types: Cigarettes     Quit date: 1995     Years since quittin.9    Smokeless tobacco: Never   Substance Use Topics    Alcohol use: Yes     Comment: weekends    Drug use: No     Review of Systems   Constitutional:  Negative for appetite change, chills, fatigue and fever.   Eyes:  Negative for redness and visual disturbance.   Respiratory:  Negative for chest tightness and shortness of breath.    Cardiovascular:  Negative for chest pain, palpitations and leg swelling.   Gastrointestinal:  Negative for abdominal distention, abdominal pain, anal bleeding, blood in stool, constipation, diarrhea, nausea, rectal pain and vomiting.        Excessive flatulence   Genitourinary:  Negative for dysuria, flank pain, frequency, pelvic pain, urgency, vaginal bleeding, vaginal discharge and vaginal pain.   Musculoskeletal:  Negative for gait problem, myalgias and neck pain.   Neurological:  Negative for dizziness, tremors, weakness, light-headedness and headaches.   Psychiatric/Behavioral:  Negative for agitation and confusion.        Physical Exam     Initial Vitals [24 1200]   BP Pulse Resp Temp SpO2   (!) 146/92 98 20 98.4 °F (36.9 °C) 95 %      MAP       --         Physical Exam    Nursing note and vitals reviewed.  Constitutional: She appears well-developed and well-nourished. No distress.   Well-appearing no distress noted; patient is anxious appearing, however   HENT:    Head: Normocephalic and atraumatic.   Eyes: Conjunctivae and EOM are normal. Pupils are equal, round, and reactive to light.   Neck:   Normal range of motion.  Cardiovascular:  Normal rate, regular rhythm, normal heart sounds and intact distal pulses.           Pulmonary/Chest: Breath sounds normal.   Abdominal: Abdomen is soft. Bowel sounds are normal. She exhibits no distension. There is no abdominal tenderness.   The abdomen is obese, soft nondistended nontender on exam; no rebound; no guarding; no cutaneous abnormalities appreciated on exam  There is no rebound.   Musculoskeletal:         General: Normal range of motion.      Cervical back: Normal range of motion.     Neurological: She is alert and oriented to person, place, and time. She has normal strength. GCS score is 15. GCS eye subscore is 4. GCS verbal subscore is 5. GCS motor subscore is 6.   Skin: Skin is warm. Capillary refill takes less than 2 seconds.   Psychiatric: She has a normal mood and affect. Thought content normal.         ED Course   Procedures  Labs Reviewed   CBC W/ AUTO DIFFERENTIAL - Abnormal       Result Value    WBC 6.86      RBC 4.73      Hemoglobin 13.9      Hematocrit 40.2      MCV 85      MCH 29.4      MCHC 34.6      RDW 13.3      Platelets 276      MPV 9.1 (*)     Immature Granulocytes 0.1      Gran # (ANC) 5.0      Immature Grans (Abs) 0.01      Lymph # 1.1      Mono # 0.5      Eos # 0.2      Baso # 0.07      nRBC 0      Gran % 72.6      Lymph % 16.3 (*)     Mono % 6.9      Eosinophil % 3.1      Basophil % 1.0      Differential Method Automated     COMPREHENSIVE METABOLIC PANEL - Abnormal    Sodium 141      Potassium 3.9      Chloride 110      CO2 20 (*)     Glucose 102      BUN 14      Creatinine 0.8      Calcium 9.0      Total Protein 7.6      Albumin 4.0      Total Bilirubin 0.5      Alkaline Phosphatase 106      AST 17      ALT 17      eGFR >60      Anion Gap 11     URINALYSIS, REFLEX TO URINE CULTURE - Abnormal    Specimen  UA Urine, Clean Catch      Color, UA Colorless (*)     Appearance, UA Clear      pH, UA 5.0      Specific Gravity, UA 1.005      Protein, UA Negative      Glucose, UA Negative      Ketones, UA Negative      Bilirubin (UA) Negative      Occult Blood UA Negative      Nitrite, UA Negative      Urobilinogen, UA Negative      Leukocytes, UA Negative      Narrative:     Specimen Source->Urine   LIPASE    Lipase 17            Imaging Results              CT Abdomen Pelvis  Without Contrast (Final result)  Result time 11/26/24 14:56:07      Final result by Bernabe Selby MD (11/26/24 14:56:07)                   Impression:      1. No acute process seen on this noncontrast study.  Specifically, no evidence of bowel obstruction or acute bowel inflammation.  2. Right renal 2 mm nonobstructing nephrolith, not definitively seen on previous imaging.  3. Grossly stable 2 cm left adrenal nodule with previous imaging characteristics consistent with benign adenoma.  4. Right renal simple appearing cyst, unchanged.  5. Atherosclerosis.  6. Other incidental/nonemergent findings in the body of the report.      Electronically signed by: Bernabe Selby MD  Date:    11/26/2024  Time:    14:56               Narrative:    EXAMINATION:  CT ABDOMEN PELVIS WITHOUT CONTRAST    CLINICAL HISTORY:  Bowel obstruction suspected;    TECHNIQUE:  Low dose axial images, sagittal and coronal reformations were obtained from the lung bases to the pubic symphysis.  No contrast media utilized.    COMPARISON:  CT abdomen and pelvis 10/03/2022, right upper quadrant ultrasound 12/18/2019; report only from outside facility pelvic ultrasound 07/13/2012    FINDINGS:  Lack of IV contrast limits evaluation of soft tissue and vascular structures.    Imaged lung bases show scattered areas of platelike scarring versus atelectasis without consolidation or pleural effusion.  Base of the heart is within normal limits.    Noncontrast appearance of the liver, gallbladder,  pancreas, spleen, stomach, duodenum and right adrenal gland are within normal limits.  2 cm low-density left adrenal nodule not significantly changed from prior and previously favored to represent benign adenoma on prior imaging.  No significant dilatation.    Bilateral kidneys are normal in overall size, shape and location.  2 mm nephrolith at the right renal upper pole.  No radiopaque calculus seen within the right ureter, left collecting system or urinary bladder.  1.3 cm simple appearing parenchymal cyst at the medial left renal interpolar region similar to prior.  No hydronephrosis or significant perinephric stranding.  Ureters are normal in course and caliber.  Urinary bladder is well distended without wall thickening or adjacent inflammatory change.    Noncontrast appearance of the uterus and bilateral adnexa are within normal limits.  No significant volume free pelvic fluid.  Few scattered punctate pelvic phleboliths noted.    Appendix and terminal ileum are within normal limits.  No evidence of bowel obstruction or acute bowel inflammation.  No bowel pneumatosis or portal venous gas.    No ascites, free air or lymphadenopathy by CT criteria.  Minimal to mild scattered calcific atherosclerosis of the abdominal aorta extending into its iliac branches.  Aorta tapers normally.    Extraperitoneal soft tissues and osseous structures appear grossly stable without acute findings.                                       Medications   simethicone chewable tablet 80 mg (80 mg Oral Given 11/26/24 1341)   acetaminophen tablet 1,000 mg (1,000 mg Oral Given 11/26/24 1530)     Medical Decision Making             ED Course as of 11/27/24 1113   Tue Nov 26, 2024   1304 Platelet Count: 276 [LC]   1304 WBC: 6.86 [LC]   1304 Hemoglobin: 13.9 [LC]   1304 Hematocrit: 40.2 [LC]   1427 Lipase: 17 [LC]   1427 Glucose, UA: Negative [LC]   1427 Bilirubin (UA): Negative [LC]   1427 Blood, UA: Negative [LC]   1427 NITRITE UA: Negative  [LC]      ED Course User Index  [LC] Bean Saravia MD               Medical Decision Making:   Initial Assessment:   See HPI See HPI   Clinical Tests:   Lab Tests: Reviewed and Ordered  Radiological Study: Ordered and Reviewed  ED Management:  - ED workup largely benign  - no acute process identified  - will treat as an OP with rx for simethecone, Bentyl  - pt would likely benefit from GI consultation as an OP; an ambulatory referral has been placed  - pt comfortable with plan for discharge and OP f/u as indicated above  - No further intervention is indicated at this time after having taken into account the patient's history, physical exam findings, and empirical and objective data obtained during the patient's emergency department workup.   - The patient is at low risk for an emergent medical condition at this time, and I am of the belief that that it is safe to discharge the patient from the emergency department.   - The patient is instructed to follow up as outpatient as indicated on the discharge paperwork.    - I have discussed the specifics of the workup with the patient and the patient has verbalized understanding of the details of the workup, the diagnosis, the treatment plan, and the need for outpatient follow-up.    - Although the patient has no emergent etiology today this does not preclude the development of an emergent condition so, in addition, I have advised the patient that they can return to the ED and/or activate EMS at any time with worsening of their symptoms, change of their symptoms, or with any other medical complaint.    - The patient remained comfortable and stable during their visit in the ED.    - Discharge and follow-up instructions discussed with the patient who expressed understanding and willingness to comply with my recommendations.  - Results of all emergency department tests  discussed thoroughly with patient; all patient questions answered; pt in agreement with plan  - Pt  instructed to follow up with PCP in 2-3 days for recheck of today's complaints  - Pt given strict emergency department return precautions for any new or worsening of symptoms  - Pt discharged from the emergency department in stable condition, in no acute distress                Clinical Impression:  Final diagnoses:  [R14.3] Flatulence/wind (Primary)          ED Disposition Condition    Discharge Stable          ED Prescriptions       Medication Sig Dispense Start Date End Date Auth. Provider    simethicone 42 mg Chew Take 1 Dose by mouth 2 (two) times daily as needed. 60 tablet 11/26/2024 -- Bean Saravia MD    dicyclomine (BENTYL) 20 mg tablet Take 1 tablet (20 mg total) by mouth 2 (two) times daily as needed (abdominal cramping). 20 tablet 11/26/2024 12/26/2024 Bean Saravia MD          Follow-up Information       Follow up With Specialties Details Why Contact Info    Brando Palacios MD Gastroenterology Schedule an appointment as soon as possible for a visit   [address] [facility]  [address]  [facility] [id number] [phone]               Bean Saravia MD  11/27/24 5084

## 2024-12-05 ENCOUNTER — TELEPHONE (OUTPATIENT)
Dept: GASTROENTEROLOGY | Facility: CLINIC | Age: 64
End: 2024-12-05
Payer: MEDICARE

## 2024-12-22 ENCOUNTER — HOSPITAL ENCOUNTER (EMERGENCY)
Facility: HOSPITAL | Age: 64
Discharge: LEFT BEFORE DISCHARGE | End: 2024-12-22
Attending: STUDENT IN AN ORGANIZED HEALTH CARE EDUCATION/TRAINING PROGRAM
Payer: MEDICARE

## 2024-12-22 VITALS
HEART RATE: 90 BPM | BODY MASS INDEX: 25.76 KG/M2 | RESPIRATION RATE: 18 BRPM | HEIGHT: 62 IN | WEIGHT: 140 LBS | OXYGEN SATURATION: 94 % | SYSTOLIC BLOOD PRESSURE: 142 MMHG | DIASTOLIC BLOOD PRESSURE: 94 MMHG | TEMPERATURE: 98 F

## 2024-12-22 DIAGNOSIS — J06.9 VIRAL URI: Primary | ICD-10-CM

## 2024-12-22 DIAGNOSIS — R07.9 CHEST PAIN: ICD-10-CM

## 2024-12-22 LAB
INFLUENZA A, MOLECULAR: NEGATIVE
INFLUENZA B, MOLECULAR: NEGATIVE
SARS-COV-2 RDRP RESP QL NAA+PROBE: NEGATIVE
SPECIMEN SOURCE: NORMAL

## 2024-12-22 PROCEDURE — 99283 EMERGENCY DEPT VISIT LOW MDM: CPT | Mod: 25

## 2024-12-22 PROCEDURE — 93010 ELECTROCARDIOGRAM REPORT: CPT | Mod: ,,, | Performed by: INTERNAL MEDICINE

## 2024-12-22 PROCEDURE — 87635 SARS-COV-2 COVID-19 AMP PRB: CPT

## 2024-12-22 PROCEDURE — 87502 INFLUENZA DNA AMP PROBE: CPT

## 2024-12-22 PROCEDURE — 93005 ELECTROCARDIOGRAM TRACING: CPT

## 2024-12-22 NOTE — ED NOTES
Patient now complaining of chest pain and shortness of breath after triage. Charge notified, EKG being done by tech.

## 2024-12-22 NOTE — ED NOTES
64 y.o. female to ED with c.o. sneezing and diarrhea that started today. Patient states she was in the car with her grandchild all day yesterday and is concerned she may have caught something from the child. Patient denies chest pain/ shortness of breath at this time. Patient endorses sinus congestion, denies all other medical complaints at this time. Patient awake, alert, and oriented x 4. No apparent distress noted. VS currently stable. Patient assisted onto stretcher and changed into a gown. Bed placed in low locked position, side rails up x 2, call light is within reach of patient orientation to room and explanation of wait provided to patient, awaiting MD evaluation and orders, plan of care in progress.

## 2024-12-22 NOTE — DISCHARGE INSTRUCTIONS
Ms. Mares,   Rest and stay hydrated.     Below are suggestions for symptomatic relief:              -Tylenol 1000 mg every 5 hours OR ibuprofen 800 mg every 5 hours as needed for pain/fever. You can alternate between tylenol and ibuprofen.               -Hot tea and honey to soothe throat pain.              -Vicks vapor rub at night.              -Delsym helps with coughing at night  We also recommend (DayQuil/ Nyquil) however if you suffer from high blood pressure please use (Coracidin) instead               -Zyrtec-D during the day may help with sinus congestion                Please follow up with your primary care provider within 5-7 days if your signs and symptoms have not resolved or worsen.      If your condition worsens or fails to improve we recommend that you receive another evaluation at the emergency room immediately or contact your primary medical clinic to discuss your concerns.        Thank you for letting me care for you today! It was nice meeting you, and I hope you feel better soon.   If you would like access to your chart and what was done today please utilize the [facility].   Please don't hesitate to return if your symptoms worsen or you develop any other worrisome symptoms.    Our goal in the emergency department is to always give you outstanding care and exceptional service. You may receive a survey by mail or e-mail in the next week regarding your experience in our ED. We would greatly appreciate you completing and returning the survey. Your feedback provides us with a way to recognize our staff who give very good care and it helps us learn how to improve when your experience was below our aspiration of excellence.     Sincerely,    Michelle Cross PA-C  Emergency Department Physician Assistant  [facility], [place], and [facility]

## 2024-12-22 NOTE — ED PROVIDER NOTES
Encounter Date: 12/22/2024       History     Chief Complaint   Patient presents with    General Illness     Patient presents to the ED complaining of sneezing, body aches, and diarrhea that started today.  was in the car all day yesterday with her grandchild who had flu like symptoms. NAD noted.     64-year-old female with past medical history of COPD, arthritis, anxiety, hypertension presents to the ED for further evaluation of general illness x 2 days.  Associated symptoms include generalized body aches, sneezing, when episode of diarrhea.  States she was seen recent contact with her granddaughter who have flu-like symptoms.  Patient has been able to tolerate p.o. patient also notes new onset of chest pain and shortness of breath that occurred prior to arrival. Describes it as pressure on her anterior chest while she was resting.  Unsure if his anxiety related.  Patient denies any palpitations. No shortness attempted prior to arrival.  No fever, chills, abdominal pain.  No other acute complaints today.      The history is provided by the patient.     Review of patient's allergies indicates:  No Known Allergies  Past Medical History:   Diagnosis Date    Anxiety     Arthritis     COPD (chronic obstructive pulmonary disease)     Depression     GERD (gastroesophageal reflux disease)     Hypertension      Past Surgical History:   Procedure Laterality Date    COLONOSCOPY N/A 12/6/2018    Procedure: COLONOSCOPY;  Surgeon: Endy Moncada MD;  Location: [address]);  Service: Endoscopy;  Laterality: N/A;  PM Prep - sm    ESOPHAGOGASTRODUODENOSCOPY N/A 3/10/2020    Procedure: EGD (ESOPHAGOGASTRODUODENOSCOPY);  Surgeon: Derrell Kovacs MD;  Location: [address]);  Service: Endoscopy;  Laterality: N/A;     Family History   Problem Relation Name Age of Onset    Cancer Mother      Cancer Brother      Diabetes Maternal Uncle      Colon cancer Neg Hx      Stomach cancer Neg Hx      Inflammatory bowel  disease Neg Hx       Social History     Tobacco Use    Smoking status: Former     Current packs/day: 0.00     Types: Cigarettes     Quit date: 1995     Years since quittin.0    Smokeless tobacco: Never   Substance Use Topics    Alcohol use: Yes     Comment: weekends    Drug use: No     Review of Systems   Constitutional:  Negative for chills and fever.   HENT:  Positive for congestion and sneezing. Negative for sore throat.    Respiratory:  Negative for shortness of breath and wheezing.    Cardiovascular:  Negative for chest pain.   Gastrointestinal:  Positive for diarrhea. Negative for abdominal distention, nausea and vomiting.   Musculoskeletal:  Negative for neck pain and neck stiffness.       Physical Exam     Initial Vitals [24 1143]   BP Pulse Resp Temp SpO2   (!) 142/94 90 18 98.3 °F (36.8 °C) (!) 94 %      MAP       --         Physical Exam    Vitals reviewed.  Constitutional: She appears well-developed and well-nourished. She is not diaphoretic. No distress.   HENT:   Posterior oropharyngeal erythema without tonsillar exudates. Uvula is midline    Eyes: EOM are normal.   Cardiovascular:  Normal rate and normal heart sounds.           Pulmonary/Chest: Breath sounds normal. No respiratory distress. She has no wheezes.   Abdominal: Abdomen is soft. She exhibits no distension. There is no abdominal tenderness.           ED Course   Procedures  Labs Reviewed   INFLUENZA A & B BY MOLECULAR       Result Value    Influenza A, Molecular Negative      Influenza B, Molecular Negative      Flu A & B Source Nasal swab     SARS-COV-2 RNA AMPLIFICATION, QUAL    SARS-CoV-2 RNA, Amplification, Qual Negative            Imaging Results    None          Medications - No data to display  Medical Decision Making  Differential Diagnosis includes, but is not limited to:  Sepsis, meningitis, cavernous sinus thrombosis, nasal foreign body, otitis media/external, nasal polyp, bacterial sinusitis, allergic rhinitis,  influenza, bacterial/viral pharyngitis, peritonsillar abscess, retropharyngeal abscess, bacterial/viral pneumonia.    Differential Diagnosis includes, but is not limited to:  ACS/MI, PE, aortic dissection, pneumothorax, cardiac tamponade, pericarditis/myocarditis, pneumonia, infection/abscess, lung mass, trauma/fracture, costochondritis/pleurisy, MSK pain/contusion, GERD, biliary disease, pancreatitis, anemia    ED management     64-year-old female with past medical history of COPD, arthritis, anxiety, hypertension presents to the ED for further evaluation of general illness x 2 days.  Patient is not toxic appearing, hemodynamically stable and resting comfortably on bed. Patient is well-appearing.  Awake and alert.  Afebrile with vitals WNL. No distress on exam. Negative COVID, influenza.After complete evaluation, including thorough history and physical exam, the patient's symptoms are most likely due to viral upper respiratory infection. There are no concerning features on physical exam to suggest bacterial otitis media/externa, sinusitis, pharyngitis, or peritonsillar abscess. Vital signs do not suggest sepsis. Lung sounds are clear and not consistent with pneumonia. There is no neck pain or limited ROM to suggest retropharyngeal abscess or meningitis. Advised supportive care for symptoms of viral URI. Pt reported new onset of chest pain. Heart score of 3. Moderate risk for MACE.  EKG unremarkable, with a evidence of ischemia.  Notable for normal sinus rhythm 86 beats per minute, normal axis, normal intervals, ST changes. I advised patient to obtain further workup but she deferred. States she wants to go home and she think it was anxiety related. Pt stable prior to discharge.    I have discussed the specifics of the workup with the patient and the patient has verbalized understanding of the details of the workup, the diagnosis, the treatment plan, and the need for outpatient follow-up with PCP. ED precautions  given. Discussed with pt about returning to the ED, if symptoms fail to improve or worsen.     RESULTS:  Documented in ED course.   Labs/ekg interpreted by myself     Voice recognition software utilized in this note. Typographical and content errors may occur with this process. While efforts are made to detect and correct such errors, in some cases errors will persist. For this reason, wording in this document should be considered in the proper context and not strictly verbatim.       Amount and/or Complexity of Data Reviewed  Labs: ordered. Decision-making details documented in ED Course.  Radiology: ordered.               ED Course as of 12/23/24 1052   Sun Dec 22, 2024   1251 BP(!): 142/94 [NW]   1251 Temp: 98.3 °F (36.8 °C) [NW]   1251 Resp: 18 [NW]   1251 Pulse: 90 [NW]   1347 Influenza A & B by Molecular      Negative [NW]   1347 SARS-CoV-2 RNA, Amplification, Qual: Negative  Negative [NW]   1413 Pt presenting with viral URI symptoms. Notes new onset of chest pain and SOB. I offered additional work up. Pt deferred. She request to leave AMA.    The patient displays normal decision making capacity. I explained to the patient the nature of his/her illness, injury, or disease, and the need and advisability of continued in-hospital evaluation / treatment. I explained that the risks of discontinuing medical care at this time include worsening of condition, bleeding, infection, permanent disability, MI, coma, and death.   Furthermore, I offered the patient alternatives to the preferred evaluation and treatment plan, specifically medication, observation, blood work. The risks and benefits of these alternatives were discussed.   All the patient's questions were answered.  Nonetheless, the patient persists in refusing continued care/treatment at this time and reiterates a desire and intention to leave the ER against medical advice   [NW]      ED Course User Index  [NW] Michelle Cross PA-C                            Clinical Impression:  Final diagnoses:  [R07.9] Chest pain  [J06.9] Viral URI (Primary)          ED Disposition Condition    AMA Stable                Michelle Cross PA-C  12/23/24 9962

## 2024-12-22 NOTE — ED NOTES
"RN in room to begin cardiac workup. Patient disgruntled reporting "I came only to see if I had the flu. I don't,  now I'm ready to go home." ED provider notified patient wishing to leave AMA. ED provider to bedside.   "

## 2024-12-27 LAB
OHS QRS DURATION: 74 MS
OHS QTC CALCULATION: 450 MS

## 2025-01-06 ENCOUNTER — OFFICE VISIT (OUTPATIENT)
Dept: GASTROENTEROLOGY | Facility: CLINIC | Age: 65
End: 2025-01-06
Payer: MEDICARE

## 2025-01-06 VITALS — BODY MASS INDEX: 28.47 KG/M2 | WEIGHT: 155.63 LBS

## 2025-01-06 DIAGNOSIS — K21.9 GASTROESOPHAGEAL REFLUX DISEASE, UNSPECIFIED WHETHER ESOPHAGITIS PRESENT: Primary | ICD-10-CM

## 2025-01-06 DIAGNOSIS — R14.3 FLATULENCE/WIND: ICD-10-CM

## 2025-01-06 PROCEDURE — 3008F BODY MASS INDEX DOCD: CPT | Mod: CPTII,S$GLB,, | Performed by: NURSE PRACTITIONER

## 2025-01-06 PROCEDURE — 99999 PR PBB SHADOW E&M-EST. PATIENT-LVL IV: CPT | Mod: PBBFAC,,, | Performed by: NURSE PRACTITIONER

## 2025-01-06 PROCEDURE — 1159F MED LIST DOCD IN RCRD: CPT | Mod: CPTII,S$GLB,, | Performed by: NURSE PRACTITIONER

## 2025-01-06 PROCEDURE — 1101F PT FALLS ASSESS-DOCD LE1/YR: CPT | Mod: CPTII,S$GLB,, | Performed by: NURSE PRACTITIONER

## 2025-01-06 PROCEDURE — 1126F AMNT PAIN NOTED NONE PRSNT: CPT | Mod: CPTII,S$GLB,, | Performed by: NURSE PRACTITIONER

## 2025-01-06 PROCEDURE — 99204 OFFICE O/P NEW MOD 45 MIN: CPT | Mod: S$GLB,,, | Performed by: NURSE PRACTITIONER

## 2025-01-06 PROCEDURE — 1160F RVW MEDS BY RX/DR IN RCRD: CPT | Mod: CPTII,S$GLB,, | Performed by: NURSE PRACTITIONER

## 2025-01-06 PROCEDURE — 3288F FALL RISK ASSESSMENT DOCD: CPT | Mod: CPTII,S$GLB,, | Performed by: NURSE PRACTITIONER

## 2025-01-06 NOTE — PROGRESS NOTES
GASTROENTEROLOGY CLINIC NOTE    Chief Complaint: The primary encounter diagnosis was Gastroesophageal reflux disease, unspecified whether esophagitis present. A diagnosis of Flatulence/wind was also pertinent to this visit.  Referring provider/PCP: Cristine Austin FNP HPI  Wen Mares is a 64 y.o. female who is a new patient to me who presents to clinic for dyspepsia and flatulence.   Symptoms initially began in 10/2024. Sought care at urgent care after having black stool; taking Pepto Bismol at that time as well as Gas X.   Urgent care recommended starting Beano and following up with PCP. Beano made gas and bloating worse. Also with indigestion and heartburn. Additionally, she had stopped taking Probiotic and noted some constipation. Probiotic helps regularity.   Worsening symptoms prompted her to seek care at ED; CT obtained which was unrevealing for any acute concerns.   Simethicone and bentyl initiated. Currently taking Probiotic, simethicone, and omeprazole 40mg daily.   Bowel movements have become regular with restarting probiotic. Gas and bloating have improved.   She has no complaints at this time.     Our records indicate she is due for colonoscopy; reports having colonoscopy recently with [facility]. Will request records    Reflux: Controlled with omeprazole 40mg daily  Dysphagia/Odynophagia: No  Nausea/Vomiting: No  Appetite Changes: No  Abdominal Pain: No  Bowel Habits: regular bowel movements. No change in bowel habits.  GI Bleeding: Denies hematochezia, hematemesis, melena, BRBPR, Black/tarry stool, coffee ground emesis  Unexplained Weight Loss: No       GLP-1s: No  NSAIDs: No  Anticoagulation or Antiplatelet: Plavix      History of H.pylori: yes 2019; eradication confirmed 2020  H.pylori Treatment:  Prior Upper Endoscopy: 3/2020   Impression:           - Normal esophagus.                         - Z-line regular, 37 cm from the incisors.                         - Normal stomach.  Biopsied.                         - Normal examined duodenum.     Prior Colonoscopy: 12/2018  2mm Tubular Adenoma in Cecum  Medium Internal Hemorrhoids  Family h/o Colon Cancer: No  Family h/o Crohn's Disease or Ulcerative Colitis: No  Family h/o Celiac Sprue: No  Abdominal Surgeries: no      Review of Systems   Constitutional:  Negative for weight loss.   HENT:  Negative for sore throat.    Eyes:  Negative for blurred vision.   Respiratory:  Negative for cough.    Cardiovascular:  Negative for chest pain.   Gastrointestinal:  Negative for abdominal pain, blood in stool, constipation, diarrhea, heartburn, melena, nausea and vomiting.   Genitourinary:  Negative for dysuria.   Musculoskeletal:  Negative for myalgias.   Skin:  Negative for rash.   Neurological:  Negative for headaches.   Endo/Heme/Allergies:  Negative for environmental allergies.   Psychiatric/Behavioral:  Negative for suicidal ideas. The patient is not nervous/anxious.        Past Medical History: has a past medical history of Anxiety, Arthritis, COPD (chronic obstructive pulmonary disease), Depression, GERD (gastroesophageal reflux disease), and Hypertension.    Past Surgical History: has a past surgical history that includes Colonoscopy (N/A, 12/6/2018) and Esophagogastroduodenoscopy (N/A, 3/10/2020).    Family History:family history includes Cancer in her brother and mother; Diabetes in her maternal uncle.    Allergies: Review of patient's allergies indicates:  No Known Allergies    Social History: reports that she quit smoking about 29 years ago. Her smoking use included cigarettes. She has never used smokeless tobacco. She reports current alcohol use. She reports that she does not use drugs.    Home medications:   Current Outpatient Medications on File Prior to Visit   Medication Sig Dispense Refill    acyclovir (ZOVIRAX) 400 MG tablet Take by mouth 2 (two) times daily.      albuterol (PROVENTIL/VENTOLIN HFA) 90 mcg/actuation inhaler Inhale 2  puffs into the lungs.      aspirin (ECOTRIN) 81 MG EC tablet Take 81 mg by mouth.      clopidogreL (PLAVIX) 75 mg tablet Take 1 tablet by mouth once daily.      colchicine (COLCRYS) 0.6 mg tablet Take 1 tablet (0.6 mg total) by mouth once daily. 30 tablet 11    cyproheptadine (PERIACTIN) 4 mg tablet Take 4 mg by mouth.      diclofenac sodium (VOLTAREN) 1 % Gel Apply 2 g topically 4 (four) times daily. For breast pain 1 Tube 1    doxepin (SILENOR) 3 mg Tab Take 3 mg by mouth every evening. 90 tablet 0    DULoxetine (CYMBALTA) 30 MG capsule Take 30 mg by mouth once daily.      ferrous sulfate 325 (65 FE) MG EC tablet Take 325 mg by mouth.      hydrOXYzine HCL (ATARAX) 25 MG tablet Take 1 tablet (25 mg total) by mouth every 6 to 8 hours as needed for Itching. 20 tablet 0    omeprazole (PRILOSEC) 40 MG capsule Take 40 mg by mouth.      oxybutynin (DITROPAN-XL) 10 MG 24 hr tablet Take 10 mg by mouth once daily.  11    potassium chloride SA (K-DUR,KLOR-CON) 20 MEQ tablet TAKE 1 TABLET (20 MEQ) BY ORAL ROUTE ONCE DAILY WITH FOOD      PROAIR HFA 90 mcg/actuation inhaler Inhale 2 puffs into the lungs every 4 (four) hours as needed.  3    simethicone 42 mg Chew Take 1 Dose by mouth 2 (two) times daily as needed. 60 tablet 0    sucralfate (CARAFATE) 1 gram tablet Take 1 g by mouth.      VITAMIN D3 1,000 unit capsule Take by mouth once daily.  2    [DISCONTINUED] pregabalin (LYRICA) 75 MG capsule Take 1 capsule (75 mg total) by mouth 2 (two) times daily. (Patient not taking: Reported on 10/7/2024) 60 capsule 1     Current Facility-Administered Medications on File Prior to Visit   Medication Dose Route Frequency Provider Last Rate Last Admin    triamcinolone acetonide injection 40 mg  40 mg INTRABURSAL 1 time in Clinic/HOD Walter Mendoza, DO           Vital signs:  Wt 70.6 kg (155 lb 10.3 oz)   BMI 28.47 kg/m²     Physical Exam  Vitals reviewed.   Constitutional:       General: She is not in acute distress.     Appearance:  "Normal appearance. She is not ill-appearing.   HENT:      Head: Normocephalic.   Cardiovascular:      Rate and Rhythm: Normal rate and regular rhythm.      Heart sounds: Normal heart sounds. No murmur heard.  Pulmonary:      Effort: Pulmonary effort is normal. No respiratory distress.      Breath sounds: Normal breath sounds.   Chest:      Chest wall: No tenderness.   Abdominal:      General: Bowel sounds are normal. There is no distension.      Palpations: Abdomen is soft.      Tenderness: There is no abdominal tenderness. Negative signs include Mccall's sign.      Hernia: No hernia is present.   Skin:     General: Skin is warm.   Neurological:      Mental Status: She is alert and oriented to person, place, and time.   Psychiatric:         Mood and Affect: Mood normal.         Behavior: Behavior normal.         Thought Content: Thought content normal.         Routine labs:  Lab Results   Component Value Date    WBC 6.86 11/26/2024    HGB 13.9 11/26/2024    HCT 40.2 11/26/2024    MCV 85 11/26/2024     11/26/2024     Lab Results   Component Value Date    INR 1.1 07/31/2023     No results found for: "IRON", "FERRITIN", "TIBC", "FESATURATED"  Lab Results   Component Value Date     11/26/2024    K 3.9 11/26/2024     11/26/2024    CO2 20 (L) 11/26/2024    BUN 14 11/26/2024    CREATININE 0.8 11/26/2024     Lab Results   Component Value Date    ALBUMIN 4.0 11/26/2024    ALT 17 11/26/2024    AST 17 11/26/2024    ALKPHOS 106 11/26/2024    BILITOT 0.5 11/26/2024     No results found for: "GLUCOSE"  No results found for: "TSH"  Lab Results   Component Value Date    CALCIUM 9.0 11/26/2024       Imaging:  CT Abdomen Pelvis  Without Contrast  Narrative: EXAMINATION:  CT ABDOMEN PELVIS WITHOUT CONTRAST    CLINICAL HISTORY:  Bowel obstruction suspected;    TECHNIQUE:  Low dose axial images, sagittal and coronal reformations were obtained from the lung bases to the pubic symphysis.  No contrast media " utilized.    COMPARISON:  CT abdomen and pelvis 10/03/2022, right upper quadrant ultrasound 12/18/2019; report only from outside facility pelvic ultrasound 07/13/2012    FINDINGS:  Lack of IV contrast limits evaluation of soft tissue and vascular structures.    Imaged lung bases show scattered areas of platelike scarring versus atelectasis without consolidation or pleural effusion.  Base of the heart is within normal limits.    Noncontrast appearance of the liver, gallbladder, pancreas, spleen, stomach, duodenum and right adrenal gland are within normal limits.  2 cm low-density left adrenal nodule not significantly changed from prior and previously favored to represent benign adenoma on prior imaging.  No significant dilatation.    Bilateral kidneys are normal in overall size, shape and location.  2 mm nephrolith at the right renal upper pole.  No radiopaque calculus seen within the right ureter, left collecting system or urinary bladder.  1.3 cm simple appearing parenchymal cyst at the medial left renal interpolar region similar to prior.  No hydronephrosis or significant perinephric stranding.  Ureters are normal in course and caliber.  Urinary bladder is well distended without wall thickening or adjacent inflammatory change.    Noncontrast appearance of the uterus and bilateral adnexa are within normal limits.  No significant volume free pelvic fluid.  Few scattered punctate pelvic phleboliths noted.    Appendix and terminal ileum are within normal limits.  No evidence of bowel obstruction or acute bowel inflammation.  No bowel pneumatosis or portal venous gas.    No ascites, free air or lymphadenopathy by CT criteria.  Minimal to mild scattered calcific atherosclerosis of the abdominal aorta extending into its iliac branches.  Aorta tapers normally.    Extraperitoneal soft tissues and osseous structures appear grossly stable without acute findings.  Impression: 1. No acute process seen on this noncontrast  study.  Specifically, no evidence of bowel obstruction or acute bowel inflammation.  2. Right renal 2 mm nonobstructing nephrolith, not definitively seen on previous imaging.  3. Grossly stable 2 cm left adrenal nodule with previous imaging characteristics consistent with benign adenoma.  4. Right renal simple appearing cyst, unchanged.  5. Atherosclerosis.  6. Other incidental/nonemergent findings in the body of the report.    Electronically signed by: Bernabe Selby MD  Date:    11/26/2024  Time:    14:56      I have reviewed prior labs, imaging, and notes.      Assessment:  1. Gastroesophageal reflux disease, unspecified whether esophagitis present    2. Flatulence/wind      Reflux currently controlled with omeprazole 40mg daily.   Flatulence has improved. Restarted probiotic and now having daily bowel movements.     Plan:     Continue omeprazole as previously prescribed.   Continue probiotic and simethicone   Request records of colonoscopy from [facility]  Handout provided on diet modifications for gas and bloating.     Plan of care discussed with patient who is in agreement and verbalized understanding.     I have explained the planned procedures to the patient.The risks, benefits and alternatives of the procedure were also explained in detail. Patient verbalized understanding, all questions were answered. The patient agrees to proceed as planned    Follow Up: YOEL Leon, APRN,FNP-BC  [facility]/[place]    (Portions of this note were dictated using voice recognition software and may contain dictation related errors in spelling/grammar/syntax not found on text review)

## 2025-01-06 NOTE — PATIENT INSTRUCTIONS
Continue gas medicine, probiotics, and omeprazole     We will request records of colonoscopy from [facility].       Bloating, Belching, Flatulence  Trial Avoiding ALL of the Following for the next 2 to 4 weeks  No Artificial Sweeteners  No Dairy (milk, butter, margarine, yogurt, ice cream)  No Carbonated Beverages  No Drinking From Straws  No Chewing Gum  If your symptoms resolve completely with your avoidance of the above items, then you can add back the above items ONE AT A TIME  Add back ONLY One At A Time (each item for a 7 day period) to find out which one was responsible for your symptoms.     Tips on Belching, Bloating, and Flatulence  Belching is caused by swallowing air accumulating in the stomach. This can be from:  Eating or drinking too fast  Poorly fitting dentures  Not chewing food completely  Carbonated Beverages  Chewing on gum or sucking on hard candy  Excessive swallowing due to nervous tension or postnasal drip    To Prevent Excessive Belching Avoid:  Carbonated Beverages  Chewing Gum  Hard Candies  Simethicone may be helpful    Bloating is a sense of fullness in the upper abdomen. It can be influenced by gas and/or food accumulation in the stomach. Abdominal bloating and discomfort may be due to intestinal sensitivity or IBS. To relieve symptoms of bloating/discomfort avoid:  Broccoli  Baked Beans  Cabbage  Carbonated Drinks  Cauliflower  Chewing Gum  Hard Candy    Flatulence is gas created through bacterial action in the bowel and passes rectally. Gas that accumulates in the right upper portion of the colon can lead to pain which could seem like gallbladder pain. Gas that accumulates in the left upper portion of the colon can radiate up to the chest and seem like cardiac pain. Keep in mind that:  10-18 passages per day are normal  Primary gases are harmless and odorless  Noticeable smells are trace gases related to food intake    Foods that are likely to cause gas include:  Milk, dairy products,  and medications that contain lactose - if your body doesn't produce the enzyme (lactase) to break it down  Certain vegetables - baked beans, cauliflower, broccoli, cabbage  Certain starches - wheat, oats, corn, potatoes. Rice is a good substitute  If you are having excessive gas, you may benefit from keeping a flatus diary for three days. Note the time of gas passage and food for each meal.

## 2025-01-08 ENCOUNTER — TELEPHONE (OUTPATIENT)
Dept: GASTROENTEROLOGY | Facility: CLINIC | Age: 65
End: 2025-01-08
Payer: MEDICARE

## 2025-01-08 NOTE — TELEPHONE ENCOUNTER
Spoke with patient and she states that she had her previous procedure with Dr. Patel at [facility]. Informed patient that Np will be made aware. V/U      ----- Message from Payton sent at 1/8/2025  4:42 PM CST -----  Type:  Call Back     Who Called:pt     Does the patient know what this is regarding?:Past colonoscopy location and date   Would the patient rather a call back or a response via [facility]? Call   Best Call Back Number: [phone]  Additional Information:

## 2025-01-15 ENCOUNTER — DOCUMENTATION ONLY (OUTPATIENT)
Dept: GASTROENTEROLOGY | Facility: CLINIC | Age: 65
End: 2025-01-15
Payer: MEDICARE

## 2025-01-15 NOTE — PROGRESS NOTES
Received records from [facility] and summarized as follows:    Colonoscopy 2/2023  Pathology: tubular adenoma    Repeat colonoscopy recommended 2028  No procedure report available.    EGD 1/2016  Pathology: mild to moderate chronic gastritis in antrum  Negative H.pylori    No procedure report available.

## 2025-02-12 ENCOUNTER — HOSPITAL ENCOUNTER (EMERGENCY)
Facility: HOSPITAL | Age: 65
Discharge: HOME | End: 2025-02-12
Attending: EMERGENCY MEDICINE
Payer: MEDICARE

## 2025-02-12 VITALS
HEIGHT: 62 IN | TEMPERATURE: 98 F | DIASTOLIC BLOOD PRESSURE: 84 MMHG | RESPIRATION RATE: 20 BRPM | WEIGHT: 140 LBS | OXYGEN SATURATION: 97 % | HEART RATE: 89 BPM | BODY MASS INDEX: 25.76 KG/M2 | SYSTOLIC BLOOD PRESSURE: 126 MMHG

## 2025-02-12 DIAGNOSIS — S52.614A NONDISPLACED FRACTURE OF RIGHT ULNA STYLOID PROCESS, INITIAL ENCOUNTER FOR CLOSED FRACTURE: ICD-10-CM

## 2025-02-12 DIAGNOSIS — S52.501A CLOSED FRACTURE OF DISTAL END OF RIGHT RADIUS, UNSPECIFIED FRACTURE MORPHOLOGY, INITIAL ENCOUNTER: Primary | ICD-10-CM
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
## 2025-02-12 PROCEDURE — 25000003 PHARM REV CODE 250: Performed by: PHYSICIAN ASSISTANT

## 2025-02-12 PROCEDURE — 29105 APPLICATION LONG ARM SPLINT: CPT | Mod: RT

## 2025-02-12 PROCEDURE — 99283 EMERGENCY DEPT VISIT LOW MDM: CPT | Mod: 25

## 2025-02-12 RX ORDER — OXYCODONE HYDROCHLORIDE 5 MG/1
5 TABLET ORAL
Status: COMPLETED | OUTPATIENT
Start: 2025-02-12 | End: 2025-02-12

## 2025-02-12 RX ORDER — OXYCODONE AND ACETAMINOPHEN 5; 325 MG/1; MG/1
1 TABLET ORAL EVERY 8 HOURS PRN
Qty: 9 TABLET | Refills: 0 | Status: SHIPPED | OUTPATIENT
Start: 2025-02-12 | End: 2025-02-15

## 2025-02-12 RX ADMIN — OXYCODONE HYDROCHLORIDE 5 MG: 5 TABLET ORAL at 10:02

## 2025-02-12 NOTE — ED PROVIDER NOTES
Encounter Date: 2025       History     Chief Complaint   Patient presents with    Fall     Pt reports tripped and fell onto R wrist last night at 2200. Pt c/o R wrist pain w/ deformity. Pt has R wrist in brace, applied after incident. Denies hitting head or LOC. Took 800mg ibuprofen w/o relief.      64-year-old female presents to ED with concern of right wrist pain after trip and fall that occurred yesterday evening around 2200.  Patient reports she tripped over a shoe box, landing onto right outstretched arm.  Denying any other associated injuries from fall, specifically with no head injury or LOC.  No neck pain.  No numbness or focal weakness.  She did apply home brace yesterday evening and took ibuprofen but continues to have severe pain.  No other acute complaints at this time    The history is provided by the patient.     Review of patient's allergies indicates:  No Known Allergies  Past Medical History:   Diagnosis Date    Anxiety     Arthritis     COPD (chronic obstructive pulmonary disease)     Depression     GERD (gastroesophageal reflux disease)     Hypertension      Past Surgical History:   Procedure Laterality Date    COLONOSCOPY N/A 2018    Procedure: COLONOSCOPY;  Surgeon: Endy Moncada MD;  Location: [facility] ([address] [place] [address]);  Service: Endoscopy;  Laterality: N/A;  PM Prep - sm    ESOPHAGOGASTRODUODENOSCOPY N/A 3/10/2020    Procedure: EGD (ESOPHAGOGASTRODUODENOSCOPY);  Surgeon: Derrell Kovacs MD;  Location: [address]);  Service: Endoscopy;  Laterality: N/A;     Family History   Problem Relation Name Age of Onset    Cancer Mother      Cancer Brother      Diabetes Maternal Uncle      Colon cancer Neg Hx      Stomach cancer Neg Hx      Inflammatory bowel disease Neg Hx       Social History     Tobacco Use    Smoking status: Former     Current packs/day: 0.00     Types: Cigarettes     Quit date: 1995     Years since quittin.1    Smokeless tobacco: Never   Substance Use  Topics    Alcohol use: Yes     Comment: weekends    Drug use: No     Review of Systems   Musculoskeletal:  Positive for arthralgias.   Neurological:  Negative for weakness and numbness.       Physical Exam     Initial Vitals [02/12/25 0852]   BP Pulse Resp Temp SpO2   126/84 89 (!) 24 98.4 °F (36.9 °C) 97 %      MAP       --         Physical Exam    Vitals reviewed.  Constitutional: She appears well-developed and well-nourished. She is active. She does not have a sickly appearance. She does not appear ill. No distress.   HENT:   Head: Normocephalic and atraumatic.   Neck:   Normal range of motion.  Pulmonary/Chest: Effort normal.   Musculoskeletal:      Cervical back: Normal range of motion.      Comments: Right wrist deformity with localized tenderness and swelling predominantly over distal ulna.  Localized swelling with mild bruising.  Limited ROM.  Distal sensation intact.  Brisk capillary refill with intact radial pulse.  ROM of fingers intact     Neurological: She is alert. GCS eye subscore is 4. GCS verbal subscore is 5. GCS motor subscore is 6.   Skin: Skin is warm and dry.   Psychiatric: She has a normal mood and affect. Her speech is normal and behavior is normal.         ED Course   Splint Application    Date/Time: 2/12/2025 11:28 AM    Performed by: Guido Juarez PA-C  Authorized by: Akash Vang MD  Location details: right arm  Splint type: sugar tong  Supplies used: Ortho-Glass  Post-procedure: The splinted body part was neurovascularly unchanged following the procedure.  Patient tolerance: Patient tolerated the procedure well with no immediate complications        Labs Reviewed - No data to display       Imaging Results              X-Ray Wrist Complete Right (Final result)  Result time 02/12/25 10:18:38      Final result by Nato Abraham MD (02/12/25 10:18:38)                   Impression:      Mildly displaced acute fractures of the distal radius and ulnar  styloid.      Electronically signed by: Nato Abraham MD  Date:    02/12/2025  Time:    10:18               Narrative:    EXAMINATION:  XR HAND COMPLETE 3 VIEW RIGHT; XR WRIST COMPLETE 3 VIEWS RIGHT    CLINICAL HISTORY:  fall; Unspecified fall, initial encounter.    TECHNIQUE:  Three views of the right wrist and three views of the right hand.    COMPARISON:  None.    FINDINGS:  Right wrist: Mildly displaced and comminuted acute fracture of the distal radius with questionable extension into the radiocarpal joint.  Mildly displaced fracture of the ulnar styloid.  No significant angulation of the distal radius fracture.  No additional acute displaced fracture.  Moderate degenerative changes in the hand and wrist.  Wrist soft tissue edema.  No unexpected radiopaque foreign body.    Right hand: No acute displaced fracture or dislocation allowing for suboptimal positioning, other than described above.  Degenerative changes in the hand including the metacarpophalangeal joints and interphalangeal joints.  Soft tissue edema along the dorsal aspect of the hand.  No unexpected radiopaque foreign body.                                       X-Ray Hand 3 view Right (Final result)  Result time 02/12/25 10:18:38      Final result by Nato Abraham MD (02/12/25 10:18:38)                   Impression:      Mildly displaced acute fractures of the distal radius and ulnar styloid.      Electronically signed by: Nato Abraham MD  Date:    02/12/2025  Time:    10:18               Narrative:    EXAMINATION:  XR HAND COMPLETE 3 VIEW RIGHT; XR WRIST COMPLETE 3 VIEWS RIGHT    CLINICAL HISTORY:  fall; Unspecified fall, initial encounter.    TECHNIQUE:  Three views of the right wrist and three views of the right hand.    COMPARISON:  None.    FINDINGS:  Right wrist: Mildly displaced and comminuted acute fracture of the distal radius with questionable extension into the radiocarpal joint.  Mildly displaced fracture of the ulnar styloid.   No significant angulation of the distal radius fracture.  No additional acute displaced fracture.  Moderate degenerative changes in the hand and wrist.  Wrist soft tissue edema.  No unexpected radiopaque foreign body.    Right hand: No acute displaced fracture or dislocation allowing for suboptimal positioning, other than described above.  Degenerative changes in the hand including the metacarpophalangeal joints and interphalangeal joints.  Soft tissue edema along the dorsal aspect of the hand.  No unexpected radiopaque foreign body.                                       Medications   oxyCODONE immediate release tablet 5 mg (5 mg Oral Given 2/12/25 1019)     Medical Decision Making  Patient presents with concern of right wrist pain and deformity after trip and fall that occurred yesterday evening.  Afebrile.  Patient in no distress on exam    DDx:  Including but not limited to fall, contusing, strain, sprain, dislocation, fracture    Amount and/or Complexity of Data Reviewed  Radiology: ordered. Decision-making details documented in ED Course.    Risk  Prescription drug management.               ED Course as of 02/12/25 1137   Wed Feb 12, 2025   1019 X-Ray Wrist Complete Right  X-ray right wrist per my interpretation does show minimally displaced distal radius fracture, along with what appears to be ulnar styloid fracture.  Pending Radiology review [KS]   1019 X-Ray Hand 3 view Right  X-ray right hand per my interpretation noting distal radius and ulnar styloid fractures but otherwise no acute fracture visualized.  Pending Radiology review [KS]   1129 Images reviewed by Radiology.  Results discussed with patient [KS]   1129 Sugar-tong splint along with sling were applied in ED.  Patient tolerated well.  Short prescription written for pain medication for pain control.  Ambulatory referral sent to Orthopedics.  Encouraged close outpatient follow-up and high fall precautions.  ED return precautions were discussed.   Patient states understanding and agrees with plan [KS]      ED Course User Index  [KS] Guido Juarez PA-C                             Clinical Impression:  Final diagnoses:  [W19.XXXA] Fall  [S52.501A] Closed fracture of distal end of right radius, unspecified fracture morphology, initial encounter (Primary)  [S52.096F] Nondisplaced fracture of right ulna styloid process, initial encounter for closed fracture          ED Disposition Condition    Discharge Stable          ED Prescriptions       Medication Sig Dispense Start Date End Date Auth. Provider    oxyCODONE-acetaminophen (PERCOCET) 5-325 mg per tablet Take 1 tablet by mouth every 8 (eight) hours as needed for Pain. 9 tablet 2/12/2025 2/15/2025 Guido Juarez PA-C          Follow-up Information       Follow up With Specialties Details Why Contact Info    Cristine Austin, [facility]   [address] [facility] [address]  [phone]               Guido Juarez PA-C  02/12/25 1135

## 2025-02-12 NOTE — ED NOTES
"Wen Mares, a 64 y.o. female presents to the ED w/ complaint of "tripped and fell last night around 10 pm on a shoe box and fell on my right arm. I took an 800mg ibuprofen last night, put a bag of ice on it, and I fell asleep. If I had help getting dressed last night, I would have came last night." Right hand is in a brace on arrival, swelling noted to right hand and fingers. Is able to wiggle fingers and capillary refill less than 3 seconds. Rates pain as 10.   Given patient pillow, 2 warm blankets and daughter 2 warm blankets. Expressed thanks and notfied patient provider to evaluate shortly. Denies any other concerning symptoms.    Triage note:  Chief Complaint   Patient presents with    Fall     Pt reports tripped and fell onto R wrist last night at 2200. Pt c/o R wrist pain w/ deformity. Pt has R wrist in brace, applied after incident. Denies hitting head or LOC. Took 800mg ibuprofen w/o relief.      Review of patient's allergies indicates:  No Known Allergies  Past Medical History:   Diagnosis Date    Anxiety     Arthritis     COPD (chronic obstructive pulmonary disease)     Depression     GERD (gastroesophageal reflux disease)     Hypertension        "

## 2025-02-12 NOTE — ED NOTES
Reviewed discharge instructions along with location to  prescription and patient expressed understanding. Reiterated that orthopedic referral made by SANDRA and will call patient with appointment time and location. Patient wheeled out as requested and daughter stated thanks as well.

## 2025-02-18 ENCOUNTER — OFFICE VISIT (OUTPATIENT)
Dept: ORTHOPEDICS | Facility: CLINIC | Age: 65
End: 2025-02-18
Payer: MEDICARE

## 2025-02-18 ENCOUNTER — HOSPITAL ENCOUNTER (OUTPATIENT)
Dept: RADIOLOGY | Facility: HOSPITAL | Age: 65
Discharge: HOME | End: 2025-02-18
Attending: ORTHOPAEDIC SURGERY
Payer: MEDICARE

## 2025-02-18 VITALS — BODY MASS INDEX: 25.76 KG/M2 | WEIGHT: 140 LBS | HEIGHT: 62 IN

## 2025-02-18 DIAGNOSIS — S52.501A CLOSED FRACTURE OF DISTAL END OF RIGHT RADIUS, UNSPECIFIED FRACTURE MORPHOLOGY, INITIAL ENCOUNTER: ICD-10-CM

## 2025-02-18 DIAGNOSIS — S63.094A: ICD-10-CM

## 2025-02-18 DIAGNOSIS — S52.601A FRACTURE OF DISTAL RADIUS AND ULNA, RIGHT, CLOSED, INITIAL ENCOUNTER: Primary | ICD-10-CM

## 2025-02-18 DIAGNOSIS — M25.531 RIGHT WRIST PAIN: ICD-10-CM

## 2025-02-18 DIAGNOSIS — S52.501A FRACTURE OF DISTAL RADIUS AND ULNA, RIGHT, CLOSED, INITIAL ENCOUNTER: Primary | ICD-10-CM

## 2025-02-18 DIAGNOSIS — M25.531 RIGHT WRIST PAIN: Primary | ICD-10-CM

## 2025-02-18 PROCEDURE — 73110 X-RAY EXAM OF WRIST: CPT | Mod: TC,PN,RT

## 2025-02-18 RX ORDER — HYDROCODONE BITARTRATE AND ACETAMINOPHEN 5; 325 MG/1; MG/1
1 TABLET ORAL EVERY 6 HOURS PRN
Qty: 28 TABLET | Refills: 0 | Status: SHIPPED | OUTPATIENT
Start: 2025-02-18

## 2025-02-18 NOTE — PROGRESS NOTES
Patient ID:   Wen Mares is a 64 y.o. female.    Chief Complaint:   Right wrist fracture    HPI:   The patient is a 64-year-old right-hand dominant female who sustained a ground level fall on 2025.  She presented to the emergency department the following day.  X-rays revealed that she had a fracture of her right wrist.  She was placed into a sugar-tong splint.  She reports that she can not move her fingers because of the splint.  She reports swelling in her digits.  She denies numbness or paresthesias in her hand.    Medications:  Current Medications[1]    Allergies:  Review of patient's allergies indicates:  No Known Allergies    Past Medical History:  Past Medical History:   Diagnosis Date    Anxiety     Arthritis     COPD (chronic obstructive pulmonary disease)     Depression     GERD (gastroesophageal reflux disease)     Hypertension         Past Surgical History:  Past Surgical History:   Procedure Laterality Date    COLONOSCOPY N/A 2018    Procedure: COLONOSCOPY;  Surgeon: Endy Moncada MD;  Location: [address]);  Service: Endoscopy;  Laterality: N/A;  PM Prep - sm    ESOPHAGOGASTRODUODENOSCOPY N/A 3/10/2020    Procedure: EGD (ESOPHAGOGASTRODUODENOSCOPY);  Surgeon: Derrell Kovacs MD;  Location: [address]);  Service: Endoscopy;  Laterality: N/A;       Social History:  Social History     Occupational History    Not on file   Tobacco Use    Smoking status: Former     Current packs/day: 0.00     Types: Cigarettes     Quit date: 1995     Years since quittin.1    Smokeless tobacco: Never   Substance and Sexual Activity    Alcohol use: Yes     Comment: weekends    Drug use: No    Sexual activity: Not on file       Family History:  Family History   Problem Relation Name Age of Onset    Cancer Mother      Cancer Brother      Diabetes Maternal Uncle      Colon cancer Neg Hx      Stomach cancer Neg Hx      Inflammatory bowel disease Neg Hx       "    ROS:  Review of Systems   Musculoskeletal:  Positive for falls, joint pain, joint swelling, myalgias and stiffness.   Neurological:  Negative for numbness and paresthesias.   All other systems reviewed and are negative.      Vitals:  Ht 5' 2" (1.575 m)   Wt 63.5 kg (139 lb 15.9 oz)   BMI 25.60 kg/m²     Physical Examination:  Comprehensive Orthopaedic Musculoskeletal Exam    General      Constitutional: appears stated age, well-developed and well-nourished    Scleral icterus: no    Labored breathing: no    Psychiatric: normal mood and affect and no acute distress    Neurological: alert and oriented x3    Skin: intact    Lymphadenopathy: none     Ortho Exam   Right wrist exam:  There is significant swelling and ecchymoses about the wrist.  There is slight deformity of the wrist.  There is tenderness to palpation along the radial and ulnar aspects of the wrist.  Range motion exam deferred.  Intact light touch in the radial, ulnar, and median distributions.  Palpable radial pulse at the level of the wrist.    Imaging:  I have independently reviewed the following imaging studies performed at [facility]:    X-Ray Wrist Complete Right  Narrative: EXAMINATION:  XR HAND COMPLETE 3 VIEW RIGHT; XR WRIST COMPLETE 3 VIEWS RIGHT    CLINICAL HISTORY:  fall; Unspecified fall, initial encounter.    TECHNIQUE:  Three views of the right wrist and three views of the right hand.    COMPARISON:  None.    FINDINGS:  Right wrist: Mildly displaced and comminuted acute fracture of the distal radius with questionable extension into the radiocarpal joint.  Mildly displaced fracture of the ulnar styloid.  No significant angulation of the distal radius fracture.  No additional acute displaced fracture.  Moderate degenerative changes in the hand and wrist.  Wrist soft tissue edema.  No unexpected radiopaque foreign body.    Right hand: No acute displaced fracture or dislocation allowing for suboptimal positioning, other than described above.  " Degenerative changes in the hand including the metacarpophalangeal joints and interphalangeal joints.  Soft tissue edema along the dorsal aspect of the hand.  No unexpected radiopaque foreign body.  Impression: Mildly displaced acute fractures of the distal radius and ulnar styloid.    Electronically signed by: Nato Abraham MD  Date:    02/12/2025  Time:    10:18  X-Ray Hand 3 view Right  Narrative: EXAMINATION:  XR HAND COMPLETE 3 VIEW RIGHT; XR WRIST COMPLETE 3 VIEWS RIGHT    CLINICAL HISTORY:  fall; Unspecified fall, initial encounter.    TECHNIQUE:  Three views of the right wrist and three views of the right hand.    COMPARISON:  None.    FINDINGS:  Right wrist: Mildly displaced and comminuted acute fracture of the distal radius with questionable extension into the radiocarpal joint.  Mildly displaced fracture of the ulnar styloid.  No significant angulation of the distal radius fracture.  No additional acute displaced fracture.  Moderate degenerative changes in the hand and wrist.  Wrist soft tissue edema.  No unexpected radiopaque foreign body.    Right hand: No acute displaced fracture or dislocation allowing for suboptimal positioning, other than described above.  Degenerative changes in the hand including the metacarpophalangeal joints and interphalangeal joints.  Soft tissue edema along the dorsal aspect of the hand.  No unexpected radiopaque foreign body.  Impression: Mildly displaced acute fractures of the distal radius and ulnar styloid.    Electronically signed by: Nato Abraham MD  Date:    02/12/2025  Time:    10:18    Assessment:  1. Fracture of distal radius and ulna, right, closed, initial encounter    2. Closed fracture of distal end of right radius, unspecified fracture morphology, initial encounter    3. Diastasis of right scapholunate joint      Plan:  Re today.peat x-ray today was performed.  She does have a mild-to-moderate amount of displacement.  There seems to be some involvement of  the lunate facet in addition there is scapholunate widening.  We will refer to hand surgery for evaluation and management.  She will go into a short-arm splint     No follow-ups on file.              [1]   Current Outpatient Medications:     acyclovir (ZOVIRAX) 400 MG tablet, Take by mouth 2 (two) times daily., Disp: , Rfl:     albuterol (PROVENTIL/VENTOLIN HFA) 90 mcg/actuation inhaler, Inhale 2 puffs into the lungs., Disp: , Rfl:     aspirin (ECOTRIN) 81 MG EC tablet, Take 81 mg by mouth., Disp: , Rfl:     clopidogreL (PLAVIX) 75 mg tablet, Take 1 tablet by mouth once daily., Disp: , Rfl:     colchicine (COLCRYS) 0.6 mg tablet, Take 1 tablet (0.6 mg total) by mouth once daily., Disp: 30 tablet, Rfl: 11    cyproheptadine (PERIACTIN) 4 mg tablet, Take 4 mg by mouth., Disp: , Rfl:     diclofenac sodium (VOLTAREN) 1 % Gel, Apply 2 g topically 4 (four) times daily. For breast pain, Disp: 1 Tube, Rfl: 1    doxepin (SILENOR) 3 mg Tab, Take 3 mg by mouth every evening., Disp: 90 tablet, Rfl: 0    DULoxetine (CYMBALTA) 30 MG capsule, Take 30 mg by mouth once daily., Disp: , Rfl:     ferrous sulfate 325 (65 FE) MG EC tablet, Take 325 mg by mouth., Disp: , Rfl:     hydrOXYzine HCL (ATARAX) 25 MG tablet, Take 1 tablet (25 mg total) by mouth every 6 to 8 hours as needed for Itching., Disp: 20 tablet, Rfl: 0    omeprazole (PRILOSEC) 40 MG capsule, Take 40 mg by mouth., Disp: , Rfl:     oxybutynin (DITROPAN-XL) 10 MG 24 hr tablet, Take 10 mg by mouth once daily., Disp: , Rfl: 11    potassium chloride SA (K-DUR,KLOR-CON) 20 MEQ tablet, TAKE 1 TABLET (20 MEQ) BY ORAL ROUTE ONCE DAILY WITH FOOD, Disp: , Rfl:     PROAIR HFA 90 mcg/actuation inhaler, Inhale 2 puffs into the lungs every 4 (four) hours as needed., Disp: , Rfl: 3    simethicone 42 mg Chew, Take 1 Dose by mouth 2 (two) times daily as needed., Disp: 60 tablet, Rfl: 0    sucralfate (CARAFATE) 1 gram tablet, Take 1 g by mouth., Disp: , Rfl:     VITAMIN D3 1,000 unit  capsule, Take by mouth once daily., Disp: , Rfl: 2    Current Facility-Administered Medications:     triamcinolone acetonide injection 40 mg, 40 mg, INTRABURSAL, 1 time in Clinic/HOD, Walter Mendoza, DO

## 2025-02-19 ENCOUNTER — OFFICE VISIT (OUTPATIENT)
Dept: ORTHOPEDICS | Facility: CLINIC | Age: 65
End: 2025-02-19
Payer: MEDICARE

## 2025-02-19 ENCOUNTER — HOSPITAL ENCOUNTER (OUTPATIENT)
Dept: RADIOLOGY | Facility: HOSPITAL | Age: 65
Discharge: HOME | End: 2025-02-19
Attending: ORTHOPAEDIC SURGERY
Payer: MEDICARE

## 2025-02-19 ENCOUNTER — TELEPHONE (OUTPATIENT)
Dept: ORTHOPEDICS | Facility: CLINIC | Age: 65
End: 2025-02-19
Payer: MEDICARE

## 2025-02-19 VITALS — BODY MASS INDEX: 25.6 KG/M2 | WEIGHT: 140 LBS

## 2025-02-19 DIAGNOSIS — S52.601A FRACTURE OF DISTAL RADIUS AND ULNA, RIGHT, CLOSED, INITIAL ENCOUNTER: ICD-10-CM

## 2025-02-19 DIAGNOSIS — S52.501D CLOSED FRACTURE OF DISTAL END OF RIGHT RADIUS WITH ROUTINE HEALING, UNSPECIFIED FRACTURE MORPHOLOGY, SUBSEQUENT ENCOUNTER: Primary | ICD-10-CM

## 2025-02-19 DIAGNOSIS — S52.501A FRACTURE OF DISTAL RADIUS AND ULNA, RIGHT, CLOSED, INITIAL ENCOUNTER: ICD-10-CM

## 2025-02-19 DIAGNOSIS — S52.501A FRACTURE OF DISTAL RADIUS AND ULNA, RIGHT, CLOSED, INITIAL ENCOUNTER: Primary | ICD-10-CM

## 2025-02-19 DIAGNOSIS — S52.601A FRACTURE OF DISTAL RADIUS AND ULNA, RIGHT, CLOSED, INITIAL ENCOUNTER: Primary | ICD-10-CM

## 2025-02-19 PROCEDURE — 1101F PT FALLS ASSESS-DOCD LE1/YR: CPT | Mod: CPTII,S$GLB,, | Performed by: ORTHOPAEDIC SURGERY

## 2025-02-19 PROCEDURE — 1160F RVW MEDS BY RX/DR IN RCRD: CPT | Mod: CPTII,S$GLB,, | Performed by: ORTHOPAEDIC SURGERY

## 2025-02-19 PROCEDURE — 1159F MED LIST DOCD IN RCRD: CPT | Mod: CPTII,S$GLB,, | Performed by: ORTHOPAEDIC SURGERY

## 2025-02-19 PROCEDURE — 99999 PR PBB SHADOW E&M-EST. PATIENT-LVL III: CPT | Mod: PBBFAC,,, | Performed by: ORTHOPAEDIC SURGERY

## 2025-02-19 PROCEDURE — 3288F FALL RISK ASSESSMENT DOCD: CPT | Mod: CPTII,S$GLB,, | Performed by: ORTHOPAEDIC SURGERY

## 2025-02-19 PROCEDURE — 1125F AMNT PAIN NOTED PAIN PRSNT: CPT | Mod: CPTII,S$GLB,, | Performed by: ORTHOPAEDIC SURGERY

## 2025-02-19 PROCEDURE — 99213 OFFICE O/P EST LOW 20 MIN: CPT | Mod: S$GLB,,, | Performed by: ORTHOPAEDIC SURGERY

## 2025-02-19 PROCEDURE — 3008F BODY MASS INDEX DOCD: CPT | Mod: CPTII,S$GLB,, | Performed by: ORTHOPAEDIC SURGERY

## 2025-02-19 NOTE — TELEPHONE ENCOUNTER
----- Message from Ulices sent at 2/19/2025  1:14 PM CST -----  Type:  Needs Medical AdviceWho Called: pt Symptoms (please be specific): tri stated she's in a lot of pain from splint Pt stated it feels like she can't move until she can be seen on 02/26How long has patient had these symptoms: since Would the patient rather a call back or a response via [facility]? Call Best Call Back Number:  [phone] Information: Tri stated she feels as if it was placed on incorrectly

## 2025-02-19 NOTE — TELEPHONE ENCOUNTER
Called and spoke with pt. Pt had C/O her splint hurting too much. Splint was put on yesterday for her wrist fracture. Pt stated she would try to come in today to get it fixed.

## 2025-02-21 ENCOUNTER — TELEPHONE (OUTPATIENT)
Dept: ORTHOPEDICS | Facility: CLINIC | Age: 65
End: 2025-02-21
Payer: MEDICARE

## 2025-02-21 DIAGNOSIS — M25.531 RIGHT WRIST PAIN: Primary | ICD-10-CM

## 2025-02-22 NOTE — PROGRESS NOTES
Patient ID:   Wen Mares is a 64 y.o. female.    Chief Complaint:   Follow-up evaluation for right distal radius and ulna fracture    HPI:   Patient called, reporting pain with her splint     Medications:  Current Medications[1]    Allergies:  Review of patient's allergies indicates:  No Known Allergies    Vitals:  Wt 63.5 kg (139 lb 15.9 oz)   BMI 25.60 kg/m²     Physical Examination:  Ortho Exam   Splint was removed and skin was examined. No skin breakdown.     Assessment:  1. Closed fracture of distal end of right radius with routine healing, unspecified fracture morphology, subsequent encounter      Plan:  Splint was changed back to a sugar tong for better fracture control. She will be seeing Dr. Stephens in the near future for evaluation and management for possible ORIF.        No follow-ups on file.               [1]   Current Outpatient Medications:     acyclovir (ZOVIRAX) 400 MG tablet, Take by mouth 2 (two) times daily., Disp: , Rfl:     albuterol (PROVENTIL/VENTOLIN HFA) 90 mcg/actuation inhaler, Inhale 2 puffs into the lungs., Disp: , Rfl:     aspirin (ECOTRIN) 81 MG EC tablet, Take 81 mg by mouth., Disp: , Rfl:     clopidogreL (PLAVIX) 75 mg tablet, Take 1 tablet by mouth once daily., Disp: , Rfl:     colchicine (COLCRYS) 0.6 mg tablet, Take 1 tablet (0.6 mg total) by mouth once daily., Disp: 30 tablet, Rfl: 11    cyproheptadine (PERIACTIN) 4 mg tablet, Take 4 mg by mouth., Disp: , Rfl:     diclofenac sodium (VOLTAREN) 1 % Gel, Apply 2 g topically 4 (four) times daily. For breast pain, Disp: 1 Tube, Rfl: 1    doxepin (SILENOR) 3 mg Tab, Take 3 mg by mouth every evening., Disp: 90 tablet, Rfl: 0    DULoxetine (CYMBALTA) 30 MG capsule, Take 30 mg by mouth once daily., Disp: , Rfl:     ferrous sulfate 325 (65 FE) MG EC tablet, Take 325 mg by mouth., Disp: , Rfl:     HYDROcodone-acetaminophen (NORCO) 5-325 mg per tablet, Take 1 tablet by mouth every 6 (six) hours as needed for Pain., Disp: 28 tablet,  Rfl: 0    hydrOXYzine HCL (ATARAX) 25 MG tablet, Take 1 tablet (25 mg total) by mouth every 6 to 8 hours as needed for Itching., Disp: 20 tablet, Rfl: 0    omeprazole (PRILOSEC) 40 MG capsule, Take 40 mg by mouth., Disp: , Rfl:     oxybutynin (DITROPAN-XL) 10 MG 24 hr tablet, Take 10 mg by mouth once daily., Disp: , Rfl: 11    potassium chloride SA (K-DUR,KLOR-CON) 20 MEQ tablet, TAKE 1 TABLET (20 MEQ) BY ORAL ROUTE ONCE DAILY WITH FOOD, Disp: , Rfl:     PROAIR HFA 90 mcg/actuation inhaler, Inhale 2 puffs into the lungs every 4 (four) hours as needed., Disp: , Rfl: 3    simethicone 42 mg Chew, Take 1 Dose by mouth 2 (two) times daily as needed., Disp: 60 tablet, Rfl: 0    sucralfate (CARAFATE) 1 gram tablet, Take 1 g by mouth., Disp: , Rfl:     VITAMIN D3 1,000 unit capsule, Take by mouth once daily., Disp: , Rfl: 2    Current Facility-Administered Medications:     triamcinolone acetonide injection 40 mg, 40 mg, INTRABURSAL, 1 time in Clinic/HOD, Walter Mendoza DO

## 2025-02-26 ENCOUNTER — OFFICE VISIT (OUTPATIENT)
Dept: ORTHOPEDICS | Facility: CLINIC | Age: 65
End: 2025-02-26
Payer: MEDICARE

## 2025-02-26 ENCOUNTER — HOSPITAL ENCOUNTER (OUTPATIENT)
Dept: RADIOLOGY | Facility: HOSPITAL | Age: 65
Discharge: HOME | End: 2025-02-26
Payer: MEDICARE

## 2025-02-26 DIAGNOSIS — M25.531 RIGHT WRIST PAIN: ICD-10-CM

## 2025-02-26 DIAGNOSIS — M25.531 RIGHT WRIST PAIN: Primary | ICD-10-CM

## 2025-02-26 DIAGNOSIS — S52.501A FRACTURE OF DISTAL RADIUS AND ULNA, RIGHT, CLOSED, INITIAL ENCOUNTER: ICD-10-CM

## 2025-02-26 DIAGNOSIS — S52.601A FRACTURE OF DISTAL RADIUS AND ULNA, RIGHT, CLOSED, INITIAL ENCOUNTER: ICD-10-CM

## 2025-02-26 PROCEDURE — 99999 PR PBB SHADOW E&M-EST. PATIENT-LVL III: CPT | Mod: PBBFAC,,,

## 2025-02-26 PROCEDURE — 73110 X-RAY EXAM OF WRIST: CPT | Mod: TC,PN,RT

## 2025-02-26 PROCEDURE — 73110 X-RAY EXAM OF WRIST: CPT | Mod: 26,RT,, | Performed by: RADIOLOGY

## 2025-02-26 RX ORDER — IBUPROFEN 800 MG/1
800 TABLET ORAL EVERY 6 HOURS PRN
Qty: 90 TABLET | Refills: 1 | Status: SHIPPED | OUTPATIENT
Start: 2025-02-26

## 2025-02-26 RX ORDER — HYDROCODONE BITARTRATE AND ACETAMINOPHEN 5; 325 MG/1; MG/1
1 TABLET ORAL EVERY 6 HOURS PRN
Qty: 20 TABLET | Refills: 0 | Status: SHIPPED | OUTPATIENT
Start: 2025-02-26

## 2025-02-26 RX ORDER — PSYLLIUM HUSK 0.4 G
1 CAPSULE ORAL 2 TIMES DAILY WITH MEALS
Qty: 60 TABLET | Refills: 3 | Status: SHIPPED | OUTPATIENT
Start: 2025-02-26 | End: 2026-02-26

## 2025-02-26 NOTE — PROGRESS NOTES
SUBJECTIVE:      Chief Complaint: No chief complaint on file.      History of Present Illness:  Patient is a right handed 64 y.o. who presents today due to a fracture of right distal radius.   KUSH:  Trip and fall (FOOSH)  DOI:  02/11/2025 (15 days)  Current tx:  Sugar-tong cast  Today, pt complains of wrist pain  Pain has been controlled, although complains breakthrough pain  Denies numbness/tingling      Past Medical History:   Diagnosis Date    Anxiety     Arthritis     COPD (chronic obstructive pulmonary disease)     Depression     GERD (gastroesophageal reflux disease)     Hypertension      Past Surgical History:   Procedure Laterality Date    COLONOSCOPY N/A 12/6/2018    Procedure: COLONOSCOPY;  Surgeon: Endy Moncada MD;  Location: [facility] ([address] [place] [address]);  Service: Endoscopy;  Laterality: N/A;  PM Prep - sm    ESOPHAGOGASTRODUODENOSCOPY N/A 3/10/2020    Procedure: EGD (ESOPHAGOGASTRODUODENOSCOPY);  Surgeon: Derrell Kovacs MD;  Location: [facility] ([address] [place] [address]);  Service: Endoscopy;  Laterality: N/A;     Review of patient's allergies indicates:  No Known Allergies  Social History     Social History Narrative    Not on file     Family History   Problem Relation Name Age of Onset    Cancer Mother      Cancer Brother      Diabetes Maternal Uncle      Colon cancer Neg Hx      Stomach cancer Neg Hx      Inflammatory bowel disease Neg Hx         Current Medications[1]    Review of Systems   Musculoskeletal:  Positive for joint pain, joint swelling, muscle weakness, myalgias and stiffness.   Neurological:  Negative for numbness and paresthesias.       OBJECTIVE:      Vital Signs (Most Recent):  There were no vitals filed for this visit.  There is no height or weight on file to calculate BMI.    Physical Exam:  right UE/wrist Examination:  Observation/Inspection:  Swelling  moderate   Deformity  none  Discoloration  ecchymosis present   Scars   none    Atrophy  none  Strength  Unable to assess  TTP at fracture  site  able to pinch skin over fracture site    Neurovascular Exam:  Digits WWP, brisk CR < 3s throughout  NVI motor/LTS to M/R/U nerves, radial pulse 2+    ROM hand decreased secondary to pain  ROM wrist decreased secondary to pain  ROM elbow full, painless      Diagnostic Results:  Imaging - I independently interpreted the patient's imaging. Xrays of the patient's R wrist which demonstrate an acute fracture of distal radius with some articular involvement.   Compared to previous xr, no significant displacement or angulation  No dislocations or significant degenerative changes.    ASSESSMENT/PLAN:      No diagnosis found.    64 y.o. y/o female with right distal radius fracture  Plan: The patient and I had a thorough discussion today.  We discussed the working diagnosis as well as several other potential alternative diagnoses.    We discussed conservative and surgical treatment options. Conservative options include rest, activity modifications, workplace modifications, po and topical analgesia (OTC and rx), formal or home PT, and others. Surgical treatment was also mentioned.    At this time, the patient would like to proceed with the following, which I agree with.    Medications:  New prescription sent today for pain medicine.  Prescription sent today for ibuprofen, which should be taken t.i.d..  Precautions advised, patient expressed understanding.  Patient reports concern that current pain regimen is not enough to fully control pain.  We discussed importance of taking OTC analgesia more regularly to have an additive effect.  Ice 20 minutes on 20 minutes off as needed for pain   Reiterated importance keeping hand elevated above her heart as often as possible to minimize swelling, which will also help with pain management  Physical Therapy:  We will consider in the  DME:  Placed in short-arm fiberglass cast today.  Cast precautions advised, patient expressed understanding.  Reiterated importance keeping hand  elevated above her heart as often as possible to minimize swelling  Work status: [employer].  No lifting, gripping, pushing, or pulling using the right hand at this time  Follow up: in 2-3 week(s).  Repeat x-ray    All of the patient's questions were answered and the patient will contact us if they have any questions or concerns in the interim.    Case discussed with Dr. Angelo Alonso PA-C  [facility]  Orthopedic Surgery           [1]   Current Outpatient Medications:     acyclovir (ZOVIRAX) 400 MG tablet, Take by mouth 2 (two) times daily., Disp: , Rfl:     albuterol (PROVENTIL/VENTOLIN HFA) 90 mcg/actuation inhaler, Inhale 2 puffs into the lungs., Disp: , Rfl:     aspirin (ECOTRIN) 81 MG EC tablet, Take 81 mg by mouth., Disp: , Rfl:     clopidogreL (PLAVIX) 75 mg tablet, Take 1 tablet by mouth once daily., Disp: , Rfl:     colchicine (COLCRYS) 0.6 mg tablet, Take 1 tablet (0.6 mg total) by mouth once daily., Disp: 30 tablet, Rfl: 11    cyproheptadine (PERIACTIN) 4 mg tablet, Take 4 mg by mouth., Disp: , Rfl:     diclofenac sodium (VOLTAREN) 1 % Gel, Apply 2 g topically 4 (four) times daily. For breast pain, Disp: 1 Tube, Rfl: 1    doxepin (SILENOR) 3 mg Tab, Take 3 mg by mouth every evening., Disp: 90 tablet, Rfl: 0    DULoxetine (CYMBALTA) 30 MG capsule, Take 30 mg by mouth once daily., Disp: , Rfl:     ferrous sulfate 325 (65 FE) MG EC tablet, Take 325 mg by mouth., Disp: , Rfl:     HYDROcodone-acetaminophen (NORCO) 5-325 mg per tablet, Take 1 tablet by mouth every 6 (six) hours as needed for Pain., Disp: 28 tablet, Rfl: 0    hydrOXYzine HCL (ATARAX) 25 MG tablet, Take 1 tablet (25 mg total) by mouth every 6 to 8 hours as needed for Itching., Disp: 20 tablet, Rfl: 0    omeprazole (PRILOSEC) 40 MG capsule, Take 40 mg by mouth., Disp: , Rfl:     oxybutynin (DITROPAN-XL) 10 MG 24 hr tablet, Take 10 mg by mouth once daily., Disp: , Rfl: 11    potassium chloride SA (K-DUR,KLOR-CON) 20 MEQ tablet, TAKE 1 TABLET  (20 MEQ) BY ORAL ROUTE ONCE DAILY WITH FOOD, Disp: , Rfl:     PROAIR HFA 90 mcg/actuation inhaler, Inhale 2 puffs into the lungs every 4 (four) hours as needed., Disp: , Rfl: 3    simethicone 42 mg Chew, Take 1 Dose by mouth 2 (two) times daily as needed., Disp: 60 tablet, Rfl: 0    sucralfate (CARAFATE) 1 gram tablet, Take 1 g by mouth., Disp: , Rfl:     VITAMIN D3 1,000 unit capsule, Take by mouth once daily., Disp: , Rfl: 2    Current Facility-Administered Medications:     triamcinolone acetonide injection 40 mg, 40 mg, INTRABURSAL, 1 time in Clinic/HOD, Walter Mendoza, DO

## 2025-03-01 ENCOUNTER — NURSE TRIAGE (OUTPATIENT)
Dept: ADMINISTRATIVE | Facility: CLINIC | Age: 65
End: 2025-03-01
Payer: MEDICARE

## 2025-03-01 NOTE — TELEPHONE ENCOUNTER
Speaking with pt who reports that she had cast placed 2/26 due to right wrist injury. States that yesterday she did trip over rug and feel and now has pain to thumb. States pain noted yesterday, and woke up with pain as well.    Called Dr. Melendez.  left    Dr. Melendez called back and states that pt should be seen in ED for evaluation.     Pt called back, and informed. She verbalized understanding      Reason for Disposition   [1] SEVERE pain (e.g., excruciating, pain scale 8-10) under cast AND [2] not improved after pain medications and elevation    Additional Information   Negative: Chest pain   Negative: Difficulty breathing    Protocols used: Cast Symptoms and Oavqrcxes-M-JA

## 2025-03-03 ENCOUNTER — HOSPITAL ENCOUNTER (EMERGENCY)
Facility: HOSPITAL | Age: 65
Discharge: HOME | End: 2025-03-03
Attending: EMERGENCY MEDICINE
Payer: MEDICARE

## 2025-03-03 ENCOUNTER — TELEPHONE (OUTPATIENT)
Dept: ORTHOPEDICS | Facility: CLINIC | Age: 65
End: 2025-03-03
Payer: MEDICARE

## 2025-03-03 VITALS
HEART RATE: 79 BPM | DIASTOLIC BLOOD PRESSURE: 78 MMHG | BODY MASS INDEX: 25.76 KG/M2 | WEIGHT: 140 LBS | HEIGHT: 62 IN | SYSTOLIC BLOOD PRESSURE: 130 MMHG | OXYGEN SATURATION: 99 % | RESPIRATION RATE: 20 BRPM | TEMPERATURE: 98 F

## 2025-03-03 DIAGNOSIS — S80.02XA CONTUSION OF LEFT KNEE, INITIAL ENCOUNTER: Primary | ICD-10-CM

## 2025-03-03 DIAGNOSIS — S52.501D CLOSED FRACTURE OF DISTAL END OF RIGHT RADIUS WITH ROUTINE HEALING, UNSPECIFIED FRACTURE MORPHOLOGY, SUBSEQUENT ENCOUNTER: ICD-10-CM
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
## 2025-03-03 PROCEDURE — 99284 EMERGENCY DEPT VISIT MOD MDM: CPT | Mod: 25

## 2025-03-03 RX ORDER — OXYCODONE AND ACETAMINOPHEN 5; 325 MG/1; MG/1
1 TABLET ORAL EVERY 6 HOURS PRN
Qty: 12 TABLET | Refills: 0 | Status: SHIPPED | OUTPATIENT
Start: 2025-03-03

## 2025-03-03 NOTE — TELEPHONE ENCOUNTER
----- Message from Jose L sent at 3/3/2025  9:59 AM CST -----  Type:  Same Day Appointment RequestCaller is requesting a same day appointment.  Name of Caller:pt When is the first available appointment?noneSymptoms:the patient got an cast put on 02/26 on her wrist pt hurt her thumb and is trying to be seen today Best Call Back Number: [phone] Information:

## 2025-03-03 NOTE — ED TRIAGE NOTES
Pt reports that she fell on 2/11. She was diagnosed with two fractures on the right wrist. She then fell again on the 2/22 and is now having pain to the right thumb. She currently has a cast in place.

## 2025-03-03 NOTE — FIRST PROVIDER EVALUATION
Emergency Department TeleTriage Encounter Note      CHIEF COMPLAINT    Chief Complaint   Patient presents with    Fall     Reports trip and fall Friday, c/o pain to R hand. Pt R lower arm in cast x1 week. Denies hitting head or LOC.        VITAL SIGNS   Initial Vitals [03/03/25 1058]   BP Pulse Resp Temp SpO2   (!) 144/82 92 20 98.1 °F (36.7 °C) 99 %      MAP       --            ALLERGIES    Review of patient's allergies indicates:  No Known Allergies    PROVIDER TRIAGE NOTE  This is a teletriage evaluation of a 64 y.o. female presenting to the ED complaining of right thumb pain after trip and fall on Friday. No head injury. Pt has cast on RUE.     Alert, no distress.     Initial orders will be placed and care will be transferred to an alternate provider when patient is roomed for a full evaluation. Any additional orders and the final disposition will be determined by that provider.         ORDERS  Labs Reviewed - No data to display    ED Orders (720h ago, onward)      Start Ordered     Status Ordering Provider    03/03/25 1135 03/03/25 1134  X-Ray Hand 3 view Left  1 time imaging         Ordered WENDY EMANUEL              Virtual Visit Note: The provider triage portion of this emergency department evaluation and documentation was performed via [employer], a HIPAA-compliant telemedicine application, in concert with a tele-presenter in the room. A face to face patient evaluation with one of my colleagues will occur once the patient is placed in an emergency department room.      DISCLAIMER: This note was prepared with [employer] voice recognition transcription software. Garbled syntax, mangled pronouns, and other bizarre constructions may be attributed to that software system.

## 2025-03-03 NOTE — ED NOTES
Patient presented to the ED with complaints of right thumb pain after a fall, states she fell and broke her wrist from two spots, cast was applied a week ago and then she fell tripped and fell again, she is complaining of right thumb pain and ecchymosis noted under the left knee, no visual deformity noted, no pain to knee while moving, denies hitting her head or LOC, states she is on blood thinner, no other complaints reported at this time, connected to the monitor, warm blanket provided, denies other needs at this time.

## 2025-03-03 NOTE — ED PROVIDER NOTES
Encounter Date: 3/3/2025       History     Chief Complaint   Patient presents with    Fall     Reports trip and fall Friday, c/o pain to R hand. Pt R lower arm in cast x1 week. Denies hitting head or LOC.      Patient is a 64 year old female who sustained a distal radius fracture a couple of weeks ago.  She has a cast in place.  She is followed for this by Dr. Stephens.  Patient is concerned about thumb pain now after another fall a few days ago.  She also injured her left knee in the recent fall.      Review of patient's allergies indicates:  No Known Allergies  Past Medical History:   Diagnosis Date    Anxiety     Arthritis     COPD (chronic obstructive pulmonary disease)     Depression     GERD (gastroesophageal reflux disease)     Hypertension      Past Surgical History:   Procedure Laterality Date    COLONOSCOPY N/A 12/6/2018    Procedure: COLONOSCOPY;  Surgeon: Endy Moncada MD;  Location: [facility] ([address] [place] [address]);  Service: Endoscopy;  Laterality: N/A;  PM Prep - sm    ESOPHAGOGASTRODUODENOSCOPY N/A 3/10/2020    Procedure: EGD (ESOPHAGOGASTRODUODENOSCOPY);  Surgeon: Derrell Kovacs MD;  Location: [address]);  Service: Endoscopy;  Laterality: N/A;     Family History   Problem Relation Name Age of Onset    Cancer Mother      Cancer Brother      Diabetes Maternal Uncle      Colon cancer Neg Hx      Stomach cancer Neg Hx      Inflammatory bowel disease Neg Hx       Social History[1]  Review of Systems   Musculoskeletal:         Right thumb pain.  Left knee pain.   All other systems reviewed and are negative.      Physical Exam     Initial Vitals [03/03/25 1058]   BP Pulse Resp Temp SpO2   (!) 144/82 92 20 98.1 °F (36.7 °C) 99 %      MAP       --         Physical Exam    Nursing note and vitals reviewed.  Constitutional: No distress.   HENT:   Head: Atraumatic.   Cardiovascular:  Normal rate, regular rhythm and normal heart sounds.           Pulmonary/Chest: Breath sounds normal.   Abdominal: Abdomen is  soft. There is no abdominal tenderness.   Musculoskeletal:      Comments: RIGHT UPPER EXTREMITY :  Short-arm cast noted on the right upper extremity.  Cap refill <2 sec. No sensory deficits.    LEFT KNEE :  Left knee with anterior bruise noted and mild diffuse tenderness.  Full range of motion.  No appreciable effusion.     Neurological: She is alert and oriented to person, place, and time.   Skin: Skin is warm and dry.   Psychiatric: Thought content normal.         ED Course   Procedures  Labs Reviewed - No data to display       Imaging Results              X-Ray Knee 1 or 2 View Left (Final result)  Result time 03/03/25 14:17:23      Final result by David Sloan MD (03/03/25 14:17:23)                   Impression:      1. No acute displaced fracture or dislocation of the knee.      Electronically signed by: David Sloan MD  Date:    03/03/2025  Time:    14:17               Narrative:    EXAMINATION:  XR KNEE 1 OR 2 VIEW LEFT    CLINICAL HISTORY:  Unspecified fall, initial encounter    TECHNIQUE:  One or two views of the left knee were performed.    COMPARISON:  02/25/2019    FINDINGS:  Two views right knee.    No acute displaced fracture or dislocation of the knee.  There is osteopenia.  There is medial compartmental narrowing.  No large knee joint effusion.  No significant edema.                                       X-Ray Hand 3 View Right (Final result)  Result time 03/03/25 12:45:43   Procedure changed from X-Ray Hand 3 view Left     Final result by Walter Swift Jr., MD (03/03/25 12:45:43)                   Impression:      See above      Electronically signed by: Walter Huff Jr  Date:    03/03/2025  Time:    12:45               Narrative:    EXAMINATION:  XR HAND COMPLETE 3 VIEW RIGHT    CLINICAL HISTORY:  fall;    TECHNIQUE:  XR HAND COMPLETE 3 VIEW RIGHT    COMPARISON:  02/26/2025    FINDINGS:  Seen through the splint is a comminuted intra-articular distal radius fracture and a  mildly displaced base of ulnar styloid fracture.  Alignment is grossly anatomic.  Some osteoarthritis is suspected at the base of the thumb.                                       Medications - No data to display  Medical Decision Making  Emergent evaluation of a 64-year-old female who had a recent right distal radius fracture.  She is followed by Dr. Stephens for this and currently has a cast on.  She sustained a fall a few days ago injuring her left knee.  She is now concerned because she has pain to the right thumb.  X-ray shows the prior intra-articular distal radius fracture with good alignment.  There degenerative changes at the base of the thumb which likely accounts for her current thumb pain.  No visible fractures of the thumb.  X-rays of the left knee show no fracture or dislocation.  She will follow up with Dr. Stephens concerning her distal radius fracture.  She may return to the ED as needed.    Amount and/or Complexity of Data Reviewed  Radiology: ordered.     Details: Left knee x-ray without fracture or dislocation.  Right wrist x-ray shows prior fracture, no acute abnormalities.    Risk  Prescription drug management.                                      Clinical Impression:  Final diagnoses:  [W19.XXXA] Fall  [S80.02XA] Contusion of left knee, initial encounter (Primary)  [S52.501D] Closed fracture of distal end of right radius with routine healing, unspecified fracture morphology, subsequent encounter          ED Disposition Condition    Discharge Stable          ED Prescriptions       Medication Sig Dispense Start Date End Date Auth. Provider    oxyCODONE-acetaminophen (PERCOCET) 5-325 mg per tablet Take 1 tablet by mouth every 6 (six) hours as needed for Pain. 12 tablet 3/3/2025 -- Akash Vang MD          Follow-up Information       Follow up With Specialties Details Why Contact Info    Jay Stephens Jr., MD Hand Surgery, Orthopedic Surgery   [address] [facility]  [address]  [place]  [address]  [phone]                   [1]   Social History  Tobacco Use    Smoking status: Former     Current packs/day: 0.00     Types: Cigarettes     Quit date: 1995     Years since quittin.2    Smokeless tobacco: Never   Substance Use Topics    Alcohol use: Yes     Comment: weekends    Drug use: No        Akash Vang MD  25 [id number]

## 2025-03-14 VITALS
RESPIRATION RATE: 18 BRPM | SYSTOLIC BLOOD PRESSURE: 127 MMHG | TEMPERATURE: 99 F | DIASTOLIC BLOOD PRESSURE: 86 MMHG | HEART RATE: 90 BPM | BODY MASS INDEX: 25.76 KG/M2 | WEIGHT: 140 LBS | HEIGHT: 62 IN | OXYGEN SATURATION: 97 %

## 2025-03-14 PROCEDURE — 99283 EMERGENCY DEPT VISIT LOW MDM: CPT

## 2025-03-15 ENCOUNTER — HOSPITAL ENCOUNTER (EMERGENCY)
Facility: HOSPITAL | Age: 65
Discharge: HOME | End: 2025-03-15
Attending: STUDENT IN AN ORGANIZED HEALTH CARE EDUCATION/TRAINING PROGRAM
Payer: MEDICARE

## 2025-03-15 DIAGNOSIS — U07.1 COVID-19 VIRUS DETECTED: ICD-10-CM

## 2025-03-15 DIAGNOSIS — U07.1 COVID: Primary | ICD-10-CM

## 2025-03-15 LAB
INFLUENZA A, MOLECULAR: NEGATIVE
INFLUENZA B, MOLECULAR: NEGATIVE
SARS-COV-2 RDRP RESP QL NAA+PROBE: POSITIVE
SPECIMEN SOURCE: NORMAL

## 2025-03-15 PROCEDURE — 87502 INFLUENZA DNA AMP PROBE: CPT | Performed by: EMERGENCY MEDICINE

## 2025-03-15 PROCEDURE — 87635 SARS-COV-2 COVID-19 AMP PRB: CPT | Performed by: EMERGENCY MEDICINE

## 2025-03-15 RX ORDER — BENZONATATE 100 MG/1
100 CAPSULE ORAL 3 TIMES DAILY PRN
Qty: 20 CAPSULE | Refills: 0 | Status: SHIPPED | OUTPATIENT
Start: 2025-03-15 | End: 2025-03-25

## 2025-03-15 NOTE — ED PROVIDER NOTES
NAME:  Wen Mares  CSN:     [id number]  MRN:    [id number]  ADMIT DATE: 3/14/2025        eMERGENCY dEPARTMENT eNCOUnter    CHIEF COMPLAINT    Chief Complaint   Patient presents with    Cough     Pt presents to the ED c/o cough x2 days. Pt states she wants to be teste for the flu       HPI    Wen Mares is a 64 y.o. female with a past medical history of  has a past medical history of Anxiety, Arthritis, COPD (chronic obstructive pulmonary disease), Depression, GERD (gastroesophageal reflux disease), and Hypertension.     +productive cough, associated chest discomfort with coughing.  No other symptoms reported.  No fever.    HPI       PAST MEDICAL HISTORY  Past Medical History:   Diagnosis Date    Anxiety     Arthritis     COPD (chronic obstructive pulmonary disease)     Depression     GERD (gastroesophageal reflux disease)     Hypertension        SURGICAL HISTORY    Past Surgical History:   Procedure Laterality Date    COLONOSCOPY N/A 2018    Procedure: COLONOSCOPY;  Surgeon: Endy Moncada MD;  Location: [address]);  Service: Endoscopy;  Laterality: N/A;  PM Prep - sm    ESOPHAGOGASTRODUODENOSCOPY N/A 3/10/2020    Procedure: EGD (ESOPHAGOGASTRODUODENOSCOPY);  Surgeon: Derrell Kovacs MD;  Location: [address]);  Service: Endoscopy;  Laterality: N/A;       FAMILY HISTORY    Family History   Problem Relation Name Age of Onset    Cancer Mother      Cancer Brother      Diabetes Maternal Uncle      Colon cancer Neg Hx      Stomach cancer Neg Hx      Inflammatory bowel disease Neg Hx         SOCIAL HISTORY    Social History     Socioeconomic History    Marital status: Single   Tobacco Use    Smoking status: Former     Current packs/day: 0.00     Types: Cigarettes     Quit date: 1995     Years since quittin.2    Smokeless tobacco: Never   Substance and Sexual Activity    Alcohol use: Yes     Comment: weekends    Drug use: No       MEDICATIONS  Current Outpatient  "Medications   Medication Instructions    acyclovir (ZOVIRAX) 400 MG tablet 2 times daily    albuterol (PROVENTIL/VENTOLIN HFA) 90 mcg/actuation inhaler 2 puffs    aspirin (ECOTRIN) 81 mg    benzonatate (TESSALON) 100 mg, Oral, 3 times daily PRN    calcium-vitamin D3 (CALCIUM 500 + D) 500 mg-5 mcg (200 unit) per tablet 1 tablet, Oral, 2 times daily with meals    clopidogreL (PLAVIX) 75 mg tablet 1 tablet, Daily    colchicine (COLCRYS) 0.6 mg, Oral, Daily    cyproheptadine (PERIACTIN) 4 mg    diclofenac sodium (VOLTAREN) 2 g, Topical (Top), 4 times daily, For breast pain    doxepin (SILENOR) 3 mg, Oral, Nightly    DULoxetine (CYMBALTA) 30 mg, Daily    ferrous sulfate 325 mg    HYDROcodone-acetaminophen (NORCO) 5-325 mg per tablet 1 tablet, Oral, Every 6 hours PRN    HYDROcodone-acetaminophen (NORCO) 5-325 mg per tablet 1 tablet, Oral, Every 6 hours PRN    hydrOXYzine HCL (ATARAX) 25 mg, Oral, Every 6-8 hours PRN    ibuprofen (ADVIL,MOTRIN) 800 mg, Oral, Every 6 hours PRN    omeprazole (PRILOSEC) 40 mg    oxybutynin (DITROPAN-XL) 10 mg, Daily    oxyCODONE-acetaminophen (PERCOCET) 5-325 mg per tablet 1 tablet, Oral, Every 6 hours PRN    potassium chloride SA (K-DUR,KLOR-CON) 20 MEQ tablet TAKE 1 TABLET (20 MEQ) BY ORAL ROUTE ONCE DAILY WITH FOOD    PROAIR HFA 90 mcg/actuation inhaler 2 puffs, Every 4 hours PRN    simethicone 42 mg Chew 1 Dose, Oral, 2 times daily PRN    sucralfate (CARAFATE) 1 g    VITAMIN D3 1,000 unit capsule Daily       ALLERGIES    Review of patient's allergies indicates:  No Known Allergies      REVIEW OF SYSTEMS   Review of Systems       PHYSICAL EXAM    Reviewed Triage Note    VITAL SIGNS:   ED Triage Vitals [03/14/25 2304]   Encounter Vitals Group      /86      Systolic BP Percentile       Diastolic BP Percentile       Pulse 90      Resp 18      Temp 99.4 °F (37.4 °C)      Temp Source Oral      SpO2 97 %      Weight 140 lb      Height 5' 2"      Head Circumference       Peak Flow       " "Pain Score       Pain Loc       Pain Education       Exclude from Growth Chart        Patient Vitals for the past 24 hrs:   BP Temp Temp src Pulse Resp SpO2 Height Weight   03/14/25 2304 127/86 99.4 °F (37.4 °C) Oral 90 18 97 % 5' 2" (1.575 m) 63.5 kg (140 lb)       Physical Exam    Nursing note and vitals reviewed.  Constitutional: She appears well-developed and well-nourished.   HENT:   Head: Normocephalic and atraumatic. Mouth/Throat: Oropharynx is clear and moist. No oropharyngeal exudate.   Eyes: EOM are normal. Pupils are equal, round, and reactive to light.   Neck: Neck supple.   Normal range of motion.  Cardiovascular:  Normal rate and regular rhythm.           Pulmonary/Chest: Breath sounds normal. No respiratory distress.   Abdominal: Abdomen is soft. There is no abdominal tenderness.   Musculoskeletal:         General: Normal range of motion.      Cervical back: Normal range of motion and neck supple.     Neurological: She is alert and oriented to person, place, and time.   Skin: Skin is warm and dry.   Psychiatric: She has a normal mood and affect.            EKG     Interpreted by EM physician if performed:               LABS  Pertinent labs reviewed. (See chart for details)   Labs Reviewed   SARS-COV-2 RNA AMPLIFICATION, QUAL - Abnormal       Result Value    SARS-CoV-2 RNA, Amplification, Qual Positive (*)    INFLUENZA A & B BY MOLECULAR    Influenza A, Molecular Negative      Influenza B, Molecular Negative      Flu A & B Source Nasal swab           RADIOLOGY          Imaging Results    None           PROCEDURES    Procedures      ED COURSE & MEDICAL DECISION MAKING    Pertinent Labs & Imaging studies reviewed. (See chart for details and specific orders.)          Summary of review of records:       Medical Decision Making  Risk  Prescription drug management.      Wen Mares is a 64 y.o. female who presents for 2 days of URI symptoms.  COVID positive.  No secondary signs of respiratory " distress and clinically low suspicion for pneumonia at this time.  Unfortunately she can not take Paxlovid as she is on Plavix and this was discussed with her extensively.  Supportive care provided and all questions addressed.  Reasons to return discussed.            Medications - No data to display               FINAL IMPRESSION    Final diagnoses:  [U07.1] COVID (Primary)       DISPOSITION  Patient discharge in stable condition        ED Prescriptions       Medication Sig Dispense Start Date End Date Auth. Provider    benzonatate (TESSALON) 100 MG capsule Take 1 capsule (100 mg total) by mouth 3 (three) times daily as needed for Cough. 20 capsule 3/15/2025 3/25/2025 Sapphire Douglas, DO          Follow-up Information    None           DISCLAIMER: This note was prepared with [employer] voice recognition transcription software. Garbled syntax, mangled pronouns, and other bizarre constructions may be attributed to that software system.             Sapphire Douglas,   03/16/25 0115

## 2025-03-16 ENCOUNTER — OCHSNER VIRTUAL EMERGENCY DEPARTMENT (OUTPATIENT)
Facility: CLINIC | Age: 65
End: 2025-03-16
Payer: MEDICARE

## 2025-03-16 ENCOUNTER — NURSE TRIAGE (OUTPATIENT)
Dept: ADMINISTRATIVE | Facility: CLINIC | Age: 65
End: 2025-03-16
Payer: MEDICARE

## 2025-03-16 DIAGNOSIS — U07.1 COVID: Primary | ICD-10-CM

## 2025-03-16 NOTE — PLAN OF CARE-OVED
[facility] ([facility] Emergency Department Plan of Care Note  Referral Source: Nurse On-Call                               Chief Complaint   Patient presents with    COVID-19 Concerns       MDM:  Patient contacted nurse on-call line due to ongoing symptoms of COVID, recently diagnosed in the emergency department.  Patient states that she feels more short of breath then at evaluation yesterday.  She does carry a history of COPD, however the shortness of breath has not felt bad enough to where she has tried her inhaler/nebulizer.  Her primary concern for calling into the nurse line was that she is experiencing frequent diarrhea, and wanted to know if it was okay if she drank ensure as she is starting to feel dehydrated, to make sure it did not make symptoms worse.  She may indeed start ensure.  Also recommended She may take over-the-counter Imodium to help with the diarrhea. I have recommend she use her inhaler or nebulizer, if that does not help with the shortness of breath then she may need re-evaluation for further nebulizer treatments and/or steroids.  Patient understands return precautions, agrees to seek further treatment if necessary.    Recommendation: Treat in place                            Encounter Diagnosis   Name Primary?    COVID Yes

## 2025-03-16 NOTE — TELEPHONE ENCOUNTER
Reason for Disposition   MILD difficulty breathing (e.g., minimal/no SOB at rest, SOB with walking, pulse <100)    Additional Information   Negative: SEVERE difficulty breathing (e.g., struggling for each breath, speaks in single words)   Negative: Difficult to awaken or acting confused (e.g., disoriented, slurred speech)   Negative: Bluish (or gray) lips or face now   Negative: Shock suspected (e.g., cold/pale/clammy skin, too weak to stand, low BP, rapid pulse)   Negative: Sounds like a life-threatening emergency to the triager   Negative: SEVERE or constant chest pain or pressure  (Exception: Mild central chest pain, present only when coughing.)   Negative: MODERATE difficulty breathing (e.g., speaks in phrases, SOB even at rest, pulse 100-120)   Negative: [1] Headache AND [2] stiff neck (can't touch chin to chest)   Negative: Oxygen level (e.g., pulse oximetry) 90 percent or lower   Negative: Chest pain or pressure  (Exception: MILD central chest pain, present only when coughing.)   Negative: [1] Drinking very little AND [2] dehydration suspected (e.g., no urine > 12 hours, very dry mouth, very lightheaded)   Negative: Patient sounds very sick or weak to the triager    Protocols used: Coronavirus (COVID-19) Diagnosed or [id number]  Pt responded to the COVID Home System Monitoring text requesting to speak with a nurse. States she has difficulty breathing when walking that is worse from when she was seen in the ED. States the diarrhea is worse and she feels dehydrated. She is asking if she can drink ensure. Denies any other symptoms. She wants to know if she can take ensure. Referred to [facility] Provider on call  and he advised pt to use her inhaler/nebulizer since she stated she has not used it. Pt advised she can take imodium and it is okay to have ensure. Pt instructed to call [facility] back if she does not improve or becomes worse because she may need to be re-evaluated per Dr. Lee. Pt verbalized  understanding and has no further question or concerns.

## 2025-03-21 ENCOUNTER — OFFICE VISIT (OUTPATIENT)
Dept: ORTHOPEDICS | Facility: CLINIC | Age: 65
End: 2025-03-21
Payer: MEDICARE

## 2025-03-21 ENCOUNTER — HOSPITAL ENCOUNTER (OUTPATIENT)
Facility: HOSPITAL | Age: 65
Discharge: HOME | End: 2025-03-21
Payer: MEDICARE

## 2025-03-21 ENCOUNTER — TELEPHONE (OUTPATIENT)
Dept: ORTHOPEDICS | Facility: CLINIC | Age: 65
End: 2025-03-21

## 2025-03-21 DIAGNOSIS — M25.531 RIGHT WRIST PAIN: Primary | ICD-10-CM

## 2025-03-21 DIAGNOSIS — M25.531 RIGHT WRIST PAIN: ICD-10-CM

## 2025-03-21 DIAGNOSIS — S52.601A FRACTURE OF DISTAL RADIUS AND ULNA, RIGHT, CLOSED, INITIAL ENCOUNTER: Primary | ICD-10-CM

## 2025-03-21 DIAGNOSIS — S52.501A FRACTURE OF DISTAL RADIUS AND ULNA, RIGHT, CLOSED, INITIAL ENCOUNTER: Primary | ICD-10-CM

## 2025-03-21 PROCEDURE — 73110 X-RAY EXAM OF WRIST: CPT | Mod: TC,PN,RT

## 2025-03-21 PROCEDURE — 99999 PR PBB SHADOW E&M-EST. PATIENT-LVL IV: CPT | Mod: PBBFAC,,,

## 2025-03-21 PROCEDURE — 73110 X-RAY EXAM OF WRIST: CPT | Mod: 26,RT,, | Performed by: RADIOLOGY

## 2025-03-21 RX ORDER — ACETAMINOPHEN 500 MG
500 TABLET ORAL EVERY 8 HOURS PRN
Qty: 90 TABLET | Refills: 1 | Status: SHIPPED | OUTPATIENT
Start: 2025-03-21

## 2025-03-21 NOTE — PROGRESS NOTES
SUBJECTIVE:      Chief Complaint: Pain of the Right Wrist (Cast removal )      History of Present Illness:  Patient is a right handed 64 y.o. who presents today due to a fracture of right distal radius.   KUSH:  Trip and fall (FOOSH)  DOI:  02/11/2025 (5.5 weeks)  Current tx:  Short-arm cast  Had a subsequent fall on 03/03/2025, but main complaint at that time was knee pain  Today, pt complains of wrist pain, which is controlled with otc analgesia  Denies numbness/tingling      Past Medical History:   Diagnosis Date    Anxiety     Arthritis     COPD (chronic obstructive pulmonary disease)     Depression     GERD (gastroesophageal reflux disease)     Hypertension      Past Surgical History:   Procedure Laterality Date    COLONOSCOPY N/A 12/6/2018    Procedure: COLONOSCOPY;  Surgeon: Endy Moncada MD;  Location: [facility] ([address] [place] [address]);  Service: Endoscopy;  Laterality: N/A;  PM Prep - sm    ESOPHAGOGASTRODUODENOSCOPY N/A 3/10/2020    Procedure: EGD (ESOPHAGOGASTRODUODENOSCOPY);  Surgeon: Derrell Kovacs MD;  Location: [facility] ([address] [place] [address]);  Service: Endoscopy;  Laterality: N/A;     Review of patient's allergies indicates:  No Known Allergies  Social History     Social History Narrative    Not on file     Family History   Problem Relation Name Age of Onset    Cancer Mother      Cancer Brother      Diabetes Maternal Uncle      Colon cancer Neg Hx      Stomach cancer Neg Hx      Inflammatory bowel disease Neg Hx         Current Medications[1]    Review of Systems   Musculoskeletal:  Positive for joint pain, joint swelling, muscle weakness, myalgias and stiffness.   Neurological:  Negative for numbness and paresthesias.       OBJECTIVE:      Vital Signs (Most Recent):  There were no vitals filed for this visit.  There is no height or weight on file to calculate BMI.    Physical Exam:  right UE/wrist  Examination:  Observation/Inspection:  Swelling  mild   Deformity  none  Discoloration  none   Scars   none    Atrophy  none  Strength  Unable to assess  TTP at fracture site  able to pinch skin over fracture site    Neurovascular Exam:  Digits WWP, brisk CR < 3s throughout  NVI motor/LTS to M/R/U nerves, radial pulse 2+    ROM hand decreased secondary to pain  ROM wrist decreased secondary to pain  ROM elbow full, painless      Diagnostic Results:  Imaging - I independently interpreted the patient's imaging. Xrays of the patient's R wrist which demonstrate an acute fracture of distal radius with some articular involvement.   Compared to previous xr, signs of new bone production, no significant displacement or angulation  No dislocations or significant degenerative changes.    ASSESSMENT/PLAN:      1. Fracture of distal radius and ulna, right, closed, subsequentencounter    2. Right wrist pain        64 y.o. y/o female with right distal radius fracture  Plan: The patient and I had a thorough discussion today.  We discussed the working diagnosis as well as several other potential alternative diagnoses.    We discussed conservative and surgical treatment options. Conservative options include rest, activity modifications, workplace modifications, po and topical analgesia (OTC and rx), formal or home PT, and others. Surgical treatment was also mentioned.    At this time, the patient would like to proceed with the following, which I agree with.    Medications: continue current pain regimen. Precautions advised, patient expressed understanding.  Patient reports concern that current pain regimen is not enough to fully control pain.  We discussed importance of taking OTC analgesia more regularly to have an additive effect.  Ice 20 minutes on 20 minutes off as needed for pain   Reiterated importance keeping hand elevated above her heart as often as possible to minimize swelling, which will also help with pain  management  Occupational Therapy:  We will begin OT at follow-up appointment  DME: cast removed today in clinic.  Long forearm brace given to patient today to wear full-time.  Okay to remove for hygiene  Work status: [employer].  No lifting, gripping, pushing, or pulling using the right hand at this time  Follow up: in 2-3 week(s).  Repeat x-ray    All of the patient's questions were answered and the patient will contact us if they have any questions or concerns in the interim.      Jazz Alonso PA-C  [facility]  Orthopedic Surgery             [1]   Current Outpatient Medications:     acyclovir (ZOVIRAX) 400 MG tablet, Take by mouth 2 (two) times daily., Disp: , Rfl:     albuterol (PROVENTIL/VENTOLIN HFA) 90 mcg/actuation inhaler, Inhale 2 puffs into the lungs., Disp: , Rfl:     aspirin (ECOTRIN) 81 MG EC tablet, Take 81 mg by mouth., Disp: , Rfl:     benzonatate (TESSALON) 100 MG capsule, Take 1 capsule (100 mg total) by mouth 3 (three) times daily as needed for Cough., Disp: 20 capsule, Rfl: 0    calcium-vitamin D3 (CALCIUM 500 + D) 500 mg-5 mcg (200 unit) per tablet, Take 1 tablet by mouth 2 (two) times daily with meals., Disp: 60 tablet, Rfl: 3    clopidogreL (PLAVIX) 75 mg tablet, Take 1 tablet by mouth once daily., Disp: , Rfl:     colchicine (COLCRYS) 0.6 mg tablet, Take 1 tablet (0.6 mg total) by mouth once daily., Disp: 30 tablet, Rfl: 11    cyproheptadine (PERIACTIN) 4 mg tablet, Take 4 mg by mouth., Disp: , Rfl:     diclofenac sodium (VOLTAREN) 1 % Gel, Apply 2 g topically 4 (four) times daily. For breast pain, Disp: 1 Tube, Rfl: 1    doxepin (SILENOR) 3 mg Tab, Take 3 mg by mouth every evening., Disp: 90 tablet, Rfl: 0    DULoxetine (CYMBALTA) 30 MG capsule, Take 30 mg by mouth once daily., Disp: , Rfl:     ferrous sulfate 325 (65 FE) MG EC tablet, Take 325 mg by mouth., Disp: , Rfl:     HYDROcodone-acetaminophen (NORCO) 5-325 mg per tablet, Take 1 tablet by mouth every 6 (six) hours as needed for Pain.,  Disp: 28 tablet, Rfl: 0    HYDROcodone-acetaminophen (NORCO) 5-325 mg per tablet, Take 1 tablet by mouth every 6 (six) hours as needed for Pain., Disp: 20 tablet, Rfl: 0    hydrOXYzine HCL (ATARAX) 25 MG tablet, Take 1 tablet (25 mg total) by mouth every 6 to 8 hours as needed for Itching., Disp: 20 tablet, Rfl: 0    ibuprofen (ADVIL,MOTRIN) 800 MG tablet, Take 1 tablet (800 mg total) by mouth every 6 (six) hours as needed for Pain., Disp: 90 tablet, Rfl: 1    omeprazole (PRILOSEC) 40 MG capsule, Take 40 mg by mouth., Disp: , Rfl:     oxybutynin (DITROPAN-XL) 10 MG 24 hr tablet, Take 10 mg by mouth once daily., Disp: , Rfl: 11    oxyCODONE-acetaminophen (PERCOCET) 5-325 mg per tablet, Take 1 tablet by mouth every 6 (six) hours as needed for Pain., Disp: 12 tablet, Rfl: 0    potassium chloride SA (K-DUR,KLOR-CON) 20 MEQ tablet, TAKE 1 TABLET (20 MEQ) BY ORAL ROUTE ONCE DAILY WITH FOOD, Disp: , Rfl:     PROAIR HFA 90 mcg/actuation inhaler, Inhale 2 puffs into the lungs every 4 (four) hours as needed., Disp: , Rfl: 3    simethicone 42 mg Chew, Take 1 Dose by mouth 2 (two) times daily as needed., Disp: 60 tablet, Rfl: 0    sucralfate (CARAFATE) 1 gram tablet, Take 1 g by mouth., Disp: , Rfl:     VITAMIN D3 1,000 unit capsule, Take by mouth once daily., Disp: , Rfl: 2    Current Facility-Administered Medications:     triamcinolone acetonide injection 40 mg, 40 mg, INTRABURSAL, 1 time in Clinic/HOD, Walter Mendoza DO

## 2025-03-22 ENCOUNTER — HOSPITAL ENCOUNTER (EMERGENCY)
Facility: HOSPITAL | Age: 65
Discharge: HOME | End: 2025-03-22
Attending: EMERGENCY MEDICINE
Payer: MEDICARE

## 2025-03-22 ENCOUNTER — NURSE TRIAGE (OUTPATIENT)
Dept: ADMINISTRATIVE | Facility: CLINIC | Age: 65
End: 2025-03-22
Payer: MEDICARE

## 2025-03-22 VITALS
TEMPERATURE: 98 F | OXYGEN SATURATION: 97 % | BODY MASS INDEX: 25.76 KG/M2 | HEART RATE: 93 BPM | HEIGHT: 62 IN | SYSTOLIC BLOOD PRESSURE: 133 MMHG | RESPIRATION RATE: 23 BRPM | WEIGHT: 140 LBS | DIASTOLIC BLOOD PRESSURE: 90 MMHG

## 2025-03-22 DIAGNOSIS — R07.9 CHEST PAIN: ICD-10-CM

## 2025-03-22 LAB
ABSOLUTE EOSINOPHIL (OHS): 0.07 K/UL
ABSOLUTE MONOCYTE (OHS): 0.35 K/UL (ref 0.3–1)
ABSOLUTE NEUTROPHIL COUNT (OHS): 4.25 K/UL (ref 1.8–7.7)
ALBUMIN SERPL BCP-MCNC: 4.1 G/DL (ref 3.5–5.2)
ALP SERPL-CCNC: 101 UNIT/L (ref 40–150)
ALT SERPL W/O P-5'-P-CCNC: 26 UNIT/L (ref 10–44)
ANION GAP (OHS): 12 MMOL/L (ref 8–16)
AST SERPL-CCNC: 18 UNIT/L (ref 11–45)
BASOPHILS # BLD AUTO: 0.03 K/UL
BASOPHILS NFR BLD AUTO: 0.5 %
BILIRUB SERPL-MCNC: 0.4 MG/DL (ref 0.1–1)
BUN SERPL-MCNC: 17 MG/DL (ref 8–23)
CALCIUM SERPL-MCNC: 9 MG/DL (ref 8.7–10.5)
CHLORIDE SERPL-SCNC: 108 MMOL/L (ref 95–110)
CO2 SERPL-SCNC: 21 MMOL/L (ref 23–29)
CREAT SERPL-MCNC: 0.8 MG/DL (ref 0.5–1.4)
D DIMER PPP IA.FEU-MCNC: 0.31 MG/L FEU
ERYTHROCYTE [DISTWIDTH] IN BLOOD BY AUTOMATED COUNT: 12.8 % (ref 11.5–14.5)
GFR SERPLBLD CREATININE-BSD FMLA CKD-EPI: >60 ML/MIN/1.73/M2
GLUCOSE SERPL-MCNC: 138 MG/DL (ref 70–110)
HCT VFR BLD AUTO: 42.4 % (ref 37–48.5)
HGB BLD-MCNC: 14.2 GM/DL (ref 12–16)
IMM GRANULOCYTES # BLD AUTO: 0.03 K/UL (ref 0–0.04)
IMM GRANULOCYTES NFR BLD AUTO: 0.5 % (ref 0–0.5)
LYMPHOCYTES # BLD AUTO: 1.3 K/UL (ref 1–4.8)
MCH RBC QN AUTO: 28.9 PG (ref 27–50)
MCHC RBC AUTO-ENTMCNC: 33.5 G/DL (ref 32–36)
MCV RBC AUTO: 86 FL (ref 82–98)
NUCLEATED RBC (/100WBC) (OHS): 0 /100 WBC
PLATELET # BLD AUTO: 328 K/UL (ref 150–450)
PMV BLD AUTO: 8.6 FL (ref 9.2–12.9)
POTASSIUM SERPL-SCNC: 3.8 MMOL/L (ref 3.5–5.1)
PROT SERPL-MCNC: 7.8 GM/DL (ref 6–8.4)
RBC # BLD AUTO: 4.91 M/UL (ref 4–5.4)
RELATIVE EOSINOPHIL (OHS): 1.2 %
RELATIVE LYMPHOCYTE (OHS): 21.6 % (ref 18–48)
RELATIVE MONOCYTE (OHS): 5.8 % (ref 4–15)
RELATIVE NEUTROPHIL (OHS): 70.4 % (ref 38–73)
SODIUM SERPL-SCNC: 141 MMOL/L (ref 136–145)
TROPONIN I SERPL DL<=0.01 NG/ML-MCNC: <0.006 NG/ML
WBC # BLD AUTO: 6.03 K/UL (ref 3.9–12.7)

## 2025-03-22 PROCEDURE — 93005 ELECTROCARDIOGRAM TRACING: CPT

## 2025-03-22 PROCEDURE — 80053 COMPREHEN METABOLIC PANEL: CPT | Performed by: EMERGENCY MEDICINE

## 2025-03-22 PROCEDURE — 85379 FIBRIN DEGRADATION QUANT: CPT | Performed by: EMERGENCY MEDICINE

## 2025-03-22 PROCEDURE — 63600175 PHARM REV CODE 636 W HCPCS: Mod: JZ,TB | Performed by: EMERGENCY MEDICINE

## 2025-03-22 PROCEDURE — 25000003 PHARM REV CODE 250: Performed by: EMERGENCY MEDICINE

## 2025-03-22 PROCEDURE — 93010 ELECTROCARDIOGRAM REPORT: CPT | Mod: ,,, | Performed by: STUDENT IN AN ORGANIZED HEALTH CARE EDUCATION/TRAINING PROGRAM

## 2025-03-22 PROCEDURE — 96374 THER/PROPH/DIAG INJ IV PUSH: CPT

## 2025-03-22 PROCEDURE — 84484 ASSAY OF TROPONIN QUANT: CPT | Performed by: EMERGENCY MEDICINE

## 2025-03-22 PROCEDURE — 85025 COMPLETE CBC W/AUTO DIFF WBC: CPT | Performed by: EMERGENCY MEDICINE

## 2025-03-22 PROCEDURE — 99285 EMERGENCY DEPT VISIT HI MDM: CPT | Mod: 25

## 2025-03-22 RX ORDER — NAPROXEN 500 MG/1
500 TABLET ORAL 2 TIMES DAILY WITH MEALS
Qty: 14 TABLET | Refills: 0 | Status: SHIPPED | OUTPATIENT
Start: 2025-03-22 | End: 2025-03-29

## 2025-03-22 RX ORDER — NAPROXEN 500 MG/1
500 TABLET ORAL 2 TIMES DAILY WITH MEALS
Qty: 28 TABLET | Refills: 0 | Status: SHIPPED | OUTPATIENT
Start: 2025-03-22 | End: 2025-03-22

## 2025-03-22 RX ORDER — ACETAMINOPHEN 500 MG
1000 TABLET ORAL
Status: COMPLETED | OUTPATIENT
Start: 2025-03-22 | End: 2025-03-22

## 2025-03-22 RX ORDER — KETOROLAC TROMETHAMINE 30 MG/ML
15 INJECTION, SOLUTION INTRAMUSCULAR; INTRAVENOUS
Status: COMPLETED | OUTPATIENT
Start: 2025-03-22 | End: 2025-03-22

## 2025-03-22 RX ADMIN — ACETAMINOPHEN 1000 MG: 500 TABLET ORAL at 05:03

## 2025-03-22 RX ADMIN — KETOROLAC TROMETHAMINE 15 MG: 30 INJECTION, SOLUTION INTRAMUSCULAR at 05:03

## 2025-03-22 NOTE — PROVIDER PROGRESS NOTES - EMERGENCY DEPT.
Encounter Date: 3/22/2025    ED Physician Progress Notes            64-year-old female signed out pending chest x-ray read.  CXR  Impression:   No acute abnormality.  Right basilar atelectasis, stable.    HEART score 3. No indication for admission at this time. Given strict return precautions and cardiology referral placed for f/u.     No acute emergent medical condition has been identified. The patient appears to be low risk for an emergent medical condition is appropriate for discharge with outpatient f/u as detailed in discharge instructions for reevaluation and possible continued outpatient workup and management. I have discussed the workup with the patient, who has verbalized understanding of the plan and need for outpatient follow-up.  This evaluation does not preclude the development of an emergent condition so in addition, I have advised the patient that they can return to the ED at any time with worsening or change of their symptoms, or with any other medical complaint.

## 2025-03-22 NOTE — ED NOTES
Patient is discharged from IR. IV is removed. Site is dressed, and patient is advised to leave dressing in place for at least 20 to 30 minutes. AVS is printed and reviewed. Upcoming appointments, referrals, and the [facility] number is highlighted for convenience. The opportunity to ask questions is provided. Patient is wheeled to her vehicle per her request by PCT and security.

## 2025-03-22 NOTE — ED NOTES
"Patient complaint of chest pain at 10/10 on the pain scale. She reports pain began three days ago and has been occurring "off and on" since then. She report a recent Covid infection as of 3/14/2025. She reports shortness of breath. She states that "[place] cough drops" improves symptoms and activity worsens them.   "

## 2025-03-22 NOTE — ED PROVIDER NOTES
"Encounter Date: 3/22/2025       History     Chief Complaint   Patient presents with    Chest Pain     Covid pos 3/15. C/o constant CP since covid dx. Pain described as "squeezing" 10/10. Denies taking anything for pain.      Patient is a 64-year-old female who says she was diagnosed with COVID-19 infection 1 week ago.  She says since that time she has been experiencing intermittent left-sided chest pain.  She also has shortness of breath which she attributes to COPD.  She has had an occasional dry cough.  No fever or chills.      Review of patient's allergies indicates:  No Known Allergies  Past Medical History:   Diagnosis Date    Anxiety     Arthritis     COPD (chronic obstructive pulmonary disease)     Depression     GERD (gastroesophageal reflux disease)     Hypertension      Past Surgical History:   Procedure Laterality Date    COLONOSCOPY N/A 12/6/2018    Procedure: COLONOSCOPY;  Surgeon: Endy Moncada MD;  Location: [facility] ([address] [place] [address]);  Service: Endoscopy;  Laterality: N/A;  PM Prep - sm    ESOPHAGOGASTRODUODENOSCOPY N/A 3/10/2020    Procedure: EGD (ESOPHAGOGASTRODUODENOSCOPY);  Surgeon: Derrell Kovacs MD;  Location: [facility] ([address] [place] [address]);  Service: Endoscopy;  Laterality: N/A;     Family History   Problem Relation Name Age of Onset    Cancer Mother      Cancer Brother      Diabetes Maternal Uncle      Colon cancer Neg Hx      Stomach cancer Neg Hx      Inflammatory bowel disease Neg Hx       Social History[1]  Review of Systems   Constitutional:  Negative for chills and fever.   Respiratory:  Positive for cough and shortness of breath.    Cardiovascular:  Positive for chest pain.   All other systems reviewed and are negative.      Physical Exam     Initial Vitals   BP Pulse Resp Temp SpO2   03/22/25 1329 03/22/25 1329 03/22/25 1329 03/22/25 1415 03/22/25 1329   (!) 154/83 108 20 97.7 °F (36.5 °C) 98 %      MAP       --                Physical Exam    Nursing note and vitals reviewed.  Constitutional: " No distress.   Neck: Neck supple.   Cardiovascular:  Normal rate, regular rhythm and normal heart sounds.           Pulmonary/Chest: Breath sounds normal. No respiratory distress.   Abdominal: Abdomen is soft.   Musculoskeletal:         General: Normal range of motion.      Cervical back: Neck supple.     Neurological: She is alert and oriented to person, place, and time.   Skin: Skin is warm and dry.   Psychiatric: Thought content normal.         ED Course   Procedures  Labs Reviewed   COMPREHENSIVE METABOLIC PANEL - Abnormal       Result Value    Sodium 141      Potassium 3.8      Chloride 108      CO2 21 (*)     Glucose 138 (*)     BUN 17      Creatinine 0.8      Calcium 9.0      Protein Total 7.8      Albumin 4.1      Bilirubin Total 0.4            AST 18      ALT 26      Anion Gap 12      eGFR >60     CBC WITH DIFFERENTIAL - Abnormal    WBC 6.03      RBC 4.91      HGB 14.2      HCT 42.4      MCV 86      MCH 28.9      MCHC 33.5      RDW 12.8      Platelet Count 328      MPV 8.6 (*)     Nucleated RBC 0      Neut % 70.4      Lymph % 21.6      Mono % 5.8      Eos % 1.2      Basophil % 0.5      Imm Grans % 0.5      Neut # 4.25      Lymph # 1.30      Mono # 0.35      Eos # 0.07      Baso # 0.03      Imm Grans # 0.03     TROPONIN I - Normal    Troponin-I <0.006     D DIMER, QUANTITATIVE - Normal    D-Dimer 0.31     CBC W/ AUTO DIFFERENTIAL    Narrative:     The following orders were created for panel order CBC auto differential.  Procedure                               Abnormality         Status                     ---------                               -----------         ------                     CBC with Differential[[id number]]       Abnormal            Final result                 Please view results for these tests on the individual orders.     EKG Readings: (Independently Interpreted)   Initial Reading: No STEMI. Rhythm: Sinus Tachycardia. Heart Rate: 103. ST Segments: Normal ST Segments. Q Waves: AVL.        Imaging Results              X-Ray Chest 1 View (Final result)  Result time 03/22/25 18:03:11      Final result by Rafael Basilio DO (03/22/25 18:03:11)                   Impression:      No acute abnormality.  Right basilar atelectasis, stable.      Electronically signed by: Rafael Basilio  Date:    03/22/2025  Time:    18:03               Narrative:    EXAMINATION:  XR CHEST 1 VIEW    CLINICAL HISTORY:  Chest pain;    TECHNIQUE:  Single frontal view of the chest was performed.    COMPARISON:  07/05/2024.    FINDINGS:  There are postop changes of multiple left-sided ribs.  There is right basilar atelectasis or scarring, similar to the prior study.  The remaining lungs are clear.  The pleural spaces are clear.  Cardiac silhouette is unremarkable.  Osseous structures are intact.                                       Medications   ketorolac injection 15 mg (15 mg Intravenous Given 3/22/25 1755)   acetaminophen tablet 1,000 mg (1,000 mg Oral Given 3/22/25 1755)     Medical Decision Making  Emergent evaluation of a 64-year-old female who has been experiencing chest pain over the past 1 week.  All lab work is reassuring with a troponin of 0 and a normal D-dimer of 0.31.  Chest X-ray is pending at the time of shift change and further care of the patient will be turned over to Dr. Hobson pending results and final disposition. (Anticipate discharge.)    Amount and/or Complexity of Data Reviewed  Labs: ordered.     Details: CBC is unremarkable.  CMP is unremarkable.  Troponin is 0.  D-dimer is 0.31.  Radiology: ordered.      Additional MDM:   Heart Score:    History:          Slightly suspicious.  ECG:             Normal  Age:               >65 years  Risk factors: 1-2 risk factors  Troponin:       Less than or equal to normal limit  Heart Score = 3                                       Clinical Impression:  Final diagnoses:  [R07.9] Chest pain          ED Disposition Condition    Discharge Stable          ED  Prescriptions       Medication Sig Dispense Start Date End Date Auth. Provider    naproxen (NAPROSYN) 500 MG tablet  (Status: Discontinued) Take 1 tablet (500 mg total) by mouth 2 (two) times daily with meals. for 14 days 28 tablet 3/22/2025 3/22/2025 Luz Hobson MD    naproxen (NAPROSYN) 500 MG tablet Take 1 tablet (500 mg total) by mouth 2 (two) times daily with meals. for 7 days 14 tablet 3/22/2025 3/29/2025 Luz Hobson MD          Follow-up Information       Follow up With Specialties Details Why Contact Info    Cristine Austin FNP Family Medicine Schedule an appointment as soon as possible for a visit in 2 days  [address] [facility] [facility] [address]  [phone]      [facility] CARDIOLOGY Cardiology   [address] [place] [id number]  [phone]                 [1]   Social History  Tobacco Use    Smoking status: Former     Current packs/day: 0.00     Types: Cigarettes     Quit date: 1995     Years since quittin.2    Smokeless tobacco: Never   Substance Use Topics    Alcohol use: Yes     Comment: weekends    Drug use: No        Akash Vang MD  25 [id number]

## 2025-03-22 NOTE — DISCHARGE INSTRUCTIONS

## 2025-03-22 NOTE — TELEPHONE ENCOUNTER
Pt called in response to CV home symptom monitoring  program for an escalation of symptoms. Pt states she is having constant CP and thinks she needs CXR. States pain is not just with coughing. Care advice per protocol. Advised to go to ED for eval. Pt states she has a ride and will go to ED. Advised to wear mask and notify staff of CV + status. Advised to monitor and to call back with concerns or worsening symptoms. Verbalized understanding.     Reason for Disposition   SEVERE or constant chest pain or pressure  (Exception: Mild central chest pain, present only when coughing.)    Additional Information   Negative: SEVERE difficulty breathing (e.g., struggling for each breath, speaks in single words)   Negative: Difficult to awaken or acting confused (e.g., disoriented, slurred speech)   Negative: Bluish (or gray) lips or face now   Negative: Shock suspected (e.g., cold/pale/clammy skin, too weak to stand, low BP, rapid pulse)   Negative: Sounds like a life-threatening emergency to the triager    Protocols used: Coronavirus (COVID-19) Diagnosed or [id number]

## 2025-03-24 LAB
OHS QRS DURATION: 78 MS
OHS QTC CALCULATION: 466 MS

## 2025-03-28 ENCOUNTER — OFFICE VISIT (OUTPATIENT)
Dept: CARDIOLOGY | Facility: CLINIC | Age: 65
End: 2025-03-28
Payer: MEDICARE

## 2025-03-28 VITALS
DIASTOLIC BLOOD PRESSURE: 78 MMHG | HEIGHT: 62 IN | SYSTOLIC BLOOD PRESSURE: 112 MMHG | OXYGEN SATURATION: 96 % | WEIGHT: 144.94 LBS | BODY MASS INDEX: 26.67 KG/M2

## 2025-03-28 DIAGNOSIS — I10 HYPERTENSION, UNSPECIFIED TYPE: ICD-10-CM

## 2025-03-28 DIAGNOSIS — I10 ESSENTIAL HYPERTENSION: ICD-10-CM

## 2025-03-28 DIAGNOSIS — R07.81 RIB PAIN ON LEFT SIDE: ICD-10-CM

## 2025-03-28 DIAGNOSIS — M54.50 LUMBAR PAIN: Primary | ICD-10-CM

## 2025-03-28 DIAGNOSIS — R07.9 CHEST PAIN: ICD-10-CM

## 2025-03-28 PROCEDURE — 99999 PR PBB SHADOW E&M-EST. PATIENT-LVL V: CPT | Mod: PBBFAC,,, | Performed by: INTERNAL MEDICINE

## 2025-03-28 PROCEDURE — 3074F SYST BP LT 130 MM HG: CPT | Mod: CPTII,S$GLB,, | Performed by: INTERNAL MEDICINE

## 2025-03-28 PROCEDURE — 3288F FALL RISK ASSESSMENT DOCD: CPT | Mod: CPTII,S$GLB,, | Performed by: INTERNAL MEDICINE

## 2025-03-28 PROCEDURE — 3078F DIAST BP <80 MM HG: CPT | Mod: CPTII,S$GLB,, | Performed by: INTERNAL MEDICINE

## 2025-03-28 PROCEDURE — 3008F BODY MASS INDEX DOCD: CPT | Mod: CPTII,S$GLB,, | Performed by: INTERNAL MEDICINE

## 2025-03-28 PROCEDURE — 1100F PTFALLS ASSESS-DOCD GE2>/YR: CPT | Mod: CPTII,S$GLB,, | Performed by: INTERNAL MEDICINE

## 2025-03-28 PROCEDURE — 1159F MED LIST DOCD IN RCRD: CPT | Mod: CPTII,S$GLB,, | Performed by: INTERNAL MEDICINE

## 2025-03-28 PROCEDURE — 1125F AMNT PAIN NOTED PAIN PRSNT: CPT | Mod: CPTII,S$GLB,, | Performed by: INTERNAL MEDICINE

## 2025-03-28 PROCEDURE — 99214 OFFICE O/P EST MOD 30 MIN: CPT | Mod: S$GLB,,, | Performed by: INTERNAL MEDICINE

## 2025-04-01 ENCOUNTER — TELEPHONE (OUTPATIENT)
Dept: PAIN MEDICINE | Facility: CLINIC | Age: 65
End: 2025-04-01
Payer: MEDICARE
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
## 2025-04-10 ENCOUNTER — OFFICE VISIT (OUTPATIENT)
Dept: PAIN MEDICINE | Facility: CLINIC | Age: 65
End: 2025-04-10
Payer: MEDICARE

## 2025-04-10 VITALS
SYSTOLIC BLOOD PRESSURE: 124 MMHG | BODY MASS INDEX: 26.31 KG/M2 | WEIGHT: 142.94 LBS | DIASTOLIC BLOOD PRESSURE: 85 MMHG | HEART RATE: 103 BPM | HEIGHT: 62 IN

## 2025-04-10 DIAGNOSIS — R07.81 RIB PAIN: ICD-10-CM

## 2025-04-10 DIAGNOSIS — G58.8 INTERCOSTAL NEURALGIA: Primary | ICD-10-CM

## 2025-04-10 PROCEDURE — 3288F FALL RISK ASSESSMENT DOCD: CPT | Mod: CPTII,S$GLB,, | Performed by: NURSE PRACTITIONER

## 2025-04-10 PROCEDURE — 99214 OFFICE O/P EST MOD 30 MIN: CPT | Mod: S$GLB,,, | Performed by: NURSE PRACTITIONER

## 2025-04-10 PROCEDURE — 1101F PT FALLS ASSESS-DOCD LE1/YR: CPT | Mod: CPTII,S$GLB,, | Performed by: NURSE PRACTITIONER

## 2025-04-10 PROCEDURE — 3008F BODY MASS INDEX DOCD: CPT | Mod: CPTII,S$GLB,, | Performed by: NURSE PRACTITIONER

## 2025-04-10 PROCEDURE — 1160F RVW MEDS BY RX/DR IN RCRD: CPT | Mod: CPTII,S$GLB,, | Performed by: NURSE PRACTITIONER

## 2025-04-10 PROCEDURE — 1125F AMNT PAIN NOTED PAIN PRSNT: CPT | Mod: CPTII,S$GLB,, | Performed by: NURSE PRACTITIONER

## 2025-04-10 PROCEDURE — 3074F SYST BP LT 130 MM HG: CPT | Mod: CPTII,S$GLB,, | Performed by: NURSE PRACTITIONER

## 2025-04-10 PROCEDURE — 3079F DIAST BP 80-89 MM HG: CPT | Mod: CPTII,S$GLB,, | Performed by: NURSE PRACTITIONER

## 2025-04-10 PROCEDURE — 1159F MED LIST DOCD IN RCRD: CPT | Mod: CPTII,S$GLB,, | Performed by: NURSE PRACTITIONER

## 2025-04-10 PROCEDURE — 99999 PR PBB SHADOW E&M-EST. PATIENT-LVL IV: CPT | Mod: PBBFAC,,, | Performed by: NURSE PRACTITIONER
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
## 2025-04-11 DIAGNOSIS — R52 PAIN: Primary | ICD-10-CM

## 2025-05-01 ENCOUNTER — TELEPHONE (OUTPATIENT)
Dept: CARDIOLOGY | Facility: CLINIC | Age: 65
End: 2025-05-01
Payer: MEDICARE

## 2025-05-01 ENCOUNTER — HOSPITAL ENCOUNTER (OUTPATIENT)
Dept: CARDIOLOGY | Facility: HOSPITAL | Age: 65
Discharge: HOME | End: 2025-05-01
Attending: INTERNAL MEDICINE
Payer: MEDICARE

## 2025-05-01 VITALS — WEIGHT: 142 LBS | HEIGHT: 62 IN | BODY MASS INDEX: 26.13 KG/M2

## 2025-05-01 DIAGNOSIS — R07.9 CHEST PAIN: ICD-10-CM

## 2025-05-01 LAB
APICAL FOUR CHAMBER EJECTION FRACTION: 68 %
APICAL TWO CHAMBER EJECTION FRACTION: 57 %
AV INDEX (PROSTH): 0.84
AV MEAN GRADIENT: 3 MMHG
AV PEAK GRADIENT: 6 MMHG
AV VALVE AREA BY VELOCITY RATIO: 2.6 CM²
AV VALVE AREA: 2.6 CM²
AV VELOCITY RATIO: 0.83
BSA FOR ECHO PROCEDURE: 1.68 M2
CV ECHO LV RWT: 0.34 CM
DOP CALC AO PEAK VEL: 1.2 M/S
DOP CALC AO VTI: 22.2 CM
DOP CALC LVOT AREA: 3.1 CM2
DOP CALC LVOT DIAMETER: 2 CM
DOP CALC LVOT PEAK VEL: 1 M/S
DOP CALC LVOT STROKE VOLUME: 58.7 CM3
DOP CALC MV VTI: 22.3 CM
DOP CALCLVOT PEAK VEL VTI: 18.7 CM
E WAVE DECELERATION TIME: 254 MSEC
E/A RATIO: 0.78
E/E' RATIO: 10 M/S
ECHO LV POSTERIOR WALL: 0.6 CM (ref 0.6–1.1)
FRACTIONAL SHORTENING: 28.6 % (ref 28–44)
INTERVENTRICULAR SEPTUM: 1 CM (ref 0.6–1.1)
IVC DIAMETER: 1.04 CM
LEFT ATRIUM AREA SYSTOLIC (APICAL 2 CHAMBER): 10.69 CM2
LEFT ATRIUM AREA SYSTOLIC (APICAL 4 CHAMBER): 7.9 CM2
LEFT ATRIUM VOLUME INDEX MOD: 10 ML/M2
LEFT ATRIUM VOLUME MOD: 17 ML
LEFT INTERNAL DIMENSION IN SYSTOLE: 2.5 CM (ref 2.1–4)
LEFT VENTRICLE DIASTOLIC VOLUME INDEX: 31.52 ML/M2
LEFT VENTRICLE DIASTOLIC VOLUME: 52 ML
LEFT VENTRICLE END DIASTOLIC VOLUME APICAL 2 CHAMBER: 28.82 ML
LEFT VENTRICLE END DIASTOLIC VOLUME APICAL 4 CHAMBER: 34.43 ML
LEFT VENTRICLE END SYSTOLIC VOLUME APICAL 2 CHAMBER: 23.14 ML
LEFT VENTRICLE END SYSTOLIC VOLUME APICAL 4 CHAMBER: 12.55 ML
LEFT VENTRICLE MASS INDEX: 45.6 G/M2
LEFT VENTRICLE SYSTOLIC VOLUME INDEX: 13.3 ML/M2
LEFT VENTRICLE SYSTOLIC VOLUME: 22 ML
LEFT VENTRICULAR INTERNAL DIMENSION IN DIASTOLE: 3.5 CM (ref 3.5–6)
LEFT VENTRICULAR MASS: 75.3 G
LV LATERAL E/E' RATIO: 9.9 M/S
LV SEPTAL E/E' RATIO: 9.9 M/S
LVED V (TEICH): 51.71 ML
LVES V (TEICH): 21.7 ML
LVOT MG: 1.49 MMHG
LVOT MV: 0.53 CM/S
MV MEAN GRADIENT: 1 MMHG
MV PEAK A VEL: 0.89 M/S
MV PEAK E VEL: 0.69 M/S
MV PEAK GRADIENT: 4 MMHG
MV STENOSIS PRESSURE HALF TIME: 73.77 MS
MV VALVE AREA BY CONTINUITY EQUATION: 2.63 CM2
MV VALVE AREA P 1/2 METHOD: 2.98 CM2
OHS CV RV/LV RATIO: 0.83 CM
OHS LV EJECTION FRACTION SIMPSONS BIPLANE MOD: 62 %
PISA TR MAX VEL: 2.1 M/S
PV PEAK GRADIENT: 4 MMHG
PV PEAK VELOCITY: 0.99 M/S
RA PRESSURE ESTIMATED: 3 MMHG
RA VOL SYS: 12.22 ML
RIGHT ATRIAL AREA: 6.9 CM2
RIGHT ATRIUM VOLUME AREA LENGTH APICAL 4 CHAMBER: 11.46 ML
RIGHT VENTRICLE DIASTOLIC BASEL DIMENSION: 2.9 CM
RV TB RVSP: 5 MMHG
RV TISSUE DOPPLER FREE WALL SYSTOLIC VELOCITY 1 (APICAL 4 CHAMBER VIEW): 13.32 CM/S
SINUS: 2.65 CM
STJ: 2.3 CM
TDI LATERAL: 0.07 M/S
TDI SEPTAL: 0.07 M/S
TDI: 0.07 M/S
TR MAX PG: 18 MMHG
TRICUSPID ANNULAR PLANE SYSTOLIC EXCURSION: 1.7 CM
TV REST PULMONARY ARTERY PRESSURE: 21 MMHG
Z-SCORE OF LEFT VENTRICULAR DIMENSION IN END DIASTOLE: -2.8
Z-SCORE OF LEFT VENTRICULAR DIMENSION IN END SYSTOLE: -1.09

## 2025-05-01 PROCEDURE — 93306 TTE W/DOPPLER COMPLETE: CPT

## 2025-05-01 PROCEDURE — 93306 TTE W/DOPPLER COMPLETE: CPT | Mod: 26,,, | Performed by: INTERNAL MEDICINE

## 2025-05-01 NOTE — TELEPHONE ENCOUNTER
Called left message for patient to  call me back with more details. Thanks      ----- Message from Isabel sent at 5/1/2025 12:51 PM CDT -----  Type:  RX Refill RequestWho Called:  PtRefill or New Rx: RefillRX Name and Strength: Pt does not remember name of medicationHow is the patient currently taking it? (ex. 1XDay):Is this a 30 day or 90 day RX:Preferred Pharmacy with phone number: [facility]/pharmacy #[id number] - [place], [place] - [address] AT [facility] or Mail Order: LocalOrdering Provider: Dr. Davisould the patient rather a call back or a response via [facility]? CallBest Call Back Number: [phone] Information: Pt states she was given a prescription to be filled but misplaced the prescription and need another request sent in to pharmacy.

## 2025-05-02 ENCOUNTER — TELEPHONE (OUTPATIENT)
Dept: CARDIOLOGY | Facility: CLINIC | Age: 65
End: 2025-05-02
Payer: MEDICARE

## 2025-05-02 NOTE — TELEPHONE ENCOUNTER
"Received transfer from call center for c/o chest pain    Pt denies chest pain She is complaining about funny felling in the head and would like to reschedule an angiogram that was cancelled in April 2024.    Chart reviewed and notified her that her first encounter with Dr Soria was July 2024   She denies discussing this with him during her visit.     Notified her that there was a cerebral angiogram for Dr Gaspar at [facility] from 2023 She responded "yes, I called them He is no longer there"    Notified her that Dr Hogan is her Cardiologist and she would need a neurologist to see her regarding a cerebral angiogram    Advised her to reach out to her PCP to refer to a neurologist     She continued on about her visit with Pain management and how she was there for pain medication and did not receive any medication.     Advised her that she has the right to see another provider and she declined. Stated that she will call the [facility] for scheduling.      "

## 2025-07-30 ENCOUNTER — HOSPITAL ENCOUNTER (EMERGENCY)
Facility: HOSPITAL | Age: 65
Discharge: HOME | End: 2025-07-31
Attending: EMERGENCY MEDICINE
Payer: MEDICARE

## 2025-07-30 DIAGNOSIS — R07.9 CHEST PAIN: ICD-10-CM

## 2025-07-30 DIAGNOSIS — R07.9 CHEST PAIN, UNSPECIFIED TYPE: Primary | ICD-10-CM

## 2025-07-30 LAB
ABSOLUTE EOSINOPHIL (OHS): 0.11 K/UL
ABSOLUTE MONOCYTE (OHS): 0.64 K/UL (ref 0.3–1)
ABSOLUTE NEUTROPHIL COUNT (OHS): 4.91 K/UL (ref 1.8–7.7)
ALBUMIN SERPL BCP-MCNC: 4 G/DL (ref 3.5–5.2)
ALP SERPL-CCNC: 95 UNIT/L (ref 40–150)
ALT SERPL W/O P-5'-P-CCNC: 14 UNIT/L (ref 10–44)
ANION GAP (OHS): 10 MMOL/L (ref 8–16)
AST SERPL-CCNC: 15 UNIT/L (ref 11–45)
BASOPHILS # BLD AUTO: 0.05 K/UL
BASOPHILS NFR BLD AUTO: 0.7 %
BILIRUB SERPL-MCNC: 0.5 MG/DL (ref 0.1–1)
BUN SERPL-MCNC: 14 MG/DL (ref 8–23)
CALCIUM SERPL-MCNC: 8.7 MG/DL (ref 8.7–10.5)
CHLORIDE SERPL-SCNC: 109 MMOL/L (ref 95–110)
CO2 SERPL-SCNC: 23 MMOL/L (ref 23–29)
CREAT SERPL-MCNC: 1 MG/DL (ref 0.5–1.4)
ERYTHROCYTE [DISTWIDTH] IN BLOOD BY AUTOMATED COUNT: 13.2 % (ref 11.5–14.5)
GFR SERPLBLD CREATININE-BSD FMLA CKD-EPI: >60 ML/MIN/1.73/M2
GLUCOSE SERPL-MCNC: 111 MG/DL (ref 70–110)
HCT VFR BLD AUTO: 39.9 % (ref 37–48.5)
HGB BLD-MCNC: 13.5 GM/DL (ref 12–16)
IMM GRANULOCYTES # BLD AUTO: 0.02 K/UL (ref 0–0.04)
IMM GRANULOCYTES NFR BLD AUTO: 0.3 % (ref 0–0.5)
LYMPHOCYTES # BLD AUTO: 1.9 K/UL (ref 1–4.8)
MCH RBC QN AUTO: 29.2 PG (ref 27–31)
MCHC RBC AUTO-ENTMCNC: 33.8 G/DL (ref 32–36)
MCV RBC AUTO: 86 FL (ref 82–98)
NUCLEATED RBC (/100WBC) (OHS): 0 /100 WBC
PLATELET # BLD AUTO: 251 K/UL (ref 150–450)
PMV BLD AUTO: 9 FL (ref 9.2–12.9)
POTASSIUM SERPL-SCNC: 3.7 MMOL/L (ref 3.5–5.1)
PROT SERPL-MCNC: 7.1 GM/DL (ref 6–8.4)
RBC # BLD AUTO: 4.63 M/UL (ref 4–5.4)
RELATIVE EOSINOPHIL (OHS): 1.4 %
RELATIVE LYMPHOCYTE (OHS): 24.9 % (ref 18–48)
RELATIVE MONOCYTE (OHS): 8.4 % (ref 4–15)
RELATIVE NEUTROPHIL (OHS): 64.3 % (ref 38–73)
SODIUM SERPL-SCNC: 142 MMOL/L (ref 136–145)
WBC # BLD AUTO: 7.63 K/UL (ref 3.9–12.7)

## 2025-07-30 PROCEDURE — 25000003 PHARM REV CODE 250: Performed by: EMERGENCY MEDICINE

## 2025-07-30 PROCEDURE — 85025 COMPLETE CBC W/AUTO DIFF WBC: CPT | Performed by: EMERGENCY MEDICINE

## 2025-07-30 PROCEDURE — 80053 COMPREHEN METABOLIC PANEL: CPT | Performed by: EMERGENCY MEDICINE

## 2025-07-30 PROCEDURE — 93010 ELECTROCARDIOGRAM REPORT: CPT | Mod: ,,, | Performed by: STUDENT IN AN ORGANIZED HEALTH CARE EDUCATION/TRAINING PROGRAM

## 2025-07-30 PROCEDURE — 99285 EMERGENCY DEPT VISIT HI MDM: CPT | Mod: 25

## 2025-07-30 PROCEDURE — 25500020 PHARM REV CODE 255: Performed by: EMERGENCY MEDICINE

## 2025-07-30 PROCEDURE — 93005 ELECTROCARDIOGRAM TRACING: CPT

## 2025-07-30 RX ORDER — METHOCARBAMOL 500 MG/1
1000 TABLET, FILM COATED ORAL 3 TIMES DAILY
Qty: 30 TABLET | Refills: 0 | Status: SHIPPED | OUTPATIENT
Start: 2025-07-30 | End: 2025-07-30

## 2025-07-30 RX ORDER — ACETAMINOPHEN 500 MG
1000 TABLET ORAL
Status: COMPLETED | OUTPATIENT
Start: 2025-07-30 | End: 2025-07-30

## 2025-07-30 RX ORDER — METHOCARBAMOL 500 MG/1
1000 TABLET, FILM COATED ORAL 3 TIMES DAILY
Qty: 30 TABLET | Refills: 0 | Status: SHIPPED | OUTPATIENT
Start: 2025-07-30 | End: 2025-08-04

## 2025-07-30 RX ADMIN — IOHEXOL 100 ML: 350 INJECTION, SOLUTION INTRAVENOUS at 11:07

## 2025-07-30 RX ADMIN — ACETAMINOPHEN 1000 MG: 500 TABLET ORAL at 10:07

## 2025-07-31 VITALS
RESPIRATION RATE: 20 BRPM | SYSTOLIC BLOOD PRESSURE: 120 MMHG | HEIGHT: 62 IN | WEIGHT: 140 LBS | HEART RATE: 86 BPM | BODY MASS INDEX: 25.76 KG/M2 | OXYGEN SATURATION: 96 % | TEMPERATURE: 99 F | DIASTOLIC BLOOD PRESSURE: 85 MMHG

## 2025-07-31 LAB
OHS QRS DURATION: 78 MS
OHS QTC CALCULATION: 447 MS
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
## 2025-07-31 NOTE — ED PROVIDER NOTES
Encounter Date: 7/30/2025       History     Chief Complaint   Patient presents with    Chest Pain     Patient states she has a hx of collapsed lung and multiple broken ribs. She reports left side lung pain when she takes a deep breath and dyspnea with exertion for the last 2 weeks. No distress in triage. Denies recent fever, cough, congestion.     64-year-old female with a history of COPD, GERD, hypertension, collapsed lung with fractured ribs status post repair in 2023 presents with 2 weeks of left-sided chest wall pain and difficulty breathing.  Patient says she has tried to get in touch with her surgeon however she was told to come to the ED. No coughing of blood.  No fever.  Patient says she also has pain in her hip.  No abdominal pain.      Review of patient's allergies indicates:  No Known Allergies  Past Medical History:   Diagnosis Date    Anxiety     Arthritis     COPD (chronic obstructive pulmonary disease)     Depression     GERD (gastroesophageal reflux disease)     Hypertension      Past Surgical History:   Procedure Laterality Date    COLONOSCOPY N/A 12/6/2018    Procedure: COLONOSCOPY;  Surgeon: Endy Moncada MD;  Location: [facility] ([address] [place] [address]);  Service: Endoscopy;  Laterality: N/A;  PM Prep - sm    ESOPHAGOGASTRODUODENOSCOPY N/A 3/10/2020    Procedure: EGD (ESOPHAGOGASTRODUODENOSCOPY);  Surgeon: Derrell Kovacs MD;  Location: [address]);  Service: Endoscopy;  Laterality: N/A;     Family History   Problem Relation Name Age of Onset    Cancer Mother      Cancer Brother      Diabetes Maternal Uncle      Colon cancer Neg Hx      Stomach cancer Neg Hx      Inflammatory bowel disease Neg Hx       Social History[1]  Review of Systems   Constitutional:  Negative for fever.   Eyes:  Negative for pain.   Respiratory:  Positive for shortness of breath.    Cardiovascular:  Positive for chest pain.   Gastrointestinal:  Negative for abdominal pain, nausea and vomiting.   Genitourinary:  Negative  for difficulty urinating.   Musculoskeletal:  Positive for arthralgias. Negative for back pain and neck pain.   Neurological:  Negative for headaches.   Psychiatric/Behavioral:  Negative for confusion.        Physical Exam     Initial Vitals [07/30/25 1918]   BP Pulse Resp Temp SpO2   (!) 149/85 102 18 98.6 °F (37 °C) 97 %      MAP       --         Physical Exam    Nursing note and vitals reviewed.  HENT:   Head: Atraumatic.   Eyes: Conjunctivae and EOM are normal.   Neck:   Normal range of motion.  Cardiovascular:      Exam reveals no gallop and no friction rub.       No murmur heard.  Tachycardic   Pulmonary/Chest: Breath sounds normal. No respiratory distress. She has no wheezes. She has no rales.   Breast exam performed with RN chaperone.  No palpable masses or overlying skin changes.  +tenderness to palpation to left lateral chest near healed incision   Abdominal: Abdomen is soft. There is no abdominal tenderness.   Musculoskeletal:      Cervical back: Normal range of motion.     Neurological: She is alert and oriented to person, place, and time.   Psychiatric: She has a normal mood and affect.         ED Course   Procedures  Labs Reviewed   COMPREHENSIVE METABOLIC PANEL - Abnormal       Result Value    Sodium 142      Potassium 3.7      Chloride 109      CO2 23      Glucose 111 (*)     BUN 14      Creatinine 1.0      Calcium 8.7      Protein Total 7.1      Albumin 4.0      Bilirubin Total 0.5      ALP 95      AST 15      ALT 14      Anion Gap 10      eGFR >60     CBC WITH DIFFERENTIAL - Abnormal    WBC 7.63      RBC 4.63      HGB 13.5      HCT 39.9      MCV 86      MCH 29.2      MCHC 33.8      RDW 13.2      Platelet Count 251      MPV 9.0 (*)     Nucleated RBC 0      Neut % 64.3      Lymph % 24.9      Mono % 8.4      Eos % 1.4      Basophil % 0.7      Imm Grans % 0.3      Neut # 4.91      Lymph # 1.90      Mono # 0.64      Eos # 0.11      Baso # 0.05      Imm Grans # 0.02     CBC W/ AUTO DIFFERENTIAL     Narrative:     The following orders were created for panel order CBC Auto Differential.  Procedure                               Abnormality         Status                     ---------                               -----------         ------                     CBC with Differential[[id number]]       Abnormal            Final result                 Please view results for these tests on the individual orders.          Imaging Results              CTA Chest Non-Coronary (PE Studies) (Final result)  Result time 07/30/25 23:36:06      Final result by Walter Montoya MD (07/30/25 23:36:06)                   Impression:      1. No evidence of acute pulmonary thromboembolism.  2. No acute findings elsewhere in the chest.      Electronically signed by: Walter Montoya  Date:    07/30/2025  Time:    23:36               Narrative:    EXAMINATION:  CTA CHEST NON CORONARY (PE STUDIES)    CLINICAL HISTORY:  Pulmonary embolism (PE) suspected, high prob;    TECHNIQUE:  Following the intravenous administration of 75 cc of Omnipaque 350 contrast material, 1.25 mm contiguous axial images were acquired through the chest utilizing the CT pulmonary angiogram protocol.  2-D coronal and sagittal reconstructed images of the chest and sagittal oblique MIP images of the pulmonary arterial system were generated from the source data.    COMPARISON:  None.    FINDINGS:  Pulmonary arterial system: This is a good quality examination for the evaluation of pulmonary thromboembolism with good opacification of the pulmonary arteries to the level of the segmental branches of the pulmonary arterial system. There is no evidence of acute pulmonary thromboembolism. No large saddle pulmonary embolism, enlargement of the main pulmonary artery, or secondary findings of right ventricular strain.    Aorta and heart: The heart is normal in size.  No pericardial fluid.  No thoracic aortic aneurysm.  No thoracic aortic dissection.    Airways:  Unremarkable.    Lymph nodes: No pathologically enlarged lymph nodes in the chest or axilla.    Lungs: The lungs are clear with no evidence of airspace consolidation, mass, or bronchiectasis.  Scar-like density along the major fissure on the left may be related to remote rib trauma.  No emphysematous lung architecture.    Pleura: No significant volume of pleural fluid or pneumothorax.  No pleural based masses.    Visualized upper abdominal soft tissues: -9 Hounsfield unit 2.6 cm left adrenal adenoma..    Bones: There is degenerative change of the thoracic spine.  Orthopedic hardware in several left ribs.  No acute fracture.  Deformity of the body of the right scapula suggest remote trauma or fibrous dysplasia.                                       X-Ray Hip 2 or 3 views Left with Pelvis when performed (Final result)  Result time 07/30/25 20:57:15      Final result by David Sloan MD (07/30/25 20:57:15)                   Impression:      1. No acute displaced fracture or dislocation of the left hip.      Electronically signed by: David Sloan MD  Date:    07/30/2025  Time:    20:57               Narrative:    EXAMINATION:  XR HIP WITH PELVIS WHEN PERFORMED 2 OR 3 VIEWS LEFT    CLINICAL HISTORY:  hip pain;    TECHNIQUE:  AP view of the pelvis and frog leg lateral view of the left hip were performed.    COMPARISON:  12/08/2021    FINDINGS:  Three views left hip.    The left femoral head maintains appropriate relationship with the acetabulum noting femoroacetabular degenerative change.  There are degenerative changes of the left sacroiliac joint and about the left greater trochanter.  The right hip appears intact.                                       Medications   acetaminophen tablet 1,000 mg (1,000 mg Oral Given 7/30/25 2237)   iohexoL (OMNIPAQUE 350) injection 100 mL (100 mLs Intravenous Given 7/30/25 2304)     Medical Decision Making  64-year-old female with a history of pneumothorax and fracture ribs  status post repair in  presenting with pleuritic chest pain for the past 2 weeks.    Amount and/or Complexity of Data Reviewed  Labs: ordered. Decision-making details documented in ED Course.  Radiology: ordered.    Risk  OTC drugs.  Prescription drug management.               ED Course as of 25 CBC Auto Differential(!) [SN]    Comprehensive Metabolic Panel(!) [SN]    Hip films unremarkable. [SN]    CTA without acute findings.  Low suspicion for acute life-threatening illness.  Doubt acute MI. doubt dissection.  Plan for symptomatic treatment and primary care follow-up. [SN]      ED Course User Index  [SN] Christiano Ponce MD                               Clinical Impression:  Final diagnoses:  [R07.9] Chest pain  [R07.9] Chest pain, unspecified type (Primary)          ED Disposition Condition    Discharge Stable          ED Prescriptions       Medication Sig Dispense Start Date End Date Auth. Provider    methocarbamoL (ROBAXIN) 500 MG Tab Take 2 tablets (1,000 mg total) by mouth 3 (three) times daily. for 5 days 30 tablet 2025 Christiano Ponce MD          Follow-up Information       Follow up With Specialties Details Why Contact Info    Cristine Austin FNP Family Medicine Schedule an appointment as soon as possible for a visit in 3 days  [address] [facility] [address]  [phone]                     [1]   Social History  Tobacco Use    Smoking status: Former     Current packs/day: 0.00     Types: Cigarettes     Quit date: 1995     Years since quittin.6    Smokeless tobacco: Never   Vaping Use    Vaping status: Former   Substance Use Topics    Alcohol use: Yes     Comment: weekends    Drug use: No        Christiano Ponce MD  25